# Patient Record
Sex: FEMALE | Race: WHITE | NOT HISPANIC OR LATINO | Employment: FULL TIME | RURAL
[De-identification: names, ages, dates, MRNs, and addresses within clinical notes are randomized per-mention and may not be internally consistent; named-entity substitution may affect disease eponyms.]

---

## 2020-03-18 ENCOUNTER — HISTORICAL (OUTPATIENT)
Dept: ADMINISTRATIVE | Facility: HOSPITAL | Age: 60
End: 2020-03-18

## 2020-12-03 ENCOUNTER — HISTORICAL (OUTPATIENT)
Dept: ADMINISTRATIVE | Facility: HOSPITAL | Age: 60
End: 2020-12-03

## 2020-12-15 ENCOUNTER — HISTORICAL (OUTPATIENT)
Dept: ADMINISTRATIVE | Facility: HOSPITAL | Age: 60
End: 2020-12-15

## 2021-04-14 DIAGNOSIS — J44.9 COLD (CHRONIC OBSTRUCTIVE LUNG DISEASE): Primary | ICD-10-CM

## 2021-12-29 ENCOUNTER — OFFICE VISIT (OUTPATIENT)
Dept: FAMILY MEDICINE | Facility: CLINIC | Age: 61
End: 2021-12-29
Payer: COMMERCIAL

## 2021-12-29 VITALS — TEMPERATURE: 99 F

## 2021-12-29 DIAGNOSIS — Z20.822 CONTACT WITH AND (SUSPECTED) EXPOSURE TO COVID-19: Primary | ICD-10-CM

## 2021-12-29 DIAGNOSIS — R11.0 NAUSEA: ICD-10-CM

## 2021-12-29 LAB
CTP QC/QA: YES
FLUAV AG NPH QL: NEGATIVE
FLUBV AG NPH QL: NEGATIVE
SARS-COV-2 AG RESP QL IA.RAPID: NEGATIVE

## 2021-12-29 PROCEDURE — 87428 POCT SARS-COV2 (COVID) WITH FLU ANTIGEN: ICD-10-PCS | Mod: QW,,, | Performed by: NURSE PRACTITIONER

## 2021-12-29 PROCEDURE — 99051 MED SERV EVE/WKEND/HOLIDAY: CPT | Mod: ,,, | Performed by: NURSE PRACTITIONER

## 2021-12-29 PROCEDURE — 87428 SARSCOV & INF VIR A&B AG IA: CPT | Mod: QW,,, | Performed by: NURSE PRACTITIONER

## 2021-12-29 PROCEDURE — 1159F MED LIST DOCD IN RCRD: CPT | Mod: CPTII,,, | Performed by: NURSE PRACTITIONER

## 2021-12-29 PROCEDURE — 99051 PR MEDICAL SERVICES, EVE/WKEND/HOLIDAY: ICD-10-PCS | Mod: ,,, | Performed by: NURSE PRACTITIONER

## 2021-12-29 PROCEDURE — 1160F RVW MEDS BY RX/DR IN RCRD: CPT | Mod: CPTII,,, | Performed by: NURSE PRACTITIONER

## 2021-12-29 PROCEDURE — 99213 PR OFFICE/OUTPT VISIT, EST, LEVL III, 20-29 MIN: ICD-10-PCS | Mod: ,,, | Performed by: NURSE PRACTITIONER

## 2021-12-29 PROCEDURE — 1160F PR REVIEW ALL MEDS BY PRESCRIBER/CLIN PHARMACIST DOCUMENTED: ICD-10-PCS | Mod: CPTII,,, | Performed by: NURSE PRACTITIONER

## 2021-12-29 PROCEDURE — 1159F PR MEDICATION LIST DOCUMENTED IN MEDICAL RECORD: ICD-10-PCS | Mod: CPTII,,, | Performed by: NURSE PRACTITIONER

## 2021-12-29 PROCEDURE — 99213 OFFICE O/P EST LOW 20 MIN: CPT | Mod: ,,, | Performed by: NURSE PRACTITIONER

## 2021-12-29 RX ORDER — ONDANSETRON 4 MG/1
4 TABLET, FILM COATED ORAL EVERY 6 HOURS PRN
Qty: 30 TABLET | Refills: 0 | Status: SHIPPED | OUTPATIENT
Start: 2021-12-29 | End: 2022-03-29 | Stop reason: ALTCHOICE

## 2021-12-31 ENCOUNTER — OFFICE VISIT (OUTPATIENT)
Dept: FAMILY MEDICINE | Facility: CLINIC | Age: 61
End: 2021-12-31
Payer: COMMERCIAL

## 2021-12-31 VITALS
RESPIRATION RATE: 18 BRPM | HEIGHT: 65 IN | WEIGHT: 90 LBS | BODY MASS INDEX: 14.99 KG/M2 | HEART RATE: 82 BPM | OXYGEN SATURATION: 98 % | TEMPERATURE: 98 F

## 2021-12-31 DIAGNOSIS — J20.9 ACUTE BRONCHITIS, UNSPECIFIED ORGANISM: Primary | ICD-10-CM

## 2021-12-31 DIAGNOSIS — R09.81 NASAL CONGESTION: ICD-10-CM

## 2021-12-31 DIAGNOSIS — Z20.822 LAB TEST NEGATIVE FOR COVID-19 VIRUS: ICD-10-CM

## 2021-12-31 PROCEDURE — 1159F PR MEDICATION LIST DOCUMENTED IN MEDICAL RECORD: ICD-10-PCS | Mod: CPTII,,, | Performed by: NURSE PRACTITIONER

## 2021-12-31 PROCEDURE — 1160F RVW MEDS BY RX/DR IN RCRD: CPT | Mod: CPTII,,, | Performed by: NURSE PRACTITIONER

## 2021-12-31 PROCEDURE — 3008F BODY MASS INDEX DOCD: CPT | Mod: CPTII,,, | Performed by: NURSE PRACTITIONER

## 2021-12-31 PROCEDURE — 1159F MED LIST DOCD IN RCRD: CPT | Mod: CPTII,,, | Performed by: NURSE PRACTITIONER

## 2021-12-31 PROCEDURE — 99214 OFFICE O/P EST MOD 30 MIN: CPT | Mod: ,,, | Performed by: NURSE PRACTITIONER

## 2021-12-31 PROCEDURE — 87428 POCT SARS-COV2 (COVID) WITH FLU ANTIGEN: ICD-10-PCS | Mod: QW,,, | Performed by: NURSE PRACTITIONER

## 2021-12-31 PROCEDURE — 3008F PR BODY MASS INDEX (BMI) DOCUMENTED: ICD-10-PCS | Mod: CPTII,,, | Performed by: NURSE PRACTITIONER

## 2021-12-31 PROCEDURE — 1160F PR REVIEW ALL MEDS BY PRESCRIBER/CLIN PHARMACIST DOCUMENTED: ICD-10-PCS | Mod: CPTII,,, | Performed by: NURSE PRACTITIONER

## 2021-12-31 PROCEDURE — 87428 SARSCOV & INF VIR A&B AG IA: CPT | Mod: QW,,, | Performed by: NURSE PRACTITIONER

## 2021-12-31 PROCEDURE — 99214 PR OFFICE/OUTPT VISIT, EST, LEVL IV, 30-39 MIN: ICD-10-PCS | Mod: ,,, | Performed by: NURSE PRACTITIONER

## 2021-12-31 RX ORDER — ALBUTEROL SULFATE 90 UG/1
2 AEROSOL, METERED RESPIRATORY (INHALATION) EVERY 6 HOURS PRN
Qty: 18 G | Refills: 1 | Status: SHIPPED | OUTPATIENT
Start: 2021-12-31 | End: 2023-07-05 | Stop reason: SDUPTHER

## 2021-12-31 NOTE — PROGRESS NOTES
[facility]    Chief Complaint      Chief Complaint   Patient presents with    Fatigue     Exposed to covid/fatigue/runny nose/nausea/ Headaches/coughing     History of Present Illness      Neyda Claire is a 61 y.o. female with chronic conditions of who presents today for c/o URI symptoms x1 week.    URI   This is a recurrent problem. The current episode started in the past 7 days. The problem has been waxing and waning. There has been no fever. Associated symptoms include congestion, coughing, nausea, neck pain, a sore throat and wheezing. Pertinent negatives include no abdominal pain, chest pain, diarrhea, dysuria, ear pain, headaches, plugged ear sensation, rash, rhinorrhea, sinus pain, sneezing, swollen glands or vomiting. She has tried inhaler use for the symptoms. The treatment provided mild relief.     Past Medical History:  History reviewed. No pertinent past medical history.    Past Surgical History:   has no past surgical history on file.    Social History:  Social History     Tobacco Use    Smoking status: Current Every Day Smoker     Types: Vaping with nicotine    Smokeless tobacco: Never Used   Substance Use Topics    Drug use: Never     I personally reviewed all past medical, surgical, and social.     Review of Systems   Constitutional: Positive for malaise/fatigue. Negative for chills and fever.   HENT: Positive for congestion and sore throat. Negative for ear pain, hearing loss, rhinorrhea, sinus pain, sneezing and tinnitus.    Eyes: Negative for discharge and redness.   Respiratory: Positive for cough and wheezing. Negative for sputum production and shortness of breath.    Cardiovascular: Negative for chest pain, palpitations and leg swelling.   Gastrointestinal: Positive for nausea. Negative for abdominal pain, diarrhea and vomiting.   Genitourinary: Negative for dysuria, frequency and urgency.   Musculoskeletal: Positive for neck pain. Negative for myalgias.   Skin: Negative for  "itching and rash.   Neurological: Negative for dizziness, weakness and headaches.   Endo/Heme/Allergies: Does not bruise/bleed easily.   Psychiatric/Behavioral: Negative for suicidal ideas.        Medications:  Outpatient Encounter Medications as of 12/31/2021   Medication Sig Dispense Refill    albuterol (VENTOLIN HFA) 90 mcg/actuation inhaler Inhale 2 puffs into the lungs every 6 (six) hours as needed for Wheezing. Rescue 18 g 1    ondansetron (ZOFRAN) 4 MG tablet Take 1 tablet (4 mg total) by mouth every 6 (six) hours as needed for Nausea. 30 tablet 0     No facility-administered encounter medications on file as of 12/31/2021.     Allergies:  Review of patient's allergies indicates:   Allergen Reactions    Sulfa (sulfonamide antibiotics)      Health Maintenance:  Immunization History   Administered Date(s) Administered    COVID-19, MRNA, LN-S, PF (MODERNA FULL 0.5 ML DOSE) 03/12/2021, 04/09/2021      Health Maintenance   Topic Date Due    Hepatitis C Screening  Never done    Lipid Panel  Never done    TETANUS VACCINE  Never done    Mammogram  Never done        Physical Exam      Height and Weight  Height: 5' 5" (165.1 cm)  Weight: 40.8 kg (90 lb)  BSA (Calculated - sq m): 1.37 sq meters  BMI (Calculated): 15  Weight in (lb) to have BMI = 25: 149.9]    Physical Exam  Vitals and nursing note reviewed.   Constitutional:       General: She is not in acute distress.     Appearance: Normal appearance.   HENT:      Head: Normocephalic and atraumatic.      Right Ear: External ear normal.      Left Ear: External ear normal.      Nose: Nose normal.      Mouth/Throat:      Mouth: Mucous membranes are moist.      Pharynx: Oropharynx is clear.   Eyes:      Conjunctiva/sclera: Conjunctivae normal.      Pupils: Pupils are equal, round, and reactive to light.   Cardiovascular:      Rate and Rhythm: Normal rate and regular rhythm.      Pulses: Normal pulses.      Heart sounds: Normal heart sounds. No murmur " heard.      Pulmonary:      Effort: Pulmonary effort is normal. No respiratory distress.      Breath sounds: Examination of the left-upper field reveals wheezing. Examination of the left-middle field reveals wheezing. Wheezing present.   Musculoskeletal:         General: Normal range of motion.      Cervical back: Normal range of motion and neck supple.   Skin:     General: Skin is warm and dry.   Neurological:      General: No focal deficit present.      Mental Status: She is alert and oriented to person, place, and time. Mental status is at baseline.   Psychiatric:         Mood and Affect: Mood normal.         Behavior: Behavior normal.         Thought Content: Thought content normal.         Judgment: Judgment normal.        Assessment/Plan     Neyda Claire is a 61 y.o.female with:    1. Nasal congestion  - POCT SARS-COV2 (COVID) with Flu Antigen    2. Lab test negative for COVID-19 virus    3. Acute bronchitis, unspecified organism  - albuterol (VENTOLIN HFA) 90 mcg/actuation inhaler; Inhale 2 puffs into the lungs every 6 (six) hours as needed for Wheezing. Rescue  Dispense: 18 g; Refill: 1   Your test was NEGATIVE for COVID-19 (coronavirus).      You may leave home and/or return to work when the following conditions are met:  · 24 hours fever free without fever-reducing medications AND  · Improved symptoms  · You are fully vaccinated or have not had close contact with someone with COVID-19 (within 6 feet for 15 minutes or more)    If you are fully vaccinated and had a close contact, there is no need for quarantine, unless you develop symptoms.      If you are not fully vaccinated and had a close contact:  · Retest at 5 to 7 days post-exposure  · If possible, it is recommended that you quarantine for 5 days from the time of contact regardless of your test status.  · A mask should be work indoors post quarantine.    If your symptoms worsen or if you have any other concerns, please contact [facility] at  [phone].     Sincerely,    Chronic conditions status updated as per HPI.  Other than changes above, cont current medications and maintain follow up with specialists.  Return to clinic as needed.    SHEILA Cadet  [facility]

## 2021-12-31 NOTE — LETTER
[address] [facility] [address]  Phone: [phone]  Fax: [phone]          Return to Work/School    Patient: Neyda Claire  YOB: 1960   Date: 12/31/2021     To Whom It May Concern:     Neyda Claire was in contact with/seen in my office on 12/31/2021. COVID-19 is present in our communities across the [facility]. There is limited testing for COVID at this time, so not all patients can be tested. In this situation, your employee meets the following criteria:     Neyda Claire has met the criteria for COVID-19 testing and has a NEGATIVE result. The employee can return to work once they are asymptomatic for 24 hours without the use of fever reducing medications (Tylenol, Motrin, etc).     If you have any questions or concerns, or if I can be of further assistance, please do not hesitate to contact me.     Sincerely,    SHEILA Cadet

## 2022-02-28 ENCOUNTER — OFFICE VISIT (OUTPATIENT)
Dept: FAMILY MEDICINE | Facility: CLINIC | Age: 62
End: 2022-02-28
Payer: COMMERCIAL

## 2022-02-28 VITALS
BODY MASS INDEX: 14.99 KG/M2 | WEIGHT: 90 LBS | DIASTOLIC BLOOD PRESSURE: 89 MMHG | TEMPERATURE: 99 F | HEIGHT: 65 IN | HEART RATE: 99 BPM | SYSTOLIC BLOOD PRESSURE: 105 MMHG

## 2022-02-28 DIAGNOSIS — Z20.822 CONTACT WITH AND (SUSPECTED) EXPOSURE TO COVID-19: ICD-10-CM

## 2022-02-28 DIAGNOSIS — M54.2 MUSCLE PAIN, CERVICAL: Primary | ICD-10-CM

## 2022-02-28 DIAGNOSIS — R05.9 COUGH: ICD-10-CM

## 2022-02-28 PROCEDURE — 3008F BODY MASS INDEX DOCD: CPT | Mod: ,,, | Performed by: NURSE PRACTITIONER

## 2022-02-28 PROCEDURE — 87428 SARSCOV & INF VIR A&B AG IA: CPT | Mod: QW,,, | Performed by: NURSE PRACTITIONER

## 2022-02-28 PROCEDURE — 1160F RVW MEDS BY RX/DR IN RCRD: CPT | Mod: ,,, | Performed by: NURSE PRACTITIONER

## 2022-02-28 PROCEDURE — 3074F SYST BP LT 130 MM HG: CPT | Mod: ,,, | Performed by: NURSE PRACTITIONER

## 2022-02-28 PROCEDURE — 87428 POCT SARS-COV2 (COVID) WITH FLU ANTIGEN: ICD-10-PCS | Mod: QW,,, | Performed by: NURSE PRACTITIONER

## 2022-02-28 PROCEDURE — 3079F PR MOST RECENT DIASTOLIC BLOOD PRESSURE 80-89 MM HG: ICD-10-PCS | Mod: ,,, | Performed by: NURSE PRACTITIONER

## 2022-02-28 PROCEDURE — 1159F PR MEDICATION LIST DOCUMENTED IN MEDICAL RECORD: ICD-10-PCS | Mod: ,,, | Performed by: NURSE PRACTITIONER

## 2022-02-28 PROCEDURE — 99214 PR OFFICE/OUTPT VISIT, EST, LEVL IV, 30-39 MIN: ICD-10-PCS | Mod: ,,, | Performed by: NURSE PRACTITIONER

## 2022-02-28 PROCEDURE — 3074F PR MOST RECENT SYSTOLIC BLOOD PRESSURE < 130 MM HG: ICD-10-PCS | Mod: ,,, | Performed by: NURSE PRACTITIONER

## 2022-02-28 PROCEDURE — 3079F DIAST BP 80-89 MM HG: CPT | Mod: ,,, | Performed by: NURSE PRACTITIONER

## 2022-02-28 PROCEDURE — 1159F MED LIST DOCD IN RCRD: CPT | Mod: ,,, | Performed by: NURSE PRACTITIONER

## 2022-02-28 PROCEDURE — 3008F PR BODY MASS INDEX (BMI) DOCUMENTED: ICD-10-PCS | Mod: ,,, | Performed by: NURSE PRACTITIONER

## 2022-02-28 PROCEDURE — 99214 OFFICE O/P EST MOD 30 MIN: CPT | Mod: ,,, | Performed by: NURSE PRACTITIONER

## 2022-02-28 PROCEDURE — 1160F PR REVIEW ALL MEDS BY PRESCRIBER/CLIN PHARMACIST DOCUMENTED: ICD-10-PCS | Mod: ,,, | Performed by: NURSE PRACTITIONER

## 2022-02-28 RX ORDER — PREDNISONE 20 MG/1
20 TABLET ORAL DAILY
Qty: 5 TABLET | Refills: 0 | Status: SHIPPED | OUTPATIENT
Start: 2022-02-28 | End: 2022-03-29 | Stop reason: ALTCHOICE

## 2022-02-28 RX ORDER — NAPROXEN SODIUM 550 MG/1
550 TABLET ORAL 2 TIMES DAILY WITH MEALS
Qty: 30 TABLET | Refills: 0 | Status: SHIPPED | OUTPATIENT
Start: 2022-02-28 | End: 2022-03-29 | Stop reason: ALTCHOICE

## 2022-02-28 RX ORDER — TIZANIDINE 4 MG/1
4 TABLET ORAL EVERY 6 HOURS PRN
Qty: 30 TABLET | Refills: 0 | Status: SHIPPED | OUTPATIENT
Start: 2022-02-28 | End: 2022-03-10

## 2022-02-28 RX ORDER — BENZONATATE 100 MG/1
100 CAPSULE ORAL 3 TIMES DAILY PRN
Qty: 30 CAPSULE | Refills: 0 | Status: SHIPPED | OUTPATIENT
Start: 2022-02-28 | End: 2022-03-29 | Stop reason: ALTCHOICE

## 2022-02-28 NOTE — PROGRESS NOTES
[facility]    Chief Complaint      Chief Complaint   Patient presents with    Cough    Nasal Congestion    Fever    Chills    Generalized Body Aches    Headache    Nausea    Documentation Only     X3d ago; main thing the cough; vac; no known exp; pain in neck (right) that radiates to ear and shoulder with limited movement; needs excuse       History of Present Illness      Neyda Claire is a 61 y.o. female with chronic conditions of COPD who presents today for c/o URI type symptoms primarily a cough and in her neck/R shoulder area.    Past Medical History:  History reviewed. No pertinent past medical history.    Past Surgical History:   has no past surgical history on file.    Social History:  Social History     Tobacco Use    Smoking status: Current Every Day Smoker     Types: Vaping with nicotine    Smokeless tobacco: Never Used   Substance Use Topics    Drug use: Never       I personally reviewed all past medical, surgical, and social.     Review of Systems   Constitutional: Positive for chills and fever. Negative for fatigue.   HENT: Positive for congestion. Negative for rhinorrhea, sneezing and sore throat.    Respiratory: Positive for cough. Negative for shortness of breath.    Gastrointestinal: Positive for nausea. Negative for diarrhea and vomiting.   Musculoskeletal: Positive for myalgias and neck pain.   Skin: Negative for rash.   Neurological: Positive for headaches.        Medications:  Outpatient Encounter Medications as of 2/28/2022   Medication Sig Dispense Refill    albuterol (VENTOLIN HFA) 90 mcg/actuation inhaler Inhale 2 puffs into the lungs every 6 (six) hours as needed for Wheezing. Rescue 18 g 1    benzonatate (TESSALON) 100 MG capsule Take 1 capsule (100 mg total) by mouth 3 (three) times daily as needed for Cough. 30 capsule 0    naproxen sodium (ANAPROX) 550 MG tablet Take 1 tablet (550 mg total) by mouth 2 (two) times daily with meals. 30 tablet 0    ondansetron  "(ZOFRAN) 4 MG tablet Take 1 tablet (4 mg total) by mouth every 6 (six) hours as needed for Nausea. (Patient not taking: Reported on 2/28/2022) 30 tablet 0    predniSONE (DELTASONE) 20 MG tablet Take 1 tablet (20 mg total) by mouth once daily. 5 tablet 0    tiZANidine (ZANAFLEX) 4 MG tablet Take 1 tablet (4 mg total) by mouth every 6 (six) hours as needed (Muscle spasm). 30 tablet 0     No facility-administered encounter medications on file as of 2/28/2022.       Allergies:  Review of patient's allergies indicates:   Allergen Reactions    Sulfa (sulfonamide antibiotics)        Health Maintenance:  Immunization History   Administered Date(s) Administered    COVID-19, MRNA, LN-S, PF (MODERNA FULL 0.5 ML DOSE) 03/12/2021, 04/09/2021      Health Maintenance   Topic Date Due    Hepatitis C Screening  Never done    Lipid Panel  Never done    TETANUS VACCINE  Never done    Mammogram  Never done        Physical Exam      Vital Signs  Temp: 98.6 °F (37 °C)  Temp src: Oral  Pulse: 99  BP: 105/89  BP Location: Left arm  Patient Position: Sitting  Height and Weight  Height: 5' 5" (165.1 cm)  Weight: 40.8 kg (90 lb)  BSA (Calculated - sq m): 1.37 sq meters  BMI (Calculated): 15  Weight in (lb) to have BMI = 25: 149.9]    Physical Exam  Vitals reviewed.   Constitutional:       General: She is not in acute distress.     Appearance: Normal appearance. She is well-developed.      Comments: Underweight   HENT:      Head: Normocephalic.      Right Ear: Hearing normal.      Left Ear: Hearing normal.      Nose: Congestion present.   Eyes:      General: Lids are normal.      Extraocular Movements: Extraocular movements intact.      Conjunctiva/sclera: Conjunctivae normal.      Pupils: Pupils are equal, round, and reactive to light.   Cardiovascular:      Rate and Rhythm: Normal rate.   Pulmonary:      Effort: Pulmonary effort is normal. No respiratory distress.   Musculoskeletal:         General: Normal range of motion.      " Cervical back: Normal range of motion and neck supple. Spasms and tenderness present.        Back:    Skin:     General: Skin is warm and dry.   Neurological:      Mental Status: She is alert and oriented to person, place, and time.      Gait: Gait is intact.   Psychiatric:         Behavior: Behavior is cooperative.          Laboratory:  CBC:      CMP:        Invalid input(s): CREATININ  LIPIDS:      TSH:      A1C:        Assessment/Plan     Neyda Claire is a 61 y.o.female with:     1. Contact with and (suspected) exposure to covid-19  - POCT SARS-COV2 (COVID) with Flu Antigen    2. Muscle pain, cervical  - naproxen sodium (ANAPROX) 550 MG tablet; Take 1 tablet (550 mg total) by mouth 2 (two) times daily with meals.  Dispense: 30 tablet; Refill: 0  - tiZANidine (ZANAFLEX) 4 MG tablet; Take 1 tablet (4 mg total) by mouth every 6 (six) hours as needed (Muscle spasm).  Dispense: 30 tablet; Refill: 0    3. Cough  - benzonatate (TESSALON) 100 MG capsule; Take 1 capsule (100 mg total) by mouth 3 (three) times daily as needed for Cough.  Dispense: 30 capsule; Refill: 0  - predniSONE (DELTASONE) 20 MG tablet; Take 1 tablet (20 mg total) by mouth once daily.  Dispense: 5 tablet; Refill: 0       Total time spent face-to-face and non-face-to-face coordinating care for this encounter was: 20 min    Chronic conditions status updated as per HPI.  Other than changes above, cont current medications and maintain follow up with specialists.  Return to clinic as needed.  Patient advised to schedule Wellness exam.    Samara Solano, SHERIFP  [facility]

## 2022-02-28 NOTE — LETTER
February 28, 2022    Neyda Claire  [address] [facility] [place] [address]             [facility]  [address] [facility] [address]  Phone: [phone]  Fax: [phone]   February 28, 2022     Patient: Neyda Claire   YOB: 1960   Date of Visit: 2/28/2022       To Whom it May Concern:    Neyda Claire was seen in my clinic on 2/28/2022. She may return to work on 03/03/2022.    Please excuse her from any classes or work missed.    If you have any questions or concerns, please don't hesitate to call.    Sincerely,         SHEILA Hodges

## 2022-03-29 ENCOUNTER — OFFICE VISIT (OUTPATIENT)
Dept: FAMILY MEDICINE | Facility: CLINIC | Age: 62
End: 2022-03-29
Payer: COMMERCIAL

## 2022-03-29 ENCOUNTER — PATIENT MESSAGE (OUTPATIENT)
Dept: FAMILY MEDICINE | Facility: CLINIC | Age: 62
End: 2022-03-29
Payer: COMMERCIAL

## 2022-03-29 ENCOUNTER — TELEPHONE (OUTPATIENT)
Dept: FAMILY MEDICINE | Facility: CLINIC | Age: 62
End: 2022-03-29
Payer: COMMERCIAL

## 2022-03-29 VITALS
DIASTOLIC BLOOD PRESSURE: 82 MMHG | TEMPERATURE: 98 F | OXYGEN SATURATION: 97 % | WEIGHT: 89 LBS | BODY MASS INDEX: 14.83 KG/M2 | SYSTOLIC BLOOD PRESSURE: 124 MMHG | HEART RATE: 76 BPM | HEIGHT: 65 IN

## 2022-03-29 DIAGNOSIS — Z13.220 SCREENING FOR LIPOID DISORDERS: ICD-10-CM

## 2022-03-29 DIAGNOSIS — M54.2 NECK PAIN: ICD-10-CM

## 2022-03-29 DIAGNOSIS — L50.9 URTICARIA: ICD-10-CM

## 2022-03-29 DIAGNOSIS — Z00.00 WELL ADULT EXAM: Primary | ICD-10-CM

## 2022-03-29 DIAGNOSIS — Z13.1 SCREENING FOR DIABETES MELLITUS: ICD-10-CM

## 2022-03-29 LAB
CHOLEST SERPL-MCNC: 174 MG/DL (ref 0–200)
CHOLEST/HDLC SERPL: 2.5 {RATIO}
GLUCOSE SERPL-MCNC: 103 MG/DL (ref 74–106)
HDLC SERPL-MCNC: 71 MG/DL (ref 40–60)
LDLC SERPL CALC-MCNC: 91 MG/DL
LDLC/HDLC SERPL: 1.3 {RATIO}
NONHDLC SERPL-MCNC: 103 MG/DL
TRIGL SERPL-MCNC: 60 MG/DL (ref 35–150)
VLDLC SERPL-MCNC: 12 MG/DL

## 2022-03-29 PROCEDURE — 99396 PR PREVENTIVE VISIT,EST,40-64: ICD-10-PCS | Mod: 25,,, | Performed by: NURSE PRACTITIONER

## 2022-03-29 PROCEDURE — 82947 ASSAY GLUCOSE BLOOD QUANT: CPT | Mod: ,,, | Performed by: CLINICAL MEDICAL LABORATORY

## 2022-03-29 PROCEDURE — 80061 LIPID PANEL: CPT | Mod: ,,, | Performed by: CLINICAL MEDICAL LABORATORY

## 2022-03-29 PROCEDURE — 99396 PREV VISIT EST AGE 40-64: CPT | Mod: 25,,, | Performed by: NURSE PRACTITIONER

## 2022-03-29 PROCEDURE — 3074F PR MOST RECENT SYSTOLIC BLOOD PRESSURE < 130 MM HG: ICD-10-PCS | Mod: ,,, | Performed by: NURSE PRACTITIONER

## 2022-03-29 PROCEDURE — 3079F DIAST BP 80-89 MM HG: CPT | Mod: ,,, | Performed by: NURSE PRACTITIONER

## 2022-03-29 PROCEDURE — 3008F PR BODY MASS INDEX (BMI) DOCUMENTED: ICD-10-PCS | Mod: ,,, | Performed by: NURSE PRACTITIONER

## 2022-03-29 PROCEDURE — 1159F PR MEDICATION LIST DOCUMENTED IN MEDICAL RECORD: ICD-10-PCS | Mod: ,,, | Performed by: NURSE PRACTITIONER

## 2022-03-29 PROCEDURE — 1159F MED LIST DOCD IN RCRD: CPT | Mod: ,,, | Performed by: NURSE PRACTITIONER

## 2022-03-29 PROCEDURE — 82947 GLUCOSE, FASTING: ICD-10-PCS | Mod: ,,, | Performed by: CLINICAL MEDICAL LABORATORY

## 2022-03-29 PROCEDURE — 3008F BODY MASS INDEX DOCD: CPT | Mod: ,,, | Performed by: NURSE PRACTITIONER

## 2022-03-29 PROCEDURE — 1160F RVW MEDS BY RX/DR IN RCRD: CPT | Mod: ,,, | Performed by: NURSE PRACTITIONER

## 2022-03-29 PROCEDURE — 96372 PR INJECTION,THERAP/PROPH/DIAG2ST, IM OR SUBCUT: ICD-10-PCS | Mod: ,,, | Performed by: NURSE PRACTITIONER

## 2022-03-29 PROCEDURE — 80061 LIPID PANEL: ICD-10-PCS | Mod: ,,, | Performed by: CLINICAL MEDICAL LABORATORY

## 2022-03-29 PROCEDURE — 96372 THER/PROPH/DIAG INJ SC/IM: CPT | Mod: ,,, | Performed by: NURSE PRACTITIONER

## 2022-03-29 PROCEDURE — 1160F PR REVIEW ALL MEDS BY PRESCRIBER/CLIN PHARMACIST DOCUMENTED: ICD-10-PCS | Mod: ,,, | Performed by: NURSE PRACTITIONER

## 2022-03-29 PROCEDURE — 3079F PR MOST RECENT DIASTOLIC BLOOD PRESSURE 80-89 MM HG: ICD-10-PCS | Mod: ,,, | Performed by: NURSE PRACTITIONER

## 2022-03-29 PROCEDURE — 3074F SYST BP LT 130 MM HG: CPT | Mod: ,,, | Performed by: NURSE PRACTITIONER

## 2022-03-29 RX ORDER — DEXAMETHASONE SODIUM PHOSPHATE 4 MG/ML
4 INJECTION, SOLUTION INTRA-ARTICULAR; INTRALESIONAL; INTRAMUSCULAR; INTRAVENOUS; SOFT TISSUE
Status: COMPLETED | OUTPATIENT
Start: 2022-03-29 | End: 2022-03-29

## 2022-03-29 RX ADMIN — DEXAMETHASONE SODIUM PHOSPHATE 4 MG: 4 INJECTION, SOLUTION INTRA-ARTICULAR; INTRALESIONAL; INTRAMUSCULAR; INTRAVENOUS; SOFT TISSUE at 09:03

## 2022-03-29 NOTE — TELEPHONE ENCOUNTER
----- Message from SHEILA Hodges sent at 3/29/2022  1:13 PM CDT -----  Patient has a lot degenerative changes.  Recommend starting Meloxicam and also getting a MRI.  Other options would be to send her to PT and possible Ortho Spine.

## 2022-04-01 ENCOUNTER — TELEPHONE (OUTPATIENT)
Dept: FAMILY MEDICINE | Facility: CLINIC | Age: 62
End: 2022-04-01
Payer: COMMERCIAL

## 2022-04-02 ENCOUNTER — TELEPHONE (OUTPATIENT)
Dept: FAMILY MEDICINE | Facility: CLINIC | Age: 62
End: 2022-04-02
Payer: COMMERCIAL

## 2022-04-03 ENCOUNTER — TELEPHONE (OUTPATIENT)
Dept: FAMILY MEDICINE | Facility: CLINIC | Age: 62
End: 2022-04-03
Payer: COMMERCIAL

## 2022-04-03 DIAGNOSIS — M50.30 DEGENERATIVE DISC DISEASE, CERVICAL: Primary | ICD-10-CM

## 2022-04-03 DIAGNOSIS — M54.2 NECK PAIN: ICD-10-CM

## 2022-04-03 RX ORDER — TIZANIDINE 4 MG/1
4 TABLET ORAL EVERY 8 HOURS
Qty: 30 TABLET | Refills: 3 | Status: SHIPPED | OUTPATIENT
Start: 2022-04-03 | End: 2022-06-05

## 2022-04-03 RX ORDER — MELOXICAM 7.5 MG/1
7.5 TABLET ORAL DAILY
Qty: 30 TABLET | Refills: 1 | Status: SHIPPED | OUTPATIENT
Start: 2022-04-03 | End: 2022-06-05

## 2022-04-03 NOTE — TELEPHONE ENCOUNTER
----- Message from SHEILA Hodges sent at 4/3/2022  8:39 AM CDT -----  I sent in Meloxicam and Zanaflex for her pain.  MRI has been ordered.  Please let her know when everything is approved and it is scheduled she will get a phone call.   ----- Message -----  From: Malia Blevins LPN  Sent: 4/2/2022  11:51 AM CDT  To: SHEILA Hodges    Pt would like the meloxicam and the mri; stated if possible on a wednesday  ----- Message -----  From: SHEILA Hodges  Sent: 3/29/2022   1:13 PM CDT  To: Malia Blevins LPN    Patient has a lot degenerative changes.  Recommend starting Meloxicam and also getting a MRI.  Other options would be to send her to PT and possible Ortho Spine.

## 2022-12-16 ENCOUNTER — OFFICE VISIT (OUTPATIENT)
Dept: FAMILY MEDICINE | Facility: CLINIC | Age: 62
End: 2022-12-16

## 2022-12-16 VITALS
TEMPERATURE: 99 F | WEIGHT: 85 LBS | DIASTOLIC BLOOD PRESSURE: 82 MMHG | SYSTOLIC BLOOD PRESSURE: 136 MMHG | OXYGEN SATURATION: 99 % | BODY MASS INDEX: 14.16 KG/M2 | HEIGHT: 65 IN | HEART RATE: 82 BPM

## 2022-12-16 DIAGNOSIS — R50.9 FEVER, UNSPECIFIED FEVER CAUSE: ICD-10-CM

## 2022-12-16 DIAGNOSIS — R07.81 RIB PAIN: ICD-10-CM

## 2022-12-16 DIAGNOSIS — R05.9 COUGH, UNSPECIFIED TYPE: ICD-10-CM

## 2022-12-16 DIAGNOSIS — J44.1 COPD EXACERBATION: Primary | ICD-10-CM

## 2022-12-16 PROCEDURE — 87428 SARSCOV & INF VIR A&B AG IA: CPT | Mod: QW,,, | Performed by: NURSE PRACTITIONER

## 2022-12-16 PROCEDURE — 99213 PR OFFICE/OUTPT VISIT, EST, LEVL III, 20-29 MIN: ICD-10-PCS | Mod: 25,,, | Performed by: NURSE PRACTITIONER

## 2022-12-16 PROCEDURE — 99213 OFFICE O/P EST LOW 20 MIN: CPT | Mod: 25,,, | Performed by: NURSE PRACTITIONER

## 2022-12-16 PROCEDURE — 87428 POCT SARS-COV2 (COVID) WITH FLU ANTIGEN: ICD-10-PCS | Mod: QW,,, | Performed by: NURSE PRACTITIONER

## 2022-12-16 PROCEDURE — 1159F MED LIST DOCD IN RCRD: CPT | Mod: ,,, | Performed by: NURSE PRACTITIONER

## 2022-12-16 PROCEDURE — 96372 PR INJECTION,THERAP/PROPH/DIAG2ST, IM OR SUBCUT: ICD-10-PCS | Mod: ,,, | Performed by: NURSE PRACTITIONER

## 2022-12-16 PROCEDURE — 3008F BODY MASS INDEX DOCD: CPT | Mod: ,,, | Performed by: NURSE PRACTITIONER

## 2022-12-16 PROCEDURE — 1159F PR MEDICATION LIST DOCUMENTED IN MEDICAL RECORD: ICD-10-PCS | Mod: ,,, | Performed by: NURSE PRACTITIONER

## 2022-12-16 PROCEDURE — 96372 THER/PROPH/DIAG INJ SC/IM: CPT | Mod: ,,, | Performed by: NURSE PRACTITIONER

## 2022-12-16 PROCEDURE — 3008F PR BODY MASS INDEX (BMI) DOCUMENTED: ICD-10-PCS | Mod: ,,, | Performed by: NURSE PRACTITIONER

## 2022-12-16 RX ORDER — AZITHROMYCIN 250 MG/1
TABLET, FILM COATED ORAL
Qty: 6 TABLET | Refills: 0 | Status: SHIPPED | OUTPATIENT
Start: 2022-12-16 | End: 2023-02-22

## 2022-12-16 RX ORDER — BENZONATATE 200 MG/1
200 CAPSULE ORAL 3 TIMES DAILY PRN
Qty: 30 CAPSULE | Refills: 0 | Status: SHIPPED | OUTPATIENT
Start: 2022-12-16 | End: 2023-01-06 | Stop reason: SDUPTHER

## 2022-12-16 RX ORDER — TRIAMCINOLONE ACETONIDE 40 MG/ML
40 INJECTION, SUSPENSION INTRA-ARTICULAR; INTRAMUSCULAR ONCE
Status: COMPLETED | OUTPATIENT
Start: 2022-12-16 | End: 2022-12-16

## 2022-12-16 RX ORDER — PROMETHAZINE HYDROCHLORIDE AND DEXTROMETHORPHAN HYDROBROMIDE 6.25; 15 MG/5ML; MG/5ML
5 SYRUP ORAL NIGHTLY PRN
Qty: 240 ML | Refills: 0 | Status: SHIPPED | OUTPATIENT
Start: 2022-12-16 | End: 2023-02-22

## 2022-12-16 RX ORDER — METHYLPREDNISOLONE 4 MG/1
TABLET ORAL
Qty: 21 EACH | Refills: 0 | Status: SHIPPED | OUTPATIENT
Start: 2022-12-16 | End: 2023-01-06

## 2022-12-16 RX ORDER — IPRATROPIUM BROMIDE AND ALBUTEROL SULFATE 2.5; .5 MG/3ML; MG/3ML
3 SOLUTION RESPIRATORY (INHALATION) EVERY 4 HOURS PRN
Qty: 300 ML | Refills: 0 | Status: SHIPPED | OUTPATIENT
Start: 2022-12-16

## 2022-12-16 RX ORDER — AZELASTINE 1 MG/ML
2 SPRAY, METERED NASAL 2 TIMES DAILY
Qty: 30 ML | Refills: 0 | Status: SHIPPED | OUTPATIENT
Start: 2022-12-16 | End: 2023-02-22

## 2022-12-16 RX ORDER — KETOROLAC TROMETHAMINE 30 MG/ML
60 INJECTION, SOLUTION INTRAMUSCULAR; INTRAVENOUS
Status: COMPLETED | OUTPATIENT
Start: 2022-12-16 | End: 2022-12-16

## 2022-12-16 RX ADMIN — TRIAMCINOLONE ACETONIDE 40 MG: 40 INJECTION, SUSPENSION INTRA-ARTICULAR; INTRAMUSCULAR at 02:12

## 2022-12-16 RX ADMIN — KETOROLAC TROMETHAMINE 60 MG: 30 INJECTION, SOLUTION INTRAMUSCULAR; INTRAVENOUS at 02:12

## 2022-12-16 NOTE — LETTER
December 16, 2022      [facility]  [address] [facility] [address]  Phone: [phone]  Fax: [phone]       Patient: Neyda Claire   YOB: 1960  Date of Visit: 12/16/2022    To Whom It May Concern:    Lemuel Claire  was at [facility] on 12/16/2022. Please excuse Neyda Claire from work from 12/15 & 12/16. The patient may return to work/school on 1219/2022 with no restrictions. If you have any questions or concerns, or if I can be of further assistance, please do not hesitate to contact me.    Sincerely,    REX Chiang

## 2022-12-16 NOTE — PROGRESS NOTES
Subjective:       Patient ID: Neyda Claire is a 62 y.o. female.    Chief Complaint: Flu Concerns (Productive cough, dizzy, little to no appetite, fever( 101-103 yesterday) x 2 days. Otc mucinex taken with no help)    Pt presents with cough, fever, and rib pains for over one week.    Review of Systems   Constitutional:  Positive for fatigue and fever.   HENT:  Positive for rhinorrhea and sinus pressure/congestion.    Eyes: Negative.    Respiratory:  Positive for cough and chest tightness.    Cardiovascular: Negative.    Gastrointestinal: Negative.    Endocrine: Negative.    Genitourinary: Negative.    Musculoskeletal:  Positive for myalgias.   Integumentary:  Negative.   Allergic/Immunologic: Negative.    Neurological: Negative.    Hematological: Negative.    Psychiatric/Behavioral: Negative.         Objective:      Physical Exam  Vitals and nursing note reviewed.   Constitutional:       General: She is not in acute distress.     Appearance: Normal appearance. She is not ill-appearing.   HENT:      Head: Normocephalic.      Right Ear: External ear normal.      Left Ear: External ear normal.      Nose: Congestion and rhinorrhea present.      Mouth/Throat:      Mouth: Mucous membranes are moist.      Pharynx: Posterior oropharyngeal erythema present.   Eyes:      Pupils: Pupils are equal, round, and reactive to light.   Cardiovascular:      Rate and Rhythm: Normal rate and regular rhythm.      Pulses: Normal pulses.      Heart sounds: Normal heart sounds. No murmur heard.    No friction rub. No gallop.   Pulmonary:      Effort: Pulmonary effort is normal. No respiratory distress.      Breath sounds: Normal breath sounds. No stridor. No wheezing, rhonchi or rales.   Abdominal:      General: There is no distension.      Palpations: Abdomen is soft.   Musculoskeletal:         General: Normal range of motion.      Cervical back: Normal range of motion and neck supple. No rigidity or tenderness.   Skin:     General: Skin  is warm and dry.      Coloration: Skin is not jaundiced or pale.   Neurological:      General: No focal deficit present.      Mental Status: She is alert and oriented to person, place, and time. Mental status is at baseline.      Cranial Nerves: No cranial nerve deficit.      Sensory: No sensory deficit.      Motor: No weakness.      Coordination: Coordination normal.      Gait: Gait normal.   Psychiatric:         Mood and Affect: Mood normal.         Behavior: Behavior normal.         Thought Content: Thought content normal.         Judgment: Judgment normal.       Assessment:       Problem List Items Addressed This Visit    None  Visit Diagnoses       COPD exacerbation    -  Primary    Relevant Medications    azithromycin (Z-SILVIO) 250 MG tablet    methylPREDNISolone (MEDROL DOSEPACK) 4 mg tablet    albuterol-ipratropium (DUO-NEB) 2.5 mg-0.5 mg/3 mL nebulizer solution    azelastine (ASTELIN) 137 mcg (0.1 %) nasal spray    triamcinolone acetonide injection 40 mg (Start on 12/16/2022  3:30 PM)    Fever, unspecified fever cause        Relevant Orders    POCT SARS-COV2 (COVID) with Flu Antigen (Completed)    Cough, unspecified type        Relevant Medications    promethazine-dextromethorphan (PROMETHAZINE-DM) 6.25-15 mg/5 mL Syrp    benzonatate (TESSALON) 200 MG capsule    Other Relevant Orders    X-Ray Chest PA And Lateral    Rib pain        Relevant Medications    triamcinolone acetonide injection 40 mg (Start on 12/16/2022  3:30 PM)    ketorolac injection 60 mg (Start on 12/16/2022  2:30 PM)              Plan:       Treat for COPD exacerbation

## 2023-01-06 ENCOUNTER — OFFICE VISIT (OUTPATIENT)
Dept: FAMILY MEDICINE | Facility: CLINIC | Age: 63
End: 2023-01-06
Payer: COMMERCIAL

## 2023-01-06 VITALS
HEART RATE: 79 BPM | DIASTOLIC BLOOD PRESSURE: 86 MMHG | HEIGHT: 65 IN | SYSTOLIC BLOOD PRESSURE: 120 MMHG | TEMPERATURE: 98 F | OXYGEN SATURATION: 97 % | BODY MASS INDEX: 13.66 KG/M2 | WEIGHT: 82 LBS

## 2023-01-06 DIAGNOSIS — Z20.822 CONTACT WITH AND (SUSPECTED) EXPOSURE TO COVID-19: ICD-10-CM

## 2023-01-06 DIAGNOSIS — R05.9 COUGH, UNSPECIFIED TYPE: ICD-10-CM

## 2023-01-06 DIAGNOSIS — J10.1 INFLUENZA B: Primary | ICD-10-CM

## 2023-01-06 LAB
CTP QC/QA: YES
FLUAV AG NPH QL: NEGATIVE
FLUBV AG NPH QL: POSITIVE
SARS-COV-2 AG RESP QL IA.RAPID: NEGATIVE

## 2023-01-06 PROCEDURE — 1159F PR MEDICATION LIST DOCUMENTED IN MEDICAL RECORD: ICD-10-PCS | Mod: ,,, | Performed by: NURSE PRACTITIONER

## 2023-01-06 PROCEDURE — 87428 POCT SARS-COV2 (COVID) WITH FLU ANTIGEN: ICD-10-PCS | Mod: QW,,, | Performed by: NURSE PRACTITIONER

## 2023-01-06 PROCEDURE — 99213 PR OFFICE/OUTPT VISIT, EST, LEVL III, 20-29 MIN: ICD-10-PCS | Mod: ,,, | Performed by: NURSE PRACTITIONER

## 2023-01-06 PROCEDURE — 3008F BODY MASS INDEX DOCD: CPT | Mod: ,,, | Performed by: NURSE PRACTITIONER

## 2023-01-06 PROCEDURE — 3008F PR BODY MASS INDEX (BMI) DOCUMENTED: ICD-10-PCS | Mod: ,,, | Performed by: NURSE PRACTITIONER

## 2023-01-06 PROCEDURE — 1160F PR REVIEW ALL MEDS BY PRESCRIBER/CLIN PHARMACIST DOCUMENTED: ICD-10-PCS | Mod: ,,, | Performed by: NURSE PRACTITIONER

## 2023-01-06 PROCEDURE — 87428 SARSCOV & INF VIR A&B AG IA: CPT | Mod: QW,,, | Performed by: NURSE PRACTITIONER

## 2023-01-06 PROCEDURE — 1159F MED LIST DOCD IN RCRD: CPT | Mod: ,,, | Performed by: NURSE PRACTITIONER

## 2023-01-06 PROCEDURE — 1160F RVW MEDS BY RX/DR IN RCRD: CPT | Mod: ,,, | Performed by: NURSE PRACTITIONER

## 2023-01-06 PROCEDURE — 3074F SYST BP LT 130 MM HG: CPT | Mod: ,,, | Performed by: NURSE PRACTITIONER

## 2023-01-06 PROCEDURE — 3079F PR MOST RECENT DIASTOLIC BLOOD PRESSURE 80-89 MM HG: ICD-10-PCS | Mod: ,,, | Performed by: NURSE PRACTITIONER

## 2023-01-06 PROCEDURE — 3074F PR MOST RECENT SYSTOLIC BLOOD PRESSURE < 130 MM HG: ICD-10-PCS | Mod: ,,, | Performed by: NURSE PRACTITIONER

## 2023-01-06 PROCEDURE — 3079F DIAST BP 80-89 MM HG: CPT | Mod: ,,, | Performed by: NURSE PRACTITIONER

## 2023-01-06 PROCEDURE — 99213 OFFICE O/P EST LOW 20 MIN: CPT | Mod: ,,, | Performed by: NURSE PRACTITIONER

## 2023-01-06 RX ORDER — BENZONATATE 200 MG/1
200 CAPSULE ORAL 3 TIMES DAILY PRN
Qty: 30 CAPSULE | Refills: 0 | Status: SHIPPED | OUTPATIENT
Start: 2023-01-06 | End: 2023-01-07

## 2023-01-06 NOTE — LETTER
January 6, 2023    Neyda Claire  [address] [facility] [place] [address]             [facility]  Family Medicine  [address] [place], [place] [address] [facility] [place] [address]  Phone: [phone]  Fax: [phone]   January 6, 2023     Patient: Neyda Claire   YOB: 1960   Date of Visit: 1/6/2023       To Whom it May Concern:    Neyda Claire was seen in my clinic on 1/6/2023. She may return to work on 1/9/23 .    Please excuse her from any classes or work missed.    If you have any questions or concerns, please don't hesitate to call.    Sincerely,         SHEILA Hodges

## 2023-01-06 NOTE — PROGRESS NOTES
[facility]    Chief Complaint      Chief Complaint   Patient presents with    Fever     States off and on     Fatigue    Generalized Body Aches    Chills    Cough    Nasal Congestion    Headache    Letter for School/Work    Documentation Only     States as soon as she finished the abx and steroids it all came back about a week ago       History of Present Illness      Neyda Claire is a 62 y.o. female. She  has no past medical history on file., who presents today for URI Type symptoms off and on for a couple of weeks.  Was previously treated and states symptoms returned after completing her medications about a week ago.  Patient also reports some other health issues she is concerned about- has not had a check up in a while- last colonoscopy over 10 years ago.    Past Medical History:  History reviewed. No pertinent past medical history.    Past Surgical History:   has no past surgical history on file.    Social History:  Social History     Tobacco Use    Smoking status: Every Day     Types: Vaping with nicotine    Smokeless tobacco: Never   Substance Use Topics    Drug use: Never       I personally reviewed all past medical, surgical, and social.     Review of Systems   Constitutional:  Positive for chills, fatigue and fever.   HENT:  Positive for congestion. Negative for rhinorrhea, sneezing and sore throat.    Respiratory:  Positive for cough. Negative for shortness of breath.    Gastrointestinal:  Negative for diarrhea, nausea and vomiting.   Musculoskeletal:  Positive for myalgias.   Skin:  Negative for rash.   Neurological:  Positive for headaches.      Medications:  Outpatient Encounter Medications as of 1/6/2023   Medication Sig Dispense Refill    albuterol-ipratropium (DUO-NEB) 2.5 mg-0.5 mg/3 mL nebulizer solution Take 3 mLs by nebulization every 4 (four) hours as needed for Wheezing. Rescue 300 mL 0    azelastine (ASTELIN) 137 mcg (0.1 %) nasal spray 2 sprays (274 mcg total) by Nasal route 2  "(two) times daily. 30 mL 0    azithromycin (Z-SILVIO) 250 MG tablet TAKE TWO TABS PO ON DAY 1, THEN TAKE ONE TAB A DAY FOR 4 DAYS 6 tablet 0    [] methylPREDNISolone (MEDROL DOSEPACK) 4 mg tablet use as directed 21 each 0    promethazine-dextromethorphan (PROMETHAZINE-DM) 6.25-15 mg/5 mL Syrp Take 5 mLs by mouth nightly as needed (cough). 240 mL 0    tiZANidine (ZANAFLEX) 4 MG tablet TAKE 1 TABLET BY MOUTH EVERY 8 HOURS 30 tablet 0    [DISCONTINUED] benzonatate (TESSALON) 200 MG capsule Take 1 capsule (200 mg total) by mouth 3 (three) times daily as needed for Cough. 30 capsule 0    [DISCONTINUED] meloxicam (MOBIC) 7.5 MG tablet Take 1 tablet by mouth once daily 30 tablet 0    albuterol (VENTOLIN HFA) 90 mcg/actuation inhaler Inhale 2 puffs into the lungs every 6 (six) hours as needed for Wheezing. Rescue 18 g 1    [DISCONTINUED] benzonatate (TESSALON) 200 MG capsule Take 1 capsule (200 mg total) by mouth 3 (three) times daily as needed for Cough. 30 capsule 0     No facility-administered encounter medications on file as of 2023.       Allergies:  Review of patient's allergies indicates:   Allergen Reactions    Sulfa (sulfonamide antibiotics)        Health Maintenance:  Immunization History   Administered Date(s) Administered    COVID-19, MRNA, LN-S, PF (MODERNA FULL 0.5 ML DOSE) 2021, 2021      Health Maintenance   Topic Date Due    Hepatitis C Screening  Never done    TETANUS VACCINE  Never done    Mammogram  Never done    Lipid Panel  2027        Physical Exam      Vital Signs  Temp: 98.1 °F (36.7 °C)  Temp src: Oral  Pulse: 79  SpO2: 97 %  BP: 120/86  Height and Weight  Height: 5' 5" (165.1 cm)  Weight: 37.2 kg (82 lb)  BSA (Calculated - sq m): 1.31 sq meters  BMI (Calculated): 13.6  Weight in (lb) to have BMI = 25: 149.9]    Physical Exam  Vitals and nursing note reviewed.   Constitutional:       General: She is not in acute distress.     Appearance: She is well-developed. She is " ill-appearing.   HENT:      Head: Normocephalic.      Right Ear: Hearing normal.      Left Ear: Hearing normal.      Nose: Congestion present.   Eyes:      General: Lids are normal.      Conjunctiva/sclera: Conjunctivae normal.   Cardiovascular:      Rate and Rhythm: Normal rate.   Pulmonary:      Effort: Pulmonary effort is normal. No respiratory distress.   Musculoskeletal:         General: Normal range of motion.      Cervical back: Normal range of motion and neck supple.   Skin:     General: Skin is warm and dry.   Neurological:      Mental Status: She is alert and oriented to person, place, and time.      Gait: Gait is intact.   Psychiatric:         Behavior: Behavior is cooperative.        Laboratory:  CBC:      CMP:        Invalid input(s): CREATININ  LIPIDS:  Recent Labs   Lab 03/29/22  0919   HDL Cholesterol 71 H   Cholesterol 174   Triglycerides 60   LDL Calculated 91   Cholesterol/HDL Ratio (Risk Factor) 2.5   Non-     TSH:      A1C:        Assessment/Plan     Neyda Claire is a 62 y.o.female with:     1. Contact with and (suspected) exposure to covid-19  - POCT SARS-COV2 (COVID) with Flu Antigen    2. Influenza B    3. Cough, unspecified type     Patient to make appt to come back for wellness and physical      Total time spent face-to-face and non-face-to-face coordinating care for this encounter was: 20 minutes     Chronic conditions status updated as per HPI.  Other than changes above, cont current medications and maintain follow up with specialists.  Return to clinic prn if symptoms worsen or fail to improve.    Samara Solano, SHERIFP  [facility]

## 2023-02-22 ENCOUNTER — OFFICE VISIT (OUTPATIENT)
Dept: OBSTETRICS AND GYNECOLOGY | Facility: CLINIC | Age: 63
End: 2023-02-22
Payer: COMMERCIAL

## 2023-02-22 VITALS
HEIGHT: 65 IN | HEART RATE: 73 BPM | RESPIRATION RATE: 16 BRPM | DIASTOLIC BLOOD PRESSURE: 94 MMHG | WEIGHT: 86 LBS | SYSTOLIC BLOOD PRESSURE: 127 MMHG | TEMPERATURE: 98 F | BODY MASS INDEX: 14.33 KG/M2

## 2023-02-22 DIAGNOSIS — R19.5 OCCULT BLOOD IN STOOLS: ICD-10-CM

## 2023-02-22 DIAGNOSIS — Z12.31 SCREENING MAMMOGRAM, ENCOUNTER FOR: ICD-10-CM

## 2023-02-22 DIAGNOSIS — E55.9 VITAMIN D DEFICIENCY: ICD-10-CM

## 2023-02-22 DIAGNOSIS — R23.2 HOT FLASHES: ICD-10-CM

## 2023-02-22 DIAGNOSIS — F31.9 BIPOLAR AFFECTIVE DISORDER, REMISSION STATUS UNSPECIFIED: ICD-10-CM

## 2023-02-22 DIAGNOSIS — K62.5 RECTAL BLEEDING: ICD-10-CM

## 2023-02-22 DIAGNOSIS — R53.1 ASTHENIA DUE TO DISEASE: ICD-10-CM

## 2023-02-22 DIAGNOSIS — R10.12 LEFT UPPER QUADRANT PAIN: ICD-10-CM

## 2023-02-22 DIAGNOSIS — M62.838 CERVICAL PARASPINAL MUSCLE SPASM: ICD-10-CM

## 2023-02-22 DIAGNOSIS — F32.A DEPRESSION, UNSPECIFIED DEPRESSION TYPE: ICD-10-CM

## 2023-02-22 DIAGNOSIS — R63.4 WEIGHT LOSS: ICD-10-CM

## 2023-02-22 DIAGNOSIS — R35.1 NOCTURIA MORE THAN TWICE PER NIGHT: ICD-10-CM

## 2023-02-22 DIAGNOSIS — R10.13 EPIGASTRIC PAIN: Primary | ICD-10-CM

## 2023-02-22 LAB
25(OH)D3 SERPL-MCNC: 14.4 NG/ML
ALBUMIN SERPL BCP-MCNC: 4.3 G/DL (ref 3.5–5)
ALBUMIN/GLOB SERPL: 1.6 {RATIO}
ALP SERPL-CCNC: 68 U/L (ref 50–130)
ALT SERPL W P-5'-P-CCNC: 30 U/L (ref 13–56)
ANION GAP SERPL CALCULATED.3IONS-SCNC: 6 MMOL/L (ref 7–16)
AST SERPL W P-5'-P-CCNC: 20 U/L (ref 15–37)
BASOPHILS # BLD AUTO: 0.08 K/UL (ref 0–0.2)
BASOPHILS NFR BLD AUTO: 0.6 % (ref 0–1)
BILIRUB SERPL-MCNC: 0.5 MG/DL (ref ?–1.2)
BILIRUB UR QL STRIP: NEGATIVE
BUN SERPL-MCNC: 10 MG/DL (ref 7–18)
BUN/CREAT SERPL: 21 (ref 6–20)
CALCIUM SERPL-MCNC: 9.1 MG/DL (ref 8.5–10.1)
CANCER AG125 SERPL-ACNC: 8 U/ML (ref 0–35)
CANCER AG19-9 SERPL-ACNC: 41.5 U/ML (ref 0–35)
CEA SERPL-MCNC: 6.7 NG/ML (ref 0–3)
CHLORIDE SERPL-SCNC: 108 MMOL/L (ref 98–107)
CHOLEST SERPL-MCNC: 189 MG/DL (ref 0–200)
CHOLEST/HDLC SERPL: 2.3 {RATIO}
CLARITY UR: CLEAR
CO2 SERPL-SCNC: 29 MMOL/L (ref 21–32)
COLOR UR: NORMAL
CREAT SERPL-MCNC: 0.48 MG/DL (ref 0.55–1.02)
CRP SERPL-MCNC: <0.29 MG/DL (ref 0–0.8)
DHEA-S SERPL-MCNC: 15.2 ΜG/DL (ref 3–157)
DIFFERENTIAL METHOD BLD: ABNORMAL
EGFR (NO RACE VARIABLE) (RUSH/TITUS): 107 ML/MIN/1.73M²
EOSINOPHIL # BLD AUTO: 0.04 K/UL (ref 0–0.5)
EOSINOPHIL NFR BLD AUTO: 0.3 % (ref 1–4)
ERYTHROCYTE [DISTWIDTH] IN BLOOD BY AUTOMATED COUNT: 13.7 % (ref 11.5–14.5)
ERYTHROCYTE [SEDIMENTATION RATE] IN BLOOD BY WESTERGREN METHOD: 2 MM/HR (ref 0–30)
EST. AVERAGE GLUCOSE BLD GHB EST-MCNC: 100 MG/DL
GLOBULIN SER-MCNC: 2.7 G/DL (ref 2–4)
GLUCOSE SERPL-MCNC: 80 MG/DL (ref 74–106)
GLUCOSE UR STRIP-MCNC: NORMAL MG/DL
HBA1C MFR BLD HPLC: 5.6 % (ref 4.5–6.6)
HCT VFR BLD AUTO: 43.2 % (ref 38–47)
HDLC SERPL-MCNC: 81 MG/DL (ref 40–60)
HGB BLD-MCNC: 13.9 G/DL (ref 12–16)
IMM GRANULOCYTES # BLD AUTO: 0.03 K/UL (ref 0–0.04)
IMM GRANULOCYTES NFR BLD: 0.2 % (ref 0–0.4)
KETONES UR STRIP-SCNC: NEGATIVE MG/DL
LDLC SERPL CALC-MCNC: 95 MG/DL
LDLC/HDLC SERPL: 1.2 {RATIO}
LEUKOCYTE ESTERASE UR QL STRIP: NEGATIVE
LYMPHOCYTES # BLD AUTO: 3.47 K/UL (ref 1–4.8)
LYMPHOCYTES NFR BLD AUTO: 27.5 % (ref 27–41)
MCH RBC QN AUTO: 31.1 PG (ref 27–31)
MCHC RBC AUTO-ENTMCNC: 32.2 G/DL (ref 32–36)
MCV RBC AUTO: 96.6 FL (ref 80–96)
MONOCYTES # BLD AUTO: 0.76 K/UL (ref 0–0.8)
MONOCYTES NFR BLD AUTO: 6 % (ref 2–6)
MPC BLD CALC-MCNC: 9.2 FL (ref 9.4–12.4)
NEUTROPHILS # BLD AUTO: 8.23 K/UL (ref 1.8–7.7)
NEUTROPHILS NFR BLD AUTO: 65.4 % (ref 53–65)
NITRITE UR QL STRIP: NEGATIVE
NONHDLC SERPL-MCNC: 108 MG/DL
NRBC # BLD AUTO: 0 X10E3/UL
NRBC, AUTO (.00): 0 %
PH UR STRIP: 5.5 PH UNITS
PLATELET # BLD AUTO: 364 K/UL (ref 150–400)
POTASSIUM SERPL-SCNC: 3.9 MMOL/L (ref 3.5–5.1)
PROT SERPL-MCNC: 7 G/DL (ref 6.4–8.2)
PROT UR QL STRIP: NEGATIVE
RBC # BLD AUTO: 4.47 M/UL (ref 4.2–5.4)
RBC # UR STRIP: NEGATIVE /UL
SODIUM SERPL-SCNC: 139 MMOL/L (ref 136–145)
SP GR UR STRIP: 1.01
T4 FREE SERPL-MCNC: 0.95 NG/DL (ref 0.76–1.46)
TRIGL SERPL-MCNC: 63 MG/DL (ref 35–150)
TSH SERPL DL<=0.005 MIU/L-ACNC: 0.55 UIU/ML (ref 0.36–3.74)
UROBILINOGEN UR STRIP-ACNC: NORMAL MG/DL
VLDLC SERPL-MCNC: 13 MG/DL
WBC # BLD AUTO: 12.61 K/UL (ref 4.5–11)

## 2023-02-22 PROCEDURE — 80050 PR  GENERAL HEALTH PANEL: ICD-10-PCS | Mod: ,,, | Performed by: CLINICAL MEDICAL LABORATORY

## 2023-02-22 PROCEDURE — 82378 CEA: ICD-10-PCS | Mod: ,,, | Performed by: CLINICAL MEDICAL LABORATORY

## 2023-02-22 PROCEDURE — 87624 HUMAN PAPILLOMAVIRUS (HPV): ICD-10-PCS | Mod: ,,, | Performed by: CLINICAL MEDICAL LABORATORY

## 2023-02-22 PROCEDURE — 80061 LIPID PANEL: ICD-10-PCS | Mod: ,,, | Performed by: CLINICAL MEDICAL LABORATORY

## 2023-02-22 PROCEDURE — 80050 GENERAL HEALTH PANEL: CPT | Mod: ,,, | Performed by: CLINICAL MEDICAL LABORATORY

## 2023-02-22 PROCEDURE — 81003 URINALYSIS: ICD-10-PCS | Mod: QW,,, | Performed by: CLINICAL MEDICAL LABORATORY

## 2023-02-22 PROCEDURE — 88142 CYTOPATH C/V THIN LAYER: CPT | Mod: TC,GCY | Performed by: OBSTETRICS & GYNECOLOGY

## 2023-02-22 PROCEDURE — 81003 URINALYSIS AUTO W/O SCOPE: CPT | Mod: QW,,, | Performed by: CLINICAL MEDICAL LABORATORY

## 2023-02-22 PROCEDURE — 82306 VITAMIN D: ICD-10-PCS | Mod: ,,, | Performed by: CLINICAL MEDICAL LABORATORY

## 2023-02-22 PROCEDURE — 84402 ASSAY OF FREE TESTOSTERONE: CPT | Mod: 90,,, | Performed by: CLINICAL MEDICAL LABORATORY

## 2023-02-22 PROCEDURE — 86304 IMMUNOASSAY TUMOR CA 125: CPT | Mod: ,,, | Performed by: CLINICAL MEDICAL LABORATORY

## 2023-02-22 PROCEDURE — 1160F PR REVIEW ALL MEDS BY PRESCRIBER/CLIN PHARMACIST DOCUMENTED: ICD-10-PCS | Mod: ,,, | Performed by: OBSTETRICS & GYNECOLOGY

## 2023-02-22 PROCEDURE — 84402 TESTOSTERONE, FREE (DIALYSIS) AND TOTAL, LC/MS/MS: ICD-10-PCS | Mod: 90,,, | Performed by: CLINICAL MEDICAL LABORATORY

## 2023-02-22 PROCEDURE — 82533 TOTAL CORTISOL: CPT | Mod: ,,, | Performed by: CLINICAL MEDICAL LABORATORY

## 2023-02-22 PROCEDURE — 86140 C-REACTIVE PROTEIN: ICD-10-PCS | Mod: ,,, | Performed by: CLINICAL MEDICAL LABORATORY

## 2023-02-22 PROCEDURE — 99205 OFFICE O/P NEW HI 60 MIN: CPT | Mod: ,,, | Performed by: OBSTETRICS & GYNECOLOGY

## 2023-02-22 PROCEDURE — 1159F PR MEDICATION LIST DOCUMENTED IN MEDICAL RECORD: ICD-10-PCS | Mod: ,,, | Performed by: OBSTETRICS & GYNECOLOGY

## 2023-02-22 PROCEDURE — 84403 ASSAY OF TOTAL TESTOSTERONE: CPT | Mod: 90,,, | Performed by: CLINICAL MEDICAL LABORATORY

## 2023-02-22 PROCEDURE — 83036 HEMOGLOBIN GLYCOSYLATED A1C: CPT | Mod: ,,, | Performed by: CLINICAL MEDICAL LABORATORY

## 2023-02-22 PROCEDURE — 87591 CHLAMYDIA/GC, PCR (THINPREP): ICD-10-PCS | Mod: ,,, | Performed by: CLINICAL MEDICAL LABORATORY

## 2023-02-22 PROCEDURE — 86301 IMMUNOASSAY TUMOR CA 19-9: CPT | Mod: ,,, | Performed by: CLINICAL MEDICAL LABORATORY

## 2023-02-22 PROCEDURE — 1159F MED LIST DOCD IN RCRD: CPT | Mod: ,,, | Performed by: OBSTETRICS & GYNECOLOGY

## 2023-02-22 PROCEDURE — 82270 OCCULT BLOOD FECES: CPT | Mod: ,,, | Performed by: OBSTETRICS & GYNECOLOGY

## 2023-02-22 PROCEDURE — 84403 TESTOSTERONE, FREE (DIALYSIS) AND TOTAL, LC/MS/MS: ICD-10-PCS | Mod: 90,,, | Performed by: CLINICAL MEDICAL LABORATORY

## 2023-02-22 PROCEDURE — 86304 CANCER ANTIGEN 125: ICD-10-PCS | Mod: ,,, | Performed by: CLINICAL MEDICAL LABORATORY

## 2023-02-22 PROCEDURE — 87624 HPV HI-RISK TYP POOLED RSLT: CPT | Mod: ,,, | Performed by: CLINICAL MEDICAL LABORATORY

## 2023-02-22 PROCEDURE — 82306 VITAMIN D 25 HYDROXY: CPT | Mod: ,,, | Performed by: CLINICAL MEDICAL LABORATORY

## 2023-02-22 PROCEDURE — 87491 CHLAMYDIA/GC, PCR (THINPREP): ICD-10-PCS | Mod: ,,, | Performed by: CLINICAL MEDICAL LABORATORY

## 2023-02-22 PROCEDURE — 3008F PR BODY MASS INDEX (BMI) DOCUMENTED: ICD-10-PCS | Mod: ,,, | Performed by: OBSTETRICS & GYNECOLOGY

## 2023-02-22 PROCEDURE — 85651 RBC SED RATE NONAUTOMATED: CPT | Mod: ,,, | Performed by: CLINICAL MEDICAL LABORATORY

## 2023-02-22 PROCEDURE — 86301 CANCER ANTIGEN 19-9: ICD-10-PCS | Mod: ,,, | Performed by: CLINICAL MEDICAL LABORATORY

## 2023-02-22 PROCEDURE — 36415 COLL VENOUS BLD VENIPUNCTURE: CPT | Mod: ,,, | Performed by: OBSTETRICS & GYNECOLOGY

## 2023-02-22 PROCEDURE — 99205 PR OFFICE/OUTPT VISIT, NEW, LEVL V, 60-74 MIN: ICD-10-PCS | Mod: ,,, | Performed by: OBSTETRICS & GYNECOLOGY

## 2023-02-22 PROCEDURE — 84439 ASSAY OF FREE THYROXINE: CPT | Mod: ,,, | Performed by: CLINICAL MEDICAL LABORATORY

## 2023-02-22 PROCEDURE — 83036 HEMOGLOBIN A1C: ICD-10-PCS | Mod: ,,, | Performed by: CLINICAL MEDICAL LABORATORY

## 2023-02-22 PROCEDURE — 82627 DEHYDROEPIANDROSTERONE: CPT | Mod: ,,, | Performed by: CLINICAL MEDICAL LABORATORY

## 2023-02-22 PROCEDURE — 87591 N.GONORRHOEAE DNA AMP PROB: CPT | Mod: ,,, | Performed by: CLINICAL MEDICAL LABORATORY

## 2023-02-22 PROCEDURE — 3074F PR MOST RECENT SYSTOLIC BLOOD PRESSURE < 130 MM HG: ICD-10-PCS | Mod: ,,, | Performed by: OBSTETRICS & GYNECOLOGY

## 2023-02-22 PROCEDURE — 36415 PR COLLECTION VENOUS BLOOD,VENIPUNCTURE: ICD-10-PCS | Mod: ,,, | Performed by: OBSTETRICS & GYNECOLOGY

## 2023-02-22 PROCEDURE — 80061 LIPID PANEL: CPT | Mod: ,,, | Performed by: CLINICAL MEDICAL LABORATORY

## 2023-02-22 PROCEDURE — 86140 C-REACTIVE PROTEIN: CPT | Mod: ,,, | Performed by: CLINICAL MEDICAL LABORATORY

## 2023-02-22 PROCEDURE — 82270 PR BLOOD OCCULT, BY PEROX, FECES, SINGLE, COLORECT SCRN: ICD-10-PCS | Mod: ,,, | Performed by: OBSTETRICS & GYNECOLOGY

## 2023-02-22 PROCEDURE — 82378 CARCINOEMBRYONIC ANTIGEN: CPT | Mod: ,,, | Performed by: CLINICAL MEDICAL LABORATORY

## 2023-02-22 PROCEDURE — 82533 CORTISOL, PM: ICD-10-PCS | Mod: ,,, | Performed by: CLINICAL MEDICAL LABORATORY

## 2023-02-22 PROCEDURE — 1160F RVW MEDS BY RX/DR IN RCRD: CPT | Mod: ,,, | Performed by: OBSTETRICS & GYNECOLOGY

## 2023-02-22 PROCEDURE — 3080F DIAST BP >= 90 MM HG: CPT | Mod: ,,, | Performed by: OBSTETRICS & GYNECOLOGY

## 2023-02-22 PROCEDURE — 84439 THYROID PANEL: ICD-10-PCS | Mod: ,,, | Performed by: CLINICAL MEDICAL LABORATORY

## 2023-02-22 PROCEDURE — 3008F BODY MASS INDEX DOCD: CPT | Mod: ,,, | Performed by: OBSTETRICS & GYNECOLOGY

## 2023-02-22 PROCEDURE — 85651 SEDIMENTATION RATE, AUTOMATED: ICD-10-PCS | Mod: ,,, | Performed by: CLINICAL MEDICAL LABORATORY

## 2023-02-22 PROCEDURE — 3080F PR MOST RECENT DIASTOLIC BLOOD PRESSURE >= 90 MM HG: ICD-10-PCS | Mod: ,,, | Performed by: OBSTETRICS & GYNECOLOGY

## 2023-02-22 PROCEDURE — 82627 DHEA-SULFATE: ICD-10-PCS | Mod: ,,, | Performed by: CLINICAL MEDICAL LABORATORY

## 2023-02-22 PROCEDURE — 3074F SYST BP LT 130 MM HG: CPT | Mod: ,,, | Performed by: OBSTETRICS & GYNECOLOGY

## 2023-02-22 PROCEDURE — 87491 CHLMYD TRACH DNA AMP PROBE: CPT | Mod: ,,, | Performed by: CLINICAL MEDICAL LABORATORY

## 2023-02-22 RX ORDER — METHOCARBAMOL 750 MG/1
750 TABLET, FILM COATED ORAL 3 TIMES DAILY
Qty: 270 TABLET | Refills: 1 | Status: SHIPPED | OUTPATIENT
Start: 2023-02-22 | End: 2023-05-23

## 2023-02-22 RX ORDER — ESTRADIOL 1 MG/1
1 TABLET ORAL DAILY
Qty: 30 TABLET | Refills: 11 | Status: SHIPPED | OUTPATIENT
Start: 2023-02-22 | End: 2024-03-21

## 2023-02-22 NOTE — PROGRESS NOTES
Neyda Claire female  for   Chief Complaint   Patient presents with    Gynecologic Exam    Pelvic Pain    Hot Flashes    Abdominal Pain      OB History          4    Para   3    Term                AB   1    Living   3         SAB        IAB        Ectopic        Multiple        Live Births                      PHI:  62-year-old  4, para 3, AB1  female presents for evaluation.  She complains of weight loss.     The patient states that she has had severe episodic pain that is in the epigastrium going straight to her back as well as sometimes initiating from the right lower quadrant.  Patient has noted recently some rectal bleeding with mucus and the pain is relieved when she does experience rectal bleeding.      Patient states she also has nocturia for the last 6 months 3 times per night.  She denies any daytime overactive bladder issues.  She does relate with coughing she does have some minor stress incontinence but does not wear perineal pad for protection.  Patient does a lot of lifting in her job at [employer].  She does complain of back pain well working.    Patient complains of many years of hot flashes.  Patient currently is not sexually active.  Patient denies any vaginal dryness or burning pain in the vagina.  She is on no estrogen replacement therapy.    Patient does have a long history of possible bipolar disorder and she does complain of depression but no suicidal ideation.  She complains significantly a muscle next spasm.    Past Medical History:   Diagnosis Date    Bipolar disorder, unspecified     COPD (chronic obstructive pulmonary disease)     Mental disorder       Past Surgical History:   Procedure Laterality Date    HYSTERECTOMY      TUBAL LIGATION        Review of patient's allergies indicates:   Allergen Reactions    Sulfa (sulfonamide antibiotics)         ROS:Pertinent items are noted in HPI.    Physical exam:    BP (!) 127/94   Pulse 73   Temp 98.3 °F (36.8 °C)    "Resp 16   Ht 5' 5" (1.651 m)   Wt 39 kg (86 lb)   BMI 14.31 kg/m²      General Appearance: healthy, alert, mild distress, smiling, extremely slender with a BMI of 14.31 ; no subcutaneous fat tissue on her body               HEENT: Head: Normocephalic, no lesions, without obvious abnormality; left trapezius left neck muscle spasm tenderness on palpation.    Neuro: normal exam    Lymphatic: no palpable lymphadenopathy, no hepatosplenomegaly    Chest:            Breast: normal appearance, no masses or tenderness, Inspection negative, No nipple retraction or dimpling, No nipple discharge or bleeding, No axillary or supraclavicular adenopathy, Normal to palpation without dominant masses, very severe breast hypoplasia            Lung: chest clear, no wheezing, rales, normal symmetric air entry            Heart: regular rate and rhythm, S1, S2 normal, no murmur, click, rub or gallop    Abdomen: soft, non-tender, without masses or organomegaly, normal bowel sounds, without guarding, without rebound, and tenderness in the right lower quadrant no rebound tenderness; no ascites; no abdominal bruit; no subcutaneous fat and very scaphoid.  There is some epigastric tenderness as well.    Pelvic:            Vulva: Normal vulva: no lesions, masses, epithelial changes, or exudate, no subcutaneous fat tissue on the vulva hormones pubis           Vagina: normal mucosa, no discharge, atrophic, no cystocele, no rectocele no vaginal vault prolapse or urethrocele; no gross urinary stress incontinence with no evidence of a hyper mobile urethra           Uterus: Uterus / cervix surgically absent           Adnexae:  Unable to palpate adnexae    Rectal: negative, stool guaiac positive    Extremity: normal    Skin: normal exam        Assessment:   Problem List Items Addressed This Visit          Endocrine    Weight loss    Relevant Orders    Ambulatory referral/consult to Gastroenterology    ThinPrep Pap Test (Completed)    CBC Auto " Differential (Completed)    Cancer Antigen 19-9 (Completed)     (Completed)    CEA (Completed)    Comprehensive Metabolic Panel (Completed)    DHEA-Sulfate (Completed)    Hemoglobin A1C (Completed)    Lipid Panel (Completed)    Vitamin D (Completed)    Urinalysis (Completed)    Thyroid Panel (Completed)    Sedimentation Rate (Completed)    C-Reactive Protein (Completed)    TESTOSTERONE, FREE (DIALYSIS) AND TOTAL, LC/MS/MS    Cortisol, PM (Completed)    HPV DNA probe, amplified    CT/GC, PCR (THINPREP)       GI    Rectal bleeding    Relevant Orders    Ambulatory referral/consult to Gastroenterology    ThinPrep Pap Test (Completed)    CBC Auto Differential (Completed)    Cancer Antigen 19-9 (Completed)     (Completed)    CEA (Completed)    Comprehensive Metabolic Panel (Completed)    DHEA-Sulfate (Completed)    Hemoglobin A1C (Completed)    Lipid Panel (Completed)    Vitamin D (Completed)    Urinalysis (Completed)    Thyroid Panel (Completed)    Sedimentation Rate (Completed)    C-Reactive Protein (Completed)    HPV DNA probe, amplified    CT/GC, PCR (THINPREP)    Epigastric pain - Primary    Relevant Orders    Ambulatory referral/consult to Gastroenterology    ThinPrep Pap Test (Completed)    CBC Auto Differential (Completed)    Cancer Antigen 19-9 (Completed)     (Completed)    CEA (Completed)    Comprehensive Metabolic Panel (Completed)    DHEA-Sulfate (Completed)    Hemoglobin A1C (Completed)    Lipid Panel (Completed)    Vitamin D (Completed)    Urinalysis (Completed)    Thyroid Panel (Completed)    Sedimentation Rate (Completed)    C-Reactive Protein (Completed)    CT Abdomen With Without Contrast    Cortisol, PM (Completed)    HPV DNA probe, amplified    CT/GC, PCR (THINPREP)       Other    Hot flashes    Relevant Orders    Ambulatory referral/consult to Gastroenterology    ThinPrep Pap Test (Completed)    CBC Auto Differential (Completed)    Cancer Antigen 19-9 (Completed)      (Completed)    CEA (Completed)    Comprehensive Metabolic Panel (Completed)    DHEA-Sulfate (Completed)    Hemoglobin A1C (Completed)    Lipid Panel (Completed)    Vitamin D (Completed)    Urinalysis (Completed)    Thyroid Panel (Completed)    Sedimentation Rate (Completed)    C-Reactive Protein (Completed)    TESTOSTERONE, FREE (DIALYSIS) AND TOTAL, LC/MS/MS    HPV DNA probe, amplified    CT/GC, PCR (THINPREP)     Other Visit Diagnoses       Occult blood in stools        Asthenia due to disease        Vitamin D deficiency        Relevant Orders    Ambulatory referral/consult to Gastroenterology    ThinPrep Pap Test (Completed)    CBC Auto Differential (Completed)    Cancer Antigen 19-9 (Completed)     (Completed)    CEA (Completed)    Comprehensive Metabolic Panel (Completed)    DHEA-Sulfate (Completed)    Hemoglobin A1C (Completed)    Lipid Panel (Completed)    Vitamin D (Completed)    Urinalysis (Completed)    Thyroid Panel (Completed)    Sedimentation Rate (Completed)    C-Reactive Protein (Completed)    HPV DNA probe, amplified    CT/GC, PCR (THINPREP)    Nocturia more than twice per night        For the last 6 months 3 times per night    Relevant Orders    Ambulatory referral/consult to Gastroenterology    ThinPrep Pap Test (Completed)    CBC Auto Differential (Completed)    Cancer Antigen 19-9 (Completed)     (Completed)    CEA (Completed)    Comprehensive Metabolic Panel (Completed)    DHEA-Sulfate (Completed)    Hemoglobin A1C (Completed)    Lipid Panel (Completed)    Vitamin D (Completed)    Urinalysis (Completed)    Thyroid Panel (Completed)    Sedimentation Rate (Completed)    C-Reactive Protein (Completed)    HPV DNA probe, amplified    CT/GC, PCR (THINPREP)    Cervical paraspinal muscle spasm        Bipolar affective disorder, remission status unspecified        Relevant Orders    Cortisol, PM (Completed)    Depression, unspecified depression type        Relevant Orders    TESTOSTERONE,  FREE (DIALYSIS) AND TOTAL, LC/MS/MS    Screening mammogram, encounter for        Relevant Orders    Mammo Digital Screening Bilat    Left upper quadrant pain        Relevant Orders    US OB <14 Wks TransAbd & TransVag, Single Gestation (XPD)    CT Abdomen With Without Contrast             Plan:  Screening labs; check vitamin-D level; CT of the abdomen; pelvic ultrasound; Pap smear; hemoccult screen was positive today; , CA 19-9 and CEA level to check tumor markers; monitor weight; check cortisol level and thyroid; check gonadotropins and estradiol/testosterone            Dr. Alf Miranda will initiate estradiol therapy 1 mg; for neck spasms Dr. Alf Miranda will initiate Robaxin therapy and recommend massage therapy    Dr. Alf Miranda recommends mammography and also colonoscopy; probable needs a GI consult in the near future; nutritional consult    Addendum on 2/27/2023    Her tumor GI markers are elevated - concern for GI tumor with pain such as clon or pancrease.    Vitamin D is low:  Vitamin D 25-Hydroxy, Blood ng/mL 14.4    Comment: Vitamin D 25-OH Adult Reference Values:   Deficiency: <20 ng/mL   Insufficiency: 20 - <30 ng/mL   Sufficiency: 30 -100 ng/mL     Vitamin D 25-OH Pediatric Reference Values:   Deficiency: <15 ng/mL   Insufficiency: 15 - <20 ng/mL        Recommend an abdominal CT scan for tumor of pancrease /colon. Patient needs Vitamin D3 4000 units OTC and Vitamin D2 50,000 units twice weekly - recheck vitamin D in 3-4 months.    Addendum on 04/04/2023:  Abnormal CT with abnormal common bile duct; radiology recommends ultrasound of the abdomen-upper abdomen.  This will be ordered

## 2023-02-22 NOTE — PATIENT INSTRUCTIONS
Dr. Alf Miranda thanks you for this office visit at [facility].    Diagnosis for this visit:   Problem List Items Addressed This Visit          Endocrine    Weight loss    Relevant Orders    Ambulatory referral/consult to Gastroenterology    ThinPrep Pap Test    CBC Auto Differential    Cancer Antigen 19-9        CEA    Comprehensive Metabolic Panel    DHEA-Sulfate    Hemoglobin A1C    Lipid Panel    Vitamin D    Urinalysis    Thyroid Panel    Testosterone, Free & Total    Sedimentation Rate    C-Reactive Protein       GI    Rectal bleeding    Relevant Orders    Ambulatory referral/consult to Gastroenterology    ThinPrep Pap Test    CBC Auto Differential    Cancer Antigen 19-9        CEA    Comprehensive Metabolic Panel    DHEA-Sulfate    Hemoglobin A1C    Lipid Panel    Vitamin D    Urinalysis    Thyroid Panel    Testosterone, Free & Total    Sedimentation Rate    C-Reactive Protein    Epigastric pain - Primary    Relevant Orders    Ambulatory referral/consult to Gastroenterology    ThinPrep Pap Test    CBC Auto Differential    Cancer Antigen 19-9        CEA    Comprehensive Metabolic Panel    DHEA-Sulfate    Hemoglobin A1C    Lipid Panel    Vitamin D    Urinalysis    Thyroid Panel    Testosterone, Free & Total    Sedimentation Rate    C-Reactive Protein       Other    Hot flashes    Relevant Orders    Ambulatory referral/consult to Gastroenterology    ThinPrep Pap Test    CBC Auto Differential    Cancer Antigen 19-9        CEA    Comprehensive Metabolic Panel    DHEA-Sulfate    Hemoglobin A1C    Lipid Panel    Vitamin D    Urinalysis    Thyroid Panel    Testosterone, Free & Total    Sedimentation Rate    C-Reactive Protein     Other Visit Diagnoses       Nocturia more than twice per night        For the last 6 months 3 times per night    Relevant Orders    Ambulatory referral/consult to Gastroenterology    ThinPrep Pap Test    CBC Auto Differential    Cancer Antigen 19-9    CA  125    CEA    Comprehensive Metabolic Panel    DHEA-Sulfate    Hemoglobin A1C    Lipid Panel    Vitamin D    Urinalysis    Thyroid Panel    Testosterone, Free & Total    Sedimentation Rate    C-Reactive Protein    Vitamin D deficiency        Relevant Orders    Ambulatory referral/consult to Gastroenterology    ThinPrep Pap Test    CBC Auto Differential    Cancer Antigen 19-9        CEA    Comprehensive Metabolic Panel    DHEA-Sulfate    Hemoglobin A1C    Lipid Panel    Vitamin D    Urinalysis    Thyroid Panel    Testosterone, Free & Total    Sedimentation Rate    C-Reactive Protein    Cervical paraspinal muscle spasm        Bipolar affective disorder, remission status unspecified        Depression, unspecified depression type                 The Plan: Screening labs; check vitamin-D level; CT of the abdomen; pelvic ultrasound; Pap smear; hemoccult screen was positive today; , CA 19-9 and CEA level; monitor weight; check assault level; check gonadotropins and estradiol/testosterone             Dr. Alf Miranda will initiate estradiol therapy 1 mg; for neck spasms Dr. Alf Miranda will initiate Robaxin therapy and recommend massage therapy     Dr. Alf Miranda recommends mammography and also colonoscopy; probable GI consult in the near future; nutritional consult         Please practice best food and exercise habits for your age.    Dr. Alf Miranda recommends avoidance of smoking and illicit medications or habits.    Please call  or schedule for any change in your health.     Please keep the next scheduled appointment or call for any need to change the appointment.     Dr. Alf Miranda recommends yearly exams for good health maintenance.      Thank you    Dr. Alf Miranda  02/22/2023 4:54 PM

## 2023-02-23 LAB — CORTIS PM SERPL-MCNC: 9.5 ΜG/DL (ref 3.1–16.7)

## 2023-02-25 LAB
GH SERPL-MCNC: NORMAL NG/ML
INSULIN SERPL-ACNC: NORMAL U[IU]/ML
LAB AP CLINICAL INFORMATION: NORMAL
LAB AP GYN INTERPRETATION: NEGATIVE
LAB AP PAP DISCLAIMER COMMENTS: NORMAL
RENIN PLAS-CCNC: NORMAL NG/ML/H

## 2023-02-27 LAB
CHLAMYDIA BY PCR: NEGATIVE
HPV 16: NEGATIVE
HPV 18: NEGATIVE
HPV OTHER: NEGATIVE
N. GONORRHOEAE (GC) BY PCR: NEGATIVE

## 2023-02-27 RX ORDER — ERGOCALCIFEROL 1.25 MG/1
50000 CAPSULE ORAL
Qty: 24 CAPSULE | Refills: 3 | Status: SHIPPED | OUTPATIENT
Start: 2023-03-02 | End: 2023-08-07

## 2023-03-03 ENCOUNTER — HOSPITAL ENCOUNTER (OUTPATIENT)
Dept: RADIOLOGY | Facility: HOSPITAL | Age: 63
Discharge: HOME | End: 2023-03-03
Attending: OBSTETRICS & GYNECOLOGY
Payer: COMMERCIAL

## 2023-03-03 DIAGNOSIS — R10.12 LEFT UPPER QUADRANT PAIN: ICD-10-CM

## 2023-03-03 LAB
TESTOST FREE SERPL-MCNC: <0.13 NG/DL
TESTOST SERPL-MCNC: 7.6 NG/DL (ref 8–60)

## 2023-03-03 PROCEDURE — 76856 US EXAM PELVIC COMPLETE: CPT | Mod: TC

## 2023-03-03 PROCEDURE — 76856 US EXAM PELVIC COMPLETE: CPT | Mod: 26,,, | Performed by: STUDENT IN AN ORGANIZED HEALTH CARE EDUCATION/TRAINING PROGRAM

## 2023-03-03 PROCEDURE — 76856 US PELVIS COMPLETE NON OB: ICD-10-PCS | Mod: 26,,, | Performed by: STUDENT IN AN ORGANIZED HEALTH CARE EDUCATION/TRAINING PROGRAM

## 2023-03-13 ENCOUNTER — TELEPHONE (OUTPATIENT)
Dept: OBSTETRICS AND GYNECOLOGY | Facility: CLINIC | Age: 63
End: 2023-03-13
Payer: COMMERCIAL

## 2023-03-13 NOTE — TELEPHONE ENCOUNTER
----- Message from Alf Miranda MD   Schedule CT scan of the abdomen and script sent to her pharmacy for vitamin D2; she needs to start 4000 units D3 OTC. Tumor markers elevated for Ca 19.9 and CEA and / sed rate elevated as well.    Recommend an abdominal CT scan for tumor of pancrease /colon. Patient needs Vitamin D3 4000 units OTC and Vitamin D2 50,000 units twice weekly - recheck vitamin D in 3-4 months.    VM left on pt phone informing her of appt for CT shceduled on 03/23 @ 9am

## 2023-03-20 ENCOUNTER — TELEPHONE (OUTPATIENT)
Dept: OBSTETRICS AND GYNECOLOGY | Facility: CLINIC | Age: 63
End: 2023-03-20
Payer: COMMERCIAL

## 2023-03-24 NOTE — TELEPHONE ENCOUNTER
Appointment for CT is on April 3, 2023 and she will see Dr. Miranda on April 5, 2023 for follow up visit. Patient voiced understanding.

## 2023-04-03 ENCOUNTER — HOSPITAL ENCOUNTER (OUTPATIENT)
Dept: RADIOLOGY | Facility: HOSPITAL | Age: 63
Discharge: HOME | End: 2023-04-03
Attending: OBSTETRICS & GYNECOLOGY
Payer: COMMERCIAL

## 2023-04-03 DIAGNOSIS — R10.12 LEFT UPPER QUADRANT PAIN: ICD-10-CM

## 2023-04-03 DIAGNOSIS — R10.13 EPIGASTRIC PAIN: ICD-10-CM

## 2023-04-03 LAB — CREAT SERPL-MCNC: 0.6 MG/DL (ref 0.6–1.3)

## 2023-04-03 PROCEDURE — 74170 CT ABDOMEN W WO CONTRAST: ICD-10-PCS | Mod: 26,,, | Performed by: RADIOLOGY

## 2023-04-03 PROCEDURE — 74170 CT ABD WO CNTRST FLWD CNTRST: CPT | Mod: TC

## 2023-04-03 PROCEDURE — 25500020 PHARM REV CODE 255: Performed by: OBSTETRICS & GYNECOLOGY

## 2023-04-03 PROCEDURE — 74170 CT ABD WO CNTRST FLWD CNTRST: CPT | Mod: 26,,, | Performed by: RADIOLOGY

## 2023-04-03 PROCEDURE — 82565 ASSAY OF CREATININE: CPT

## 2023-04-03 RX ADMIN — IOPAMIDOL 100 ML: 755 INJECTION, SOLUTION INTRAVENOUS at 11:04

## 2023-04-04 DIAGNOSIS — R10.13 EPIGASTRIC PAIN: Primary | ICD-10-CM

## 2023-04-05 ENCOUNTER — HOSPITAL ENCOUNTER (OUTPATIENT)
Dept: RADIOLOGY | Facility: HOSPITAL | Age: 63
Discharge: HOME | End: 2023-04-05
Attending: OBSTETRICS & GYNECOLOGY
Payer: COMMERCIAL

## 2023-04-05 ENCOUNTER — TELEPHONE (OUTPATIENT)
Dept: OBSTETRICS AND GYNECOLOGY | Facility: CLINIC | Age: 63
End: 2023-04-05
Payer: COMMERCIAL

## 2023-04-05 ENCOUNTER — OFFICE VISIT (OUTPATIENT)
Dept: OBSTETRICS AND GYNECOLOGY | Facility: CLINIC | Age: 63
End: 2023-04-05
Payer: COMMERCIAL

## 2023-04-05 VITALS
WEIGHT: 87.19 LBS | SYSTOLIC BLOOD PRESSURE: 128 MMHG | BODY MASS INDEX: 14.53 KG/M2 | RESPIRATION RATE: 16 BRPM | DIASTOLIC BLOOD PRESSURE: 93 MMHG | HEIGHT: 65 IN | TEMPERATURE: 98 F | HEART RATE: 83 BPM

## 2023-04-05 DIAGNOSIS — R23.2 HOT FLASHES: ICD-10-CM

## 2023-04-05 DIAGNOSIS — M25.511 CHRONIC PAIN OF BOTH SHOULDERS: ICD-10-CM

## 2023-04-05 DIAGNOSIS — K02.9 PAIN DUE TO DENTAL CARIES: ICD-10-CM

## 2023-04-05 DIAGNOSIS — K82.9 GALLBLADDER DISEASE: ICD-10-CM

## 2023-04-05 DIAGNOSIS — R10.13 EPIGASTRIC PAIN: ICD-10-CM

## 2023-04-05 DIAGNOSIS — K62.5 RECTAL BLEEDING: ICD-10-CM

## 2023-04-05 DIAGNOSIS — R97.0 ELEVATED CARCINOEMBRYONIC ANTIGEN (CEA): ICD-10-CM

## 2023-04-05 DIAGNOSIS — M25.512 CHRONIC PAIN OF BOTH SHOULDERS: ICD-10-CM

## 2023-04-05 DIAGNOSIS — G89.29 CHRONIC PAIN OF BOTH SHOULDERS: ICD-10-CM

## 2023-04-05 DIAGNOSIS — R10.13 EPIGASTRIC PAIN: Primary | ICD-10-CM

## 2023-04-05 DIAGNOSIS — R63.4 WEIGHT LOSS: ICD-10-CM

## 2023-04-05 PROCEDURE — 1160F PR REVIEW ALL MEDS BY PRESCRIBER/CLIN PHARMACIST DOCUMENTED: ICD-10-PCS | Mod: ,,, | Performed by: OBSTETRICS & GYNECOLOGY

## 2023-04-05 PROCEDURE — 3074F SYST BP LT 130 MM HG: CPT | Mod: ,,, | Performed by: OBSTETRICS & GYNECOLOGY

## 2023-04-05 PROCEDURE — 3080F DIAST BP >= 90 MM HG: CPT | Mod: ,,, | Performed by: OBSTETRICS & GYNECOLOGY

## 2023-04-05 PROCEDURE — 1160F RVW MEDS BY RX/DR IN RCRD: CPT | Mod: ,,, | Performed by: OBSTETRICS & GYNECOLOGY

## 2023-04-05 PROCEDURE — 1159F PR MEDICATION LIST DOCUMENTED IN MEDICAL RECORD: ICD-10-PCS | Mod: ,,, | Performed by: OBSTETRICS & GYNECOLOGY

## 2023-04-05 PROCEDURE — 76700 US EXAM ABDOM COMPLETE: CPT | Mod: 26,,, | Performed by: RADIOLOGY

## 2023-04-05 PROCEDURE — 99214 OFFICE O/P EST MOD 30 MIN: CPT | Mod: PBBFAC,25 | Performed by: OBSTETRICS & GYNECOLOGY

## 2023-04-05 PROCEDURE — 76700 US ABDOMEN COMPLETE: ICD-10-PCS | Mod: 26,,, | Performed by: RADIOLOGY

## 2023-04-05 PROCEDURE — 3008F BODY MASS INDEX DOCD: CPT | Mod: ,,, | Performed by: OBSTETRICS & GYNECOLOGY

## 2023-04-05 PROCEDURE — 99214 PR OFFICE/OUTPT VISIT, EST, LEVL IV, 30-39 MIN: ICD-10-PCS | Mod: S$PBB,,, | Performed by: OBSTETRICS & GYNECOLOGY

## 2023-04-05 PROCEDURE — 76700 US EXAM ABDOM COMPLETE: CPT | Mod: TC

## 2023-04-05 PROCEDURE — 3074F PR MOST RECENT SYSTOLIC BLOOD PRESSURE < 130 MM HG: ICD-10-PCS | Mod: ,,, | Performed by: OBSTETRICS & GYNECOLOGY

## 2023-04-05 PROCEDURE — 99214 OFFICE O/P EST MOD 30 MIN: CPT | Mod: S$PBB,,, | Performed by: OBSTETRICS & GYNECOLOGY

## 2023-04-05 PROCEDURE — 96372 THER/PROPH/DIAG INJ SC/IM: CPT | Mod: PBBFAC | Performed by: OBSTETRICS & GYNECOLOGY

## 2023-04-05 PROCEDURE — 1159F MED LIST DOCD IN RCRD: CPT | Mod: ,,, | Performed by: OBSTETRICS & GYNECOLOGY

## 2023-04-05 PROCEDURE — 3044F PR MOST RECENT HEMOGLOBIN A1C LEVEL <7.0%: ICD-10-PCS | Mod: ,,, | Performed by: OBSTETRICS & GYNECOLOGY

## 2023-04-05 PROCEDURE — 3008F PR BODY MASS INDEX (BMI) DOCUMENTED: ICD-10-PCS | Mod: ,,, | Performed by: OBSTETRICS & GYNECOLOGY

## 2023-04-05 PROCEDURE — 3044F HG A1C LEVEL LT 7.0%: CPT | Mod: ,,, | Performed by: OBSTETRICS & GYNECOLOGY

## 2023-04-05 PROCEDURE — 3080F PR MOST RECENT DIASTOLIC BLOOD PRESSURE >= 90 MM HG: ICD-10-PCS | Mod: ,,, | Performed by: OBSTETRICS & GYNECOLOGY

## 2023-04-05 RX ORDER — CEFTRIAXONE 1 G/1
1 INJECTION, POWDER, FOR SOLUTION INTRAMUSCULAR; INTRAVENOUS
Status: COMPLETED | OUTPATIENT
Start: 2023-04-05 | End: 2023-04-05

## 2023-04-05 RX ORDER — CEPHALEXIN 500 MG/1
500 CAPSULE ORAL 4 TIMES DAILY
Qty: 40 CAPSULE | Refills: 0 | Status: SHIPPED | OUTPATIENT
Start: 2023-04-05 | End: 2023-04-15

## 2023-04-05 RX ORDER — KETOROLAC TROMETHAMINE 30 MG/ML
30 INJECTION, SOLUTION INTRAMUSCULAR; INTRAVENOUS
Status: COMPLETED | OUTPATIENT
Start: 2023-04-05 | End: 2023-04-05

## 2023-04-05 RX ORDER — DICLOFENAC POTASSIUM 50 MG/1
50 TABLET, FILM COATED ORAL 3 TIMES DAILY
Qty: 90 TABLET | Refills: 1 | Status: SHIPPED | OUTPATIENT
Start: 2023-04-05 | End: 2023-04-12

## 2023-04-05 RX ADMIN — KETOROLAC TROMETHAMINE 30 MG: 30 INJECTION, SOLUTION INTRAMUSCULAR at 11:04

## 2023-04-05 RX ADMIN — CEFTRIAXONE SODIUM 1 G: 1 INJECTION, POWDER, FOR SOLUTION INTRAMUSCULAR; INTRAVENOUS at 11:04

## 2023-04-05 NOTE — PATIENT INSTRUCTIONS
Dr. Alf Miranda thanks you for this office visit at [facility].    Diagnosis for this visit:   Problem List Items Addressed This Visit          Endocrine    Weight loss       GI    Epigastric pain - Primary       Other    Hot flashes     Other Visit Diagnoses       Pain due to dental caries        Right lower incisors    Elevated carcinoembryonic antigen (CEA)        Chronic pain of both shoulders        Gallbladder disease                 The Plan:   CBC; Toradol 30 mg IM; Rocephin 1 g; Keflex orally; she is to make an immediate appointment with her dentist; referral to orthopedics for possible rotator cuff disorder and shoulder arthritis bilaterally            Referral to GI Medicine for abnormal tumor markers and for follow-up on the abnormal ultrasound of the gallbladder      Please practice best food and exercise habits for your age.    Dr. Alf Miranda recommends avoidance of smoking and illicit medications or habits.    Please call  or schedule for any change in your health.     Please keep the next scheduled appointment or call for any need to change the appointment.     Dr. Alf Miranda recommends yearly exams for good health maintenance.      Thank you    Dr. Alf Miranda  04/05/2023 11:33 AM

## 2023-04-05 NOTE — TELEPHONE ENCOUNTER
Called patient yesterday and LVM. Called back this morning patient stated she did get my voicemail and will be going to ultrasound appointment this morning.     ----- Message from Alf Miranda MD sent at 4/4/2023  7:21 AM CDT -----  Call patient  Patient needs a upper abdominal ultrasound before I see this patient.  Her common bile duct is very prominent.  There is no evidence of tumor.

## 2023-04-05 NOTE — PROGRESS NOTES
"Neyda Claire female  for   Chief Complaint   Patient presents with    Follow-up     Labs, ultrasound, and ct results    Severe lower denture tooth pain    Shoulder Pain     Bilateral-worse on the right side          PHI:  Patient presents for follow-up.  Her labs were reviewed.  Patient has a elevated CA 19.9 at near 7 and her CEA level is also elevated.  Patient has chronic episodic epigastric pain.  Her CT scan recommended a ultrasound.  Yesterday's ultrasound revealed some distention of the gallbladder but no sludge or stones were noted.  CT revealed no evidence of any solid tumor in the region of the pancreas.    Patient is complaining acutely since yesterday of severe pain due to dental caries and disease in the lower mandible on the right.    She is complaining of increasing chronic bilateral shoulder pain.  Robaxin was ordered last visit for muscle tenseness.  This did not help.  She was requesting Zanaflex and Dr. Alf Miranda refused anteflexed since she does not prescribes anteflexed.    Past Medical History:   Diagnosis Date    Bipolar disorder, unspecified     COPD (chronic obstructive pulmonary disease)     Mental disorder       Past Surgical History:   Procedure Laterality Date    HYSTERECTOMY      TUBAL LIGATION        Review of patient's allergies indicates:   Allergen Reactions    Sulfa (sulfonamide antibiotics)         ROS:Pertinent items are noted in HPI.    Physical exam:    BP (!) 128/93   Pulse 83   Temp 97.6 °F (36.4 °C)   Resp 16   Ht 5' 5" (1.651 m)   Wt 39.6 kg (87 lb 3.2 oz)   BMI 14.51 kg/m²      General Appearance: alert, mild distress, near tears due to acute pain of the right dental caries -mandibular pain; possibly lymph node tender on the right mandible area; patient very aesthetic-skinny in appearance-no change since last exam    HEENT:  Severe dental caries with probable small abscess lower right mandibular just passed incisors; tender submandibular lymph node; upper denture " is noted  Chest:           Lungs: clear to auscultation bilaterally           Heart: regular rate and rhythm, S1, S2 normal, no murmur, click, rub or gallop    Abdomen:  Mild epigastric tenderness on deep palpation no rebound tenderness  Pelvic: not performed     Extremity:  Rotation of both upper shoulders revealed crepitation and rotation limited with onset of patient's complaint of bilateral shoulder pain-right worse than the left  Skin: normal exam with no subcutaneous fat due to asthenia        Assessment:   Problem List Items Addressed This Visit          Endocrine    Weight loss       GI    Epigastric pain - Primary       Other    Hot flashes     Other Visit Diagnoses       Pain due to dental caries        Right lower incisors    Elevated carcinoembryonic antigen (CEA)        Chronic pain of both shoulders        Gallbladder disease                 Plan:  CBC; Toradol 30 mg IM; Rocephin 1 g; Keflex orally; she is to make an immediate appointment with her dentist -patient prior needs extraction of the affected dental caries tooth.; referral to orthopedics for possible rotator cuff disorder and shoulder arthritis bilaterally            Referral to GI Medicine for abnormal tumor markers and for follow-up on the abnormal ultrasound of the gallbladder

## 2023-04-10 DIAGNOSIS — M25.512 BILATERAL SHOULDER PAIN, UNSPECIFIED CHRONICITY: Primary | ICD-10-CM

## 2023-04-10 DIAGNOSIS — M25.511 BILATERAL SHOULDER PAIN, UNSPECIFIED CHRONICITY: Primary | ICD-10-CM

## 2023-04-12 ENCOUNTER — OFFICE VISIT (OUTPATIENT)
Dept: ORTHOPEDICS | Facility: CLINIC | Age: 63
End: 2023-04-12
Payer: COMMERCIAL

## 2023-04-12 ENCOUNTER — HOSPITAL ENCOUNTER (OUTPATIENT)
Dept: RADIOLOGY | Facility: HOSPITAL | Age: 63
Discharge: HOME | End: 2023-04-12
Attending: NURSE PRACTITIONER
Payer: COMMERCIAL

## 2023-04-12 DIAGNOSIS — M25.512 CHRONIC PAIN OF BOTH SHOULDERS: ICD-10-CM

## 2023-04-12 DIAGNOSIS — G89.29 CHRONIC PAIN OF BOTH SHOULDERS: ICD-10-CM

## 2023-04-12 DIAGNOSIS — M25.512 BILATERAL SHOULDER PAIN, UNSPECIFIED CHRONICITY: ICD-10-CM

## 2023-04-12 DIAGNOSIS — M54.2 NECK PAIN: Primary | ICD-10-CM

## 2023-04-12 DIAGNOSIS — M25.511 CHRONIC PAIN OF BOTH SHOULDERS: ICD-10-CM

## 2023-04-12 DIAGNOSIS — M25.511 BILATERAL SHOULDER PAIN, UNSPECIFIED CHRONICITY: ICD-10-CM

## 2023-04-12 PROCEDURE — 99203 OFFICE O/P NEW LOW 30 MIN: CPT | Mod: S$PBB,25,, | Performed by: NURSE PRACTITIONER

## 2023-04-12 PROCEDURE — 1159F MED LIST DOCD IN RCRD: CPT | Mod: ,,, | Performed by: NURSE PRACTITIONER

## 2023-04-12 PROCEDURE — 99203 PR OFFICE/OUTPT VISIT, NEW, LEVL III, 30-44 MIN: ICD-10-PCS | Mod: S$PBB,25,, | Performed by: NURSE PRACTITIONER

## 2023-04-12 PROCEDURE — 20610 LARGE JOINT ASPIRATION/INJECTION: R SUBACROMIAL BURSA: ICD-10-PCS | Mod: S$PBB,RT,, | Performed by: NURSE PRACTITIONER

## 2023-04-12 PROCEDURE — 73030 X-RAY EXAM OF SHOULDER: CPT | Mod: TC,50

## 2023-04-12 PROCEDURE — 3044F HG A1C LEVEL LT 7.0%: CPT | Mod: ,,, | Performed by: NURSE PRACTITIONER

## 2023-04-12 PROCEDURE — 3044F PR MOST RECENT HEMOGLOBIN A1C LEVEL <7.0%: ICD-10-PCS | Mod: ,,, | Performed by: NURSE PRACTITIONER

## 2023-04-12 PROCEDURE — 1159F PR MEDICATION LIST DOCUMENTED IN MEDICAL RECORD: ICD-10-PCS | Mod: ,,, | Performed by: NURSE PRACTITIONER

## 2023-04-12 PROCEDURE — 99213 OFFICE O/P EST LOW 20 MIN: CPT | Mod: PBBFAC,25 | Performed by: NURSE PRACTITIONER

## 2023-04-12 PROCEDURE — 73030 X-RAY EXAM OF SHOULDER: CPT | Mod: 26,RT,, | Performed by: RADIOLOGY

## 2023-04-12 PROCEDURE — 20610 DRAIN/INJ JOINT/BURSA W/O US: CPT | Mod: PBBFAC | Performed by: NURSE PRACTITIONER

## 2023-04-12 PROCEDURE — 73030 PR  X-RAY SHOULDER 2+ VW: ICD-10-PCS | Mod: 26,RT,, | Performed by: RADIOLOGY

## 2023-04-12 PROCEDURE — 73030 X-RAY EXAM OF SHOULDER: CPT | Mod: 26,LT,, | Performed by: RADIOLOGY

## 2023-04-12 RX ORDER — MELOXICAM 7.5 MG/1
7.5 TABLET ORAL DAILY
Qty: 30 TABLET | Refills: 1 | Status: SHIPPED | OUTPATIENT
Start: 2023-04-12 | End: 2023-08-04 | Stop reason: SDUPTHER

## 2023-04-12 RX ORDER — TRIAMCINOLONE ACETONIDE 40 MG/ML
80 INJECTION, SUSPENSION INTRA-ARTICULAR; INTRAMUSCULAR
Status: DISCONTINUED | OUTPATIENT
Start: 2023-04-12 | End: 2023-04-12 | Stop reason: HOSPADM

## 2023-04-12 RX ADMIN — TRIAMCINOLONE ACETONIDE 80 MG: 40 INJECTION, SUSPENSION INTRA-ARTICULAR; INTRAMUSCULAR at 09:04

## 2023-04-12 NOTE — PROGRESS NOTES
HPI:   Neyda Claire is a pleasant 62 y.o. patient who reports to clinic for evaluation of bilateral shoulder pain.   Reports she has had bilateral shoulder pain for years, right being the worst.  Right shoulder pain has been worse recently.  She also has pain to popping in her neck.  She occasionally has numbness that runs down her arm to her right hand.  Reports she is very active in her shoulder has been become painful and weak.  Injury onset and description:  No injury  Patient's occupation:   This  a work related injury.   This injury has been non-responsive to conservative care. The pain is worse with repetitive use, and strenuous activity is very difficult.  her pain improves with rest. PAST MEDICAL HISTORY:   Past Medical History:   Diagnosis Date    Bipolar disorder, unspecified     COPD (chronic obstructive pulmonary disease)     Mental disorder      PAST SURGICAL HISTORY:   Past Surgical History:   Procedure Laterality Date    HYSTERECTOMY      TUBAL LIGATION       MEDICATIONS:    Current Outpatient Medications:     albuterol (VENTOLIN HFA) 90 mcg/actuation inhaler, Inhale 2 puffs into the lungs every 6 (six) hours as needed for Wheezing. Rescue, Disp: 18 g, Rfl: 1    albuterol-ipratropium (DUO-NEB) 2.5 mg-0.5 mg/3 mL nebulizer solution, Take 3 mLs by nebulization every 4 (four) hours as needed for Wheezing. Rescue, Disp: 300 mL, Rfl: 0    cephALEXin (KEFLEX) 500 MG capsule, Take 1 capsule (500 mg total) by mouth 4 (four) times daily. for 10 days, Disp: 40 capsule, Rfl: 0    diclofenac (CATAFLAM) 50 MG tablet, Take 1 tablet (50 mg total) by mouth 3 (three) times daily., Disp: 90 tablet, Rfl: 1    ergocalciferol (VITAMIN D2) 50,000 unit Cap, Take 1 capsule (50,000 Units total) by mouth twice a week., Disp: 24 capsule, Rfl: 3    estradioL (ESTRACE) 1 MG tablet, Take 1 tablet (1 mg total) by mouth once daily., Disp: 30 tablet, Rfl: 11    meloxicam (MOBIC) 7.5 MG tablet, Take 1 tablet by mouth once daily,  Disp: 30 tablet, Rfl: 0    methocarbamoL (ROBAXIN) 750 MG Tab, Take 1 tablet (750 mg total) by mouth 3 (three) times daily., Disp: 270 tablet, Rfl: 1  ALLERGIES:   Review of patient's allergies indicates:   Allergen Reactions    Sulfa (sulfonamide antibiotics)          PHYSICAL EXAM:  VITAL SIGNS: There were no vitals taken for this visit.  General: Well-developed well-nourished 62 y.o. femalein no acute distress;Cardiovascular: Regular rhythm by palpation of distal pulse, normal color and temperature, no concerning varicosities on symptomatic side Lungs: No labored breathing or wheezing appreciated Neuro: Alert and oriented ×3 Psychiatric: well oriented to person, place and time, demonstrates normal mood and affect Skin: No rashes, lesions or ulcers, normal temperature, turgor, and texture on uninvolved extremity    General    Nursing note and vitals reviewed.  Constitutional: She is oriented to person, place, and time. She appears well-developed and well-nourished. No distress.   HENT:   Head: Normocephalic and atraumatic.   Nose: Nose normal.   Eyes: EOM are normal. Pupils are equal, round, and reactive to light.   Neck: Neck supple.   Cardiovascular:  Normal rate and intact distal pulses.            Pulmonary/Chest: Effort normal. No respiratory distress. She exhibits no tenderness.   Abdominal: Soft. She exhibits no distension. There is no abdominal tenderness.   Neurological: She is alert and oriented to person, place, and time. She has normal reflexes. No cranial nerve deficit. She exhibits normal muscle tone.   Psychiatric: She has a normal mood and affect. Her behavior is normal. Judgment and thought content normal.         Back (L-Spine & T-Spine) / Neck (C-Spine) Exam     Neck (C-Spine) Range of Motion   Flexion:      Limited  Extension:  Limited  Right Shoulder Exam     Inspection/Observation   Scars: absent  Deformity: absent  Scapular Winging: absent  Atrophy: absent    Tenderness   The patient is  tender to palpation of the acromioclavicular joint and biceps tendon.    Range of Motion   Forward Flexion:  normal   Forward Elevation: normal  Adduction: normal  External Rotation 0 degrees:  normal   Internal rotation 0 degrees:  normal   Internal rotation 90 degrees:  normal     Tests & Signs   Drop arm: negative  Lag Sign 0 degrees: negative  Lag Sign 90 degrees: negative  Active Compression Test (Comanche's Sign): positive  Yergason's Test: positive  Speed's Test: positive  Jerk Test: postive    Left Shoulder Exam     Tenderness   The patient is tender to palpation of the supraspinatus and biceps tendon.    Crepitus   The patient has crepitus of the acromion    Range of Motion   External Rotation 0 degrees:  normal   Internal rotation 0 degrees:  normal   Internal rotation 90 degrees:  normal     Tests & Signs   Apprehension: positive  Rotator Cuff Painful Arc/Range: moderate  Anterior Drawer Test: 2+  Relocation 90 degrees: positive  Jerk Test: positive       Muscle Strength   Right Upper Extremity   Supraspinatus: 4/5   Subscapularis: 4/5       IMAGING:  TECHNIQUE:  Internal and external rotation views of the right and left shoulder     COMPARISON:  None     FINDINGS:  No fracture dislocation of either shoulder.  Mild acromioclavicular joint degenerative change seen bilaterally.     Impression:     Mild degenerative changes        Electronically signed by: Matthew Barcenas  Date:                                            04/12/2023  Time:                                           09:14    ASSESSMENT:      ICD-10-CM ICD-9-CM   1. Chronic pain of both shoulders  M25.511 719.41    G89.29 338.29    M25.512        PLAN:     -Findings and treatment options were discussed with the patient  -All questions answered  Mobic daily.  We will set up formal PT for bilateral shoulder pain and weakness.  Right shoulder subacromial injection today.  We will consider referral to ortho spine next visit if not doing any better    There  are no Patient Instructions on file for this visit.  No orders of the defined types were placed in this encounter.    Large Joint Aspiration/Injection: R subacromial bursa    Date/Time: 4/12/2023 9:00 AM  Performed by: SHEILA Galdamez  Authorized by: SHEILA Galdamez     Consent Done?:  Yes (Verbal)  Indications:  Pain  Site marked: the procedure site was marked    Local anesthetic:  Bupivacaine 0.25% without epinephrine (4 cc's of 0.25% bupivicaine)    Details:  Needle Size:  22 G  Approach:  Posterior  Location:  Shoulder  Site:  R subacromial bursa  Medications:  80 mg triamcinolone acetonide 40 mg/mL  Patient tolerance:  Patient tolerated the procedure well with no immediate complications

## 2023-05-29 PROBLEM — K62.5 RECTAL BLEEDING: Status: RESOLVED | Noted: 2023-02-22 | Resolved: 2023-05-29

## 2023-07-05 ENCOUNTER — OFFICE VISIT (OUTPATIENT)
Dept: FAMILY MEDICINE | Facility: CLINIC | Age: 63
End: 2023-07-05
Payer: COMMERCIAL

## 2023-07-05 VITALS
DIASTOLIC BLOOD PRESSURE: 79 MMHG | HEIGHT: 68 IN | RESPIRATION RATE: 18 BRPM | WEIGHT: 86 LBS | HEART RATE: 78 BPM | OXYGEN SATURATION: 97 % | BODY MASS INDEX: 13.03 KG/M2 | SYSTOLIC BLOOD PRESSURE: 124 MMHG | TEMPERATURE: 99 F

## 2023-07-05 DIAGNOSIS — Z20.822 CONTACT WITH AND (SUSPECTED) EXPOSURE TO COVID-19: ICD-10-CM

## 2023-07-05 DIAGNOSIS — R63.4 WEIGHT LOSS: ICD-10-CM

## 2023-07-05 DIAGNOSIS — F32.A DEPRESSION, UNSPECIFIED DEPRESSION TYPE: ICD-10-CM

## 2023-07-05 DIAGNOSIS — R50.9 FEVER, UNSPECIFIED FEVER CAUSE: Primary | ICD-10-CM

## 2023-07-05 DIAGNOSIS — G47.00 INSOMNIA, UNSPECIFIED TYPE: Chronic | ICD-10-CM

## 2023-07-05 DIAGNOSIS — F31.9 BIPOLAR DEPRESSION: Chronic | ICD-10-CM

## 2023-07-05 DIAGNOSIS — E55.9 VITAMIN D DEFICIENCY: Chronic | ICD-10-CM

## 2023-07-05 DIAGNOSIS — R10.13 EPIGASTRIC PAIN: ICD-10-CM

## 2023-07-05 DIAGNOSIS — N10 ACUTE PYELONEPHRITIS: ICD-10-CM

## 2023-07-05 DIAGNOSIS — R10.11 RUQ ABDOMINAL PAIN: ICD-10-CM

## 2023-07-05 DIAGNOSIS — J20.9 ACUTE BRONCHITIS, UNSPECIFIED ORGANISM: ICD-10-CM

## 2023-07-05 DIAGNOSIS — R10.9 FLANK PAIN: ICD-10-CM

## 2023-07-05 DIAGNOSIS — R11.0 NAUSEA: ICD-10-CM

## 2023-07-05 DIAGNOSIS — R35.0 URINE FREQUENCY: ICD-10-CM

## 2023-07-05 DIAGNOSIS — R13.10 DYSPHAGIA, UNSPECIFIED TYPE: ICD-10-CM

## 2023-07-05 LAB
25(OH)D3 SERPL-MCNC: 212.5 NG/ML
ALBUMIN SERPL BCP-MCNC: 4.3 G/DL (ref 3.5–5)
ALBUMIN/GLOB SERPL: 1.3 {RATIO}
ALP SERPL-CCNC: 65 U/L (ref 50–130)
ALT SERPL W P-5'-P-CCNC: 29 U/L (ref 13–56)
AMYLASE SERPL-CCNC: 36 U/L (ref 25–115)
ANION GAP SERPL CALCULATED.3IONS-SCNC: 8 MMOL/L (ref 7–16)
AST SERPL W P-5'-P-CCNC: 24 U/L (ref 15–37)
BASOPHILS # BLD AUTO: 0.08 K/UL (ref 0–0.2)
BASOPHILS NFR BLD AUTO: 0.7 % (ref 0–1)
BILIRUB SERPL-MCNC: 0.3 MG/DL (ref ?–1.2)
BILIRUB UR QL STRIP: NEGATIVE
BUN SERPL-MCNC: 10 MG/DL (ref 7–18)
BUN/CREAT SERPL: 18 (ref 6–20)
CALCIUM SERPL-MCNC: 9.5 MG/DL (ref 8.5–10.1)
CHLORIDE SERPL-SCNC: 104 MMOL/L (ref 98–107)
CLARITY UR: CLEAR
CO2 SERPL-SCNC: 31 MMOL/L (ref 21–32)
COLOR UR: COLORLESS
CREAT SERPL-MCNC: 0.56 MG/DL (ref 0.55–1.02)
DIFFERENTIAL METHOD BLD: ABNORMAL
EGFR (NO RACE VARIABLE) (RUSH/TITUS): 103 ML/MIN/1.73M2
EOSINOPHIL # BLD AUTO: 0.09 K/UL (ref 0–0.5)
EOSINOPHIL NFR BLD AUTO: 0.8 % (ref 1–4)
ERYTHROCYTE [DISTWIDTH] IN BLOOD BY AUTOMATED COUNT: 13.3 % (ref 11.5–14.5)
EST. AVERAGE GLUCOSE BLD GHB EST-MCNC: 97 MG/DL
GLOBULIN SER-MCNC: 3.2 G/DL (ref 2–4)
GLUCOSE SERPL-MCNC: 81 MG/DL (ref 74–106)
GLUCOSE UR STRIP-MCNC: NORMAL MG/DL
HBA1C MFR BLD HPLC: 5.5 % (ref 4.5–6.6)
HCT VFR BLD AUTO: 43.2 % (ref 38–47)
HGB BLD-MCNC: 13.9 G/DL (ref 12–16)
IMM GRANULOCYTES # BLD AUTO: 0.03 K/UL (ref 0–0.04)
IMM GRANULOCYTES NFR BLD: 0.3 % (ref 0–0.4)
KETONES UR STRIP-SCNC: NEGATIVE MG/DL
LEUKOCYTE ESTERASE UR QL STRIP: NEGATIVE
LIPASE SERPL-CCNC: 70 U/L (ref 73–393)
LYMPHOCYTES # BLD AUTO: 4.23 K/UL (ref 1–4.8)
LYMPHOCYTES NFR BLD AUTO: 35.8 % (ref 27–41)
MCH RBC QN AUTO: 31.2 PG (ref 27–31)
MCHC RBC AUTO-ENTMCNC: 32.2 G/DL (ref 32–36)
MCV RBC AUTO: 96.9 FL (ref 80–96)
MONOCYTES # BLD AUTO: 0.8 K/UL (ref 0–0.8)
MONOCYTES NFR BLD AUTO: 6.8 % (ref 2–6)
MPC BLD CALC-MCNC: 9.1 FL (ref 9.4–12.4)
NEUTROPHILS # BLD AUTO: 6.59 K/UL (ref 1.8–7.7)
NEUTROPHILS NFR BLD AUTO: 55.6 % (ref 53–65)
NITRITE UR QL STRIP: NEGATIVE
NRBC # BLD AUTO: 0 X10E3/UL
NRBC, AUTO (.00): 0 %
PH UR STRIP: 5.5 PH UNITS
PLATELET # BLD AUTO: 380 K/UL (ref 150–400)
POTASSIUM SERPL-SCNC: 4.8 MMOL/L (ref 3.5–5.1)
PROT SERPL-MCNC: 7.5 G/DL (ref 6.4–8.2)
PROT UR QL STRIP: NEGATIVE
RBC # BLD AUTO: 4.46 M/UL (ref 4.2–5.4)
RBC # UR STRIP: NEGATIVE /UL
SODIUM SERPL-SCNC: 138 MMOL/L (ref 136–145)
SP GR UR STRIP: 1.01
UROBILINOGEN UR STRIP-ACNC: NORMAL MG/DL
WBC # BLD AUTO: 11.82 K/UL (ref 4.5–11)

## 2023-07-05 PROCEDURE — 99214 PR OFFICE/OUTPT VISIT, EST, LEVL IV, 30-39 MIN: ICD-10-PCS | Mod: ,,, | Performed by: FAMILY MEDICINE

## 2023-07-05 PROCEDURE — 85025 COMPLETE CBC W/AUTO DIFF WBC: CPT | Mod: ,,, | Performed by: CLINICAL MEDICAL LABORATORY

## 2023-07-05 PROCEDURE — 81003 URINALYSIS: ICD-10-PCS | Mod: QW,,, | Performed by: CLINICAL MEDICAL LABORATORY

## 2023-07-05 PROCEDURE — 3074F PR MOST RECENT SYSTOLIC BLOOD PRESSURE < 130 MM HG: ICD-10-PCS | Mod: ,,, | Performed by: FAMILY MEDICINE

## 2023-07-05 PROCEDURE — 87086 URINE CULTURE/COLONY COUNT: CPT | Mod: ,,, | Performed by: CLINICAL MEDICAL LABORATORY

## 2023-07-05 PROCEDURE — 83036 HEMOGLOBIN A1C: ICD-10-PCS | Mod: ,,, | Performed by: CLINICAL MEDICAL LABORATORY

## 2023-07-05 PROCEDURE — 3044F HG A1C LEVEL LT 7.0%: CPT | Mod: ,,, | Performed by: FAMILY MEDICINE

## 2023-07-05 PROCEDURE — 82150 AMYLASE: ICD-10-PCS | Mod: ,,, | Performed by: CLINICAL MEDICAL LABORATORY

## 2023-07-05 PROCEDURE — 80053 COMPREHEN METABOLIC PANEL: CPT | Mod: ,,, | Performed by: CLINICAL MEDICAL LABORATORY

## 2023-07-05 PROCEDURE — 99214 OFFICE O/P EST MOD 30 MIN: CPT | Mod: ,,, | Performed by: FAMILY MEDICINE

## 2023-07-05 PROCEDURE — 83690 LIPASE: ICD-10-PCS | Mod: ,,, | Performed by: CLINICAL MEDICAL LABORATORY

## 2023-07-05 PROCEDURE — 83690 ASSAY OF LIPASE: CPT | Mod: ,,, | Performed by: CLINICAL MEDICAL LABORATORY

## 2023-07-05 PROCEDURE — 85025 CBC WITH DIFFERENTIAL: ICD-10-PCS | Mod: ,,, | Performed by: CLINICAL MEDICAL LABORATORY

## 2023-07-05 PROCEDURE — 87086 CULTURE, URINE: ICD-10-PCS | Mod: ,,, | Performed by: CLINICAL MEDICAL LABORATORY

## 2023-07-05 PROCEDURE — 3078F DIAST BP <80 MM HG: CPT | Mod: ,,, | Performed by: FAMILY MEDICINE

## 2023-07-05 PROCEDURE — 3078F PR MOST RECENT DIASTOLIC BLOOD PRESSURE < 80 MM HG: ICD-10-PCS | Mod: ,,, | Performed by: FAMILY MEDICINE

## 2023-07-05 PROCEDURE — 81003 URINALYSIS AUTO W/O SCOPE: CPT | Mod: QW,,, | Performed by: CLINICAL MEDICAL LABORATORY

## 2023-07-05 PROCEDURE — 3008F BODY MASS INDEX DOCD: CPT | Mod: ,,, | Performed by: FAMILY MEDICINE

## 2023-07-05 PROCEDURE — 3074F SYST BP LT 130 MM HG: CPT | Mod: ,,, | Performed by: FAMILY MEDICINE

## 2023-07-05 PROCEDURE — 82306 VITAMIN D: ICD-10-PCS | Mod: ,,, | Performed by: CLINICAL MEDICAL LABORATORY

## 2023-07-05 PROCEDURE — 3044F PR MOST RECENT HEMOGLOBIN A1C LEVEL <7.0%: ICD-10-PCS | Mod: ,,, | Performed by: FAMILY MEDICINE

## 2023-07-05 PROCEDURE — 80053 COMPREHENSIVE METABOLIC PANEL: ICD-10-PCS | Mod: ,,, | Performed by: CLINICAL MEDICAL LABORATORY

## 2023-07-05 PROCEDURE — 3008F PR BODY MASS INDEX (BMI) DOCUMENTED: ICD-10-PCS | Mod: ,,, | Performed by: FAMILY MEDICINE

## 2023-07-05 PROCEDURE — 1159F PR MEDICATION LIST DOCUMENTED IN MEDICAL RECORD: ICD-10-PCS | Mod: ,,, | Performed by: FAMILY MEDICINE

## 2023-07-05 PROCEDURE — 82306 VITAMIN D 25 HYDROXY: CPT | Mod: ,,, | Performed by: CLINICAL MEDICAL LABORATORY

## 2023-07-05 PROCEDURE — 83036 HEMOGLOBIN GLYCOSYLATED A1C: CPT | Mod: ,,, | Performed by: CLINICAL MEDICAL LABORATORY

## 2023-07-05 PROCEDURE — 1159F MED LIST DOCD IN RCRD: CPT | Mod: ,,, | Performed by: FAMILY MEDICINE

## 2023-07-05 PROCEDURE — 82150 ASSAY OF AMYLASE: CPT | Mod: ,,, | Performed by: CLINICAL MEDICAL LABORATORY

## 2023-07-05 RX ORDER — QUETIAPINE FUMARATE 50 MG/1
50 TABLET, FILM COATED ORAL NIGHTLY
Qty: 60 TABLET | Refills: 11 | Status: SHIPPED | OUTPATIENT
Start: 2023-07-05

## 2023-07-05 RX ORDER — CIPROFLOXACIN 500 MG/1
500 TABLET ORAL 2 TIMES DAILY
Qty: 20 TABLET | Refills: 0 | Status: SHIPPED | OUTPATIENT
Start: 2023-07-05 | End: 2023-07-15

## 2023-07-05 RX ORDER — ALBUTEROL SULFATE 90 UG/1
2 AEROSOL, METERED RESPIRATORY (INHALATION) EVERY 4 HOURS PRN
Qty: 18 G | Refills: 11 | Status: SHIPPED | OUTPATIENT
Start: 2023-07-05

## 2023-07-07 LAB — UA COMPLETE W REFLEX CULTURE PNL UR: NORMAL

## 2023-07-10 DIAGNOSIS — R10.11 RUQ ABDOMINAL PAIN: Primary | ICD-10-CM

## 2023-07-10 DIAGNOSIS — R11.0 NAUSEA: ICD-10-CM

## 2023-07-20 ENCOUNTER — HOSPITAL ENCOUNTER (OUTPATIENT)
Dept: RADIOLOGY | Facility: HOSPITAL | Age: 63
Discharge: HOME | End: 2023-07-20
Attending: FAMILY MEDICINE
Payer: COMMERCIAL

## 2023-07-20 DIAGNOSIS — R11.0 NAUSEA: ICD-10-CM

## 2023-07-20 DIAGNOSIS — R10.11 RUQ ABDOMINAL PAIN: ICD-10-CM

## 2023-07-20 PROCEDURE — 78227 NM HEPATOBILIARY(HIDA) WITH PHARM AND EF WHEN PERFORMED: ICD-10-PCS | Mod: 26,,, | Performed by: RADIOLOGY

## 2023-07-20 PROCEDURE — 78227 HEPATOBIL SYST IMAGE W/DRUG: CPT | Mod: TC

## 2023-07-20 PROCEDURE — 78227 HEPATOBIL SYST IMAGE W/DRUG: CPT | Mod: 26,,, | Performed by: RADIOLOGY

## 2023-08-04 RX ORDER — MELOXICAM 7.5 MG/1
7.5 TABLET ORAL DAILY
Qty: 30 TABLET | Refills: 1 | Status: SHIPPED | OUTPATIENT
Start: 2023-08-04 | End: 2023-09-21

## 2023-08-07 PROBLEM — E55.9 VITAMIN D DEFICIENCY: Status: ACTIVE | Noted: 2023-08-07

## 2023-08-07 PROBLEM — F32.A DEPRESSION: Status: ACTIVE | Noted: 2023-08-07

## 2023-08-07 PROBLEM — G47.00 INSOMNIA: Chronic | Status: ACTIVE | Noted: 2023-08-07

## 2023-08-07 PROBLEM — F31.9 BIPOLAR DEPRESSION: Status: ACTIVE | Noted: 2023-08-07

## 2023-08-07 PROBLEM — R50.9 FEVER: Status: ACTIVE | Noted: 2023-08-07

## 2023-08-07 NOTE — PROGRESS NOTES
Subjective     Patient ID: Neyda Claire is a 62 y.o. female.    Chief Complaint: Establish Care, Flank Pain, urine frequency, Set up colonoscopy, Check Gallblader , Back Pain, Neck Injury, Shortness of Breath, Cough, Insomnia, Depression, no appitite , Weight Loss, No attention span, and Mucus in stool    61 yo WF new patient here with a multitude of complaints. Fever, COPD, left flank pain, probable bipolar depression, right inguinal bulge, fatigue etc.     Flank Pain  Pertinent negatives include no abdominal pain, chest pain or headaches.   Back Pain  Pertinent negatives include no abdominal pain, chest pain or headaches.   Neck Injury   Pertinent negatives include no chest pain or headaches.   Shortness of Breath  Pertinent negatives include no abdominal pain, chest pain or headaches.   Cough  Associated symptoms include shortness of breath. Pertinent negatives include no chest pain or headaches.   DepressionPatient presents with the following symptoms: shortness of breath.        Review of Systems   Constitutional:  Positive for fatigue. Negative for activity change.   Eyes:  Negative for visual disturbance.   Respiratory:  Positive for cough and shortness of breath.    Cardiovascular:  Negative for chest pain.   Gastrointestinal:  Positive for blood in stool. Negative for abdominal pain.   Genitourinary:  Positive for flank pain.   Musculoskeletal:  Positive for back pain.   Neurological:  Negative for headaches.   Psychiatric/Behavioral:  Positive for depression and dysphoric mood.      Office Visit on 07/05/2023   Component Date Value Ref Range Status    Color, UA 07/05/2023 Colorless  Colorless, Straw, Yellow, Light Yellow, Dark Yellow Final    Clarity, UA 07/05/2023 Clear  Clear Final    pH, UA 07/05/2023 5.5  5.0 to 8.0 pH Units Final    Leukocytes, UA 07/05/2023 Negative  Negative Final    Nitrites, UA 07/05/2023 Negative  Negative Final    Protein, UA 07/05/2023 Negative  Negative Final    Glucose, UA  07/05/2023 Normal  Normal mg/dL Final    Ketones, UA 07/05/2023 Negative  Negative mg/dL Final    Urobilinogen, UA 07/05/2023 Normal  0.2, 1.0, Normal mg/dL Final    Bilirubin, UA 07/05/2023 Negative  Negative Final    Blood, UA 07/05/2023 Negative  Negative Final    Specific Gravity, UA 07/05/2023 1.008  <=1.030 Final    Culture, Urine 07/05/2023 Skin/Urogenital Nara Isolated, no further workup.   Final    Hemoglobin A1C 07/05/2023 5.5  4.5 - 6.6 % Final      Normal:               <5.7%  Pre-Diabetic:       5.7% to 6.4%  Diabetic:             >6.4%  Diabetic Goal:     <7%    Estimated Average Glucose 07/05/2023 97  mg/dL Final    Sodium 07/05/2023 138  136 - 145 mmol/L Final    Potassium 07/05/2023 4.8  3.5 - 5.1 mmol/L Final    Chloride 07/05/2023 104  98 - 107 mmol/L Final    CO2 07/05/2023 31  21 - 32 mmol/L Final    Anion Gap 07/05/2023 8  7 - 16 mmol/L Final    Glucose 07/05/2023 81  74 - 106 mg/dL Final    BUN 07/05/2023 10  7 - 18 mg/dL Final    Creatinine 07/05/2023 0.56  0.55 - 1.02 mg/dL Final    BUN/Creatinine Ratio 07/05/2023 18  6 - 20 Final    Calcium 07/05/2023 9.5  8.5 - 10.1 mg/dL Final    Total Protein 07/05/2023 7.5  6.4 - 8.2 g/dL Final    Albumin 07/05/2023 4.3  3.5 - 5.0 g/dL Final    Globulin 07/05/2023 3.2  2.0 - 4.0 g/dL Final    A/G Ratio 07/05/2023 1.3   Final    Bilirubin, Total 07/05/2023 0.3  >0.0 - 1.2 mg/dL Final    Alk Phos 07/05/2023 65  50 - 130 U/L Final    ALT 07/05/2023 29  13 - 56 U/L Final    AST 07/05/2023 24  15 - 37 U/L Final    eGFR 07/05/2023 103  >=60 mL/min/1.73m2 Final    Vitamin D 25-Hydroxy, Blood 07/05/2023 212.5  ng/mL Final    Vitamin D 25-OH Adult Reference Values:  Deficiency: <20 ng/mL  Insufficiency: 20 - <30 ng/mL  Sufficiency: 30 -100 ng/mL    Vitamin D 25-OH Pediatric Reference Values:  Deficiency: <15 ng/mL  Insufficiency: 15 - <20 ng/mL  Sufficiency: 20 - 100 ng/mL    Amylase 07/05/2023 36  25 - 115 U/L Final    Lipase 07/05/2023 70 (L)  73 - 393 U/L  "Final    WBC 07/05/2023 11.82 (H)  4.50 - 11.00 K/uL Final    RBC 07/05/2023 4.46  4.20 - 5.40 M/uL Final    Hemoglobin 07/05/2023 13.9  12.0 - 16.0 g/dL Final    Hematocrit 07/05/2023 43.2  38.0 - 47.0 % Final    MCV 07/05/2023 96.9 (H)  80.0 - 96.0 fL Final    MCH 07/05/2023 31.2 (H)  27.0 - 31.0 pg Final    MCHC 07/05/2023 32.2  32.0 - 36.0 g/dL Final    RDW 07/05/2023 13.3  11.5 - 14.5 % Final    Platelet Count 07/05/2023 380  150 - 400 K/uL Final    MPV 07/05/2023 9.1 (L)  9.4 - 12.4 fL Final    Neutrophils % 07/05/2023 55.6  53.0 - 65.0 % Final    Lymphocytes % 07/05/2023 35.8  27.0 - 41.0 % Final    Monocytes % 07/05/2023 6.8 (H)  2.0 - 6.0 % Final    Eosinophils % 07/05/2023 0.8 (L)  1.0 - 4.0 % Final    Basophils % 07/05/2023 0.7  0.0 - 1.0 % Final    Immature Granulocytes % 07/05/2023 0.3  0.0 - 0.4 % Final    nRBC, Auto 07/05/2023 0.0  <=0.0 % Final    Neutrophils, Abs 07/05/2023 6.59  1.80 - 7.70 K/uL Final    Lymphocytes, Absolute 07/05/2023 4.23  1.00 - 4.80 K/uL Final    Monocytes, Absolute 07/05/2023 0.80  0.00 - 0.80 K/uL Final    Eosinophils, Absolute 07/05/2023 0.09  0.00 - 0.50 K/uL Final    Basophils, Absolute 07/05/2023 0.08  0.00 - 0.20 K/uL Final    Immature Granulocytes, Absolute 07/05/2023 0.03  0.00 - 0.04 K/uL Final    nRBC, Absolute 07/05/2023 0.00  <=0.00 x10e3/uL Final    Diff Type 07/05/2023 Auto   Final          Objective   Blood pressure 124/79, pulse 78, temperature 98.6 °F (37 °C), temperature source Oral, resp. rate 18, height 5' 8" (1.727 m), weight 39 kg (86 lb), SpO2 97 %.    Physical Exam  Constitutional:       Appearance: Normal appearance.   HENT:      Head: Normocephalic and atraumatic.      Right Ear: External ear normal.      Left Ear: External ear normal.      Nose: Nose normal.      Mouth/Throat:      Mouth: Mucous membranes are moist.   Eyes:      Conjunctiva/sclera: Conjunctivae normal.      Pupils: Pupils are equal, round, and reactive to light.   Cardiovascular:    "   Rate and Rhythm: Normal rate and regular rhythm.      Pulses: Normal pulses.      Heart sounds: Normal heart sounds.   Pulmonary:      Effort: Pulmonary effort is normal.      Breath sounds: Normal breath sounds.   Abdominal:      General: Abdomen is flat.      Palpations: Abdomen is soft.   Musculoskeletal:         General: Normal range of motion.      Cervical back: Normal range of motion and neck supple.   Skin:     General: Skin is warm and dry.   Neurological:      General: No focal deficit present.      Mental Status: She is alert and oriented to person, place, and time.   Psychiatric:         Mood and Affect: Mood normal.         Behavior: Behavior normal.         Thought Content: Thought content normal.         Judgment: Judgment normal.            Assessment and Plan     1. Fever, unspecified fever cause    2. Flank pain  -     Urinalysis; Future; Expected date: 07/05/2023  -     Urine culture; Future; Expected date: 07/05/2023    3. Urine frequency  -     Urinalysis; Future; Expected date: 07/05/2023  -     Urine culture; Future; Expected date: 07/05/2023    4. Acute pyelonephritis  -     ciprofloxacin HCl (CIPRO) 500 MG tablet; Take 1 tablet (500 mg total) by mouth 2 (two) times daily. for 10 days  Dispense: 20 tablet; Refill: 0    5. Acute bronchitis, unspecified organism  -     albuterol (VENTOLIN HFA) 90 mcg/actuation inhaler; Inhale 2 puffs into the lungs every 4 (four) hours as needed for Wheezing or Shortness of Breath. Rescue  Dispense: 18 g; Refill: 11    6. Weight loss  -     Ambulatory referral/consult to Gastroenterology; Future; Expected date: 07/12/2023  -     Ambulatory referral/consult to General Surgery; Future; Expected date: 07/12/2023  -     Hemoglobin A1C; Future; Expected date: 07/05/2023  -     CBC Auto Differential; Future; Expected date: 07/05/2023  -     Comprehensive Metabolic Panel; Future; Expected date: 07/05/2023  -     Amylase; Future; Expected date: 07/05/2023  -      Lipase; Future; Expected date: 07/05/2023    7. Contact with and (suspected) exposure to covid-19    8. RUQ abdominal pain  -     Ambulatory referral/consult to Gastroenterology; Future; Expected date: 07/12/2023  -     NM Hepatobiliary Scan (HIDA); Future; Expected date: 07/05/2023  -     Ambulatory referral/consult to General Surgery; Future; Expected date: 07/12/2023    9. Dysphagia, unspecified type  -     Ambulatory referral/consult to Gastroenterology; Future; Expected date: 07/12/2023    10. Epigastric pain  -     Hemoglobin A1C; Future; Expected date: 07/05/2023  -     CBC Auto Differential; Future; Expected date: 07/05/2023  -     Comprehensive Metabolic Panel; Future; Expected date: 07/05/2023  -     Amylase; Future; Expected date: 07/05/2023  -     Lipase; Future; Expected date: 07/05/2023    11. Vitamin D deficiency  -     Vitamin D; Future; Expected date: 07/05/2023    12. Nausea  -     Hemoglobin A1C; Future; Expected date: 07/05/2023  -     CBC Auto Differential; Future; Expected date: 07/05/2023  -     Comprehensive Metabolic Panel; Future; Expected date: 07/05/2023  -     Amylase; Future; Expected date: 07/05/2023  -     Lipase; Future; Expected date: 07/05/2023    13. Insomnia, unspecified type  -     QUEtiapine (SEROQUEL) 50 MG tablet; Take 1 tablet (50 mg total) by mouth every evening. And may increase to 2 tablets in the evening.  Dispense: 60 tablet; Refill: 11    14. Depression, unspecified depression type  -     QUEtiapine (SEROQUEL) 50 MG tablet; Take 1 tablet (50 mg total) by mouth every evening. And may increase to 2 tablets in the evening.  Dispense: 60 tablet; Refill: 11    15. Bipolar depression        RTC prn. Will need to follow up to see what to do next. Sample of trelegy and breztri given.          Follow up if symptoms worsen or fail to improve.

## 2023-09-21 RX ORDER — MELOXICAM 7.5 MG/1
7.5 TABLET ORAL
Qty: 30 TABLET | Refills: 0 | Status: SHIPPED | OUTPATIENT
Start: 2023-09-21 | End: 2023-11-15 | Stop reason: SDUPTHER

## 2023-11-15 ENCOUNTER — PATIENT MESSAGE (OUTPATIENT)
Dept: ORTHOPEDICS | Facility: CLINIC | Age: 63
End: 2023-11-15
Payer: COMMERCIAL

## 2023-11-15 ENCOUNTER — PATIENT MESSAGE (OUTPATIENT)
Dept: ADMINISTRATIVE | Facility: HOSPITAL | Age: 63
End: 2023-11-15

## 2023-11-15 RX ORDER — MELOXICAM 7.5 MG/1
7.5 TABLET ORAL DAILY
Qty: 30 TABLET | Refills: 2 | Status: SHIPPED | OUTPATIENT
Start: 2023-11-15

## 2023-11-20 ENCOUNTER — PATIENT OUTREACH (OUTPATIENT)
Dept: ADMINISTRATIVE | Facility: HOSPITAL | Age: 63
End: 2023-11-20

## 2023-11-20 ENCOUNTER — PATIENT OUTREACH (OUTPATIENT)
Dept: FAMILY MEDICINE | Facility: CLINIC | Age: 63
End: 2023-11-20
Payer: COMMERCIAL

## 2023-11-20 DIAGNOSIS — Z12.11 SCREENING FOR COLON CANCER: Primary | ICD-10-CM

## 2023-11-20 DIAGNOSIS — Z12.11 COLON CANCER SCREENING: ICD-10-CM

## 2023-11-20 DIAGNOSIS — Z12.39 ENCOUNTER FOR SCREENING FOR MALIGNANT NEOPLASM OF BREAST, UNSPECIFIED SCREENING MODALITY: Primary | ICD-10-CM

## 2023-11-20 NOTE — PROGRESS NOTES
Population Health Chart Review & Patient Outreach Details    Campaign Outreach    Updates Requested / Reviewed:  [x]  Care Team Updated    Health Maintenance Topics Addressed and Outreach Outcomes / Actions Taken:           Breast Cancer Screening [x]  Mammogram Ordered.                                         Colorectal Cancer Screening [x]  Colonoscopy Ordered.

## 2023-11-22 RX ORDER — POLYETHYLENE GLYCOL 3350, SODIUM SULFATE ANHYDROUS, SODIUM BICARBONATE, SODIUM CHLORIDE, POTASSIUM CHLORIDE 236; 22.74; 6.74; 5.86; 2.97 G/4L; G/4L; G/4L; G/4L; G/4L
4 POWDER, FOR SOLUTION ORAL ONCE
Qty: 4000 ML | Refills: 0 | Status: SHIPPED | OUTPATIENT
Start: 2023-11-22 | End: 2023-11-22

## 2023-12-13 ENCOUNTER — OFFICE VISIT (OUTPATIENT)
Dept: SURGERY | Facility: CLINIC | Age: 63
End: 2023-12-13
Payer: COMMERCIAL

## 2023-12-13 ENCOUNTER — TELEPHONE (OUTPATIENT)
Dept: EMERGENCY MEDICINE | Facility: HOSPITAL | Age: 63
End: 2023-12-13
Payer: COMMERCIAL

## 2023-12-13 ENCOUNTER — HOSPITAL ENCOUNTER (INPATIENT)
Facility: HOSPITAL | Age: 63
LOS: 10 days | Discharge: HOME HEALTH | DRG: 329 | End: 2023-12-23
Attending: STUDENT IN AN ORGANIZED HEALTH CARE EDUCATION/TRAINING PROGRAM | Admitting: STUDENT IN AN ORGANIZED HEALTH CARE EDUCATION/TRAINING PROGRAM
Payer: COMMERCIAL

## 2023-12-13 ENCOUNTER — TELEPHONE (OUTPATIENT)
Dept: SURGERY | Facility: CLINIC | Age: 63
End: 2023-12-13
Payer: COMMERCIAL

## 2023-12-13 ENCOUNTER — ANESTHESIA EVENT (OUTPATIENT)
Dept: SURGERY | Facility: HOSPITAL | Age: 63
DRG: 329 | End: 2023-12-13
Payer: COMMERCIAL

## 2023-12-13 ENCOUNTER — ANESTHESIA (OUTPATIENT)
Dept: SURGERY | Facility: HOSPITAL | Age: 63
DRG: 329 | End: 2023-12-13
Payer: COMMERCIAL

## 2023-12-13 DIAGNOSIS — R10.13 EPIGASTRIC PAIN: ICD-10-CM

## 2023-12-13 DIAGNOSIS — K82.8 GALLBLADDER SLUDGE: ICD-10-CM

## 2023-12-13 DIAGNOSIS — K40.30 INCARCERATED INGUINAL HERNIA: ICD-10-CM

## 2023-12-13 DIAGNOSIS — K40.30 INCARCERATED INGUINAL HERNIA: Primary | ICD-10-CM

## 2023-12-13 DIAGNOSIS — R10.9 ABDOMINAL PAIN, UNSPECIFIED ABDOMINAL LOCATION: ICD-10-CM

## 2023-12-13 PROCEDURE — 25000003 PHARM REV CODE 250: Performed by: NURSE ANESTHETIST, CERTIFIED REGISTERED

## 2023-12-13 PROCEDURE — 44120 REMOVAL OF SMALL INTESTINE: CPT | Mod: 51,,, | Performed by: STUDENT IN AN ORGANIZED HEALTH CARE EDUCATION/TRAINING PROGRAM

## 2023-12-13 PROCEDURE — 0DTJ0ZZ RESECTION OF APPENDIX, OPEN APPROACH: ICD-10-PCS | Performed by: STUDENT IN AN ORGANIZED HEALTH CARE EDUCATION/TRAINING PROGRAM

## 2023-12-13 PROCEDURE — 88304 TISSUE EXAM BY PATHOLOGIST: CPT | Mod: 26,59,, | Performed by: PATHOLOGY

## 2023-12-13 PROCEDURE — D9220A PRA ANESTHESIA: Mod: ANES,,, | Performed by: ANESTHESIOLOGY

## 2023-12-13 PROCEDURE — 71000033 HC RECOVERY, INTIAL HOUR: Performed by: STUDENT IN AN ORGANIZED HEALTH CARE EDUCATION/TRAINING PROGRAM

## 2023-12-13 PROCEDURE — 63600175 PHARM REV CODE 636 W HCPCS: Performed by: STUDENT IN AN ORGANIZED HEALTH CARE EDUCATION/TRAINING PROGRAM

## 2023-12-13 PROCEDURE — 27000655: Performed by: ANESTHESIOLOGY

## 2023-12-13 PROCEDURE — 0YJ74ZZ INSPECTION OF RIGHT FEMORAL REGION, PERCUTANEOUS ENDOSCOPIC APPROACH: ICD-10-PCS | Performed by: STUDENT IN AN ORGANIZED HEALTH CARE EDUCATION/TRAINING PROGRAM

## 2023-12-13 PROCEDURE — 99204 OFFICE O/P NEW MOD 45 MIN: CPT | Mod: 57,S$PBB,, | Performed by: STUDENT IN AN ORGANIZED HEALTH CARE EDUCATION/TRAINING PROGRAM

## 2023-12-13 PROCEDURE — 27000510 HC BLANKET BAIR HUGGER ANY SIZE: Performed by: ANESTHESIOLOGY

## 2023-12-13 PROCEDURE — 27000509 HC TUBE SALEM SUMP ANY SIZE: Performed by: ANESTHESIOLOGY

## 2023-12-13 PROCEDURE — 74300 X-RAY BILE DUCTS/PANCREAS: CPT | Mod: 26,,, | Performed by: STUDENT IN AN ORGANIZED HEALTH CARE EDUCATION/TRAINING PROGRAM

## 2023-12-13 PROCEDURE — 0D9670Z DRAINAGE OF STOMACH WITH DRAINAGE DEVICE, VIA NATURAL OR ARTIFICIAL OPENING: ICD-10-PCS | Performed by: STUDENT IN AN ORGANIZED HEALTH CARE EDUCATION/TRAINING PROGRAM

## 2023-12-13 PROCEDURE — 3044F PR MOST RECENT HEMOGLOBIN A1C LEVEL <7.0%: ICD-10-PCS | Mod: ,,, | Performed by: STUDENT IN AN ORGANIZED HEALTH CARE EDUCATION/TRAINING PROGRAM

## 2023-12-13 PROCEDURE — 27000165 HC TUBE, ETT CUFFED: Performed by: ANESTHESIOLOGY

## 2023-12-13 PROCEDURE — 1159F MED LIST DOCD IN RCRD: CPT | Mod: ,,, | Performed by: STUDENT IN AN ORGANIZED HEALTH CARE EDUCATION/TRAINING PROGRAM

## 2023-12-13 PROCEDURE — 94640 AIRWAY INHALATION TREATMENT: CPT

## 2023-12-13 PROCEDURE — 99214 OFFICE O/P EST MOD 30 MIN: CPT | Mod: PBBFAC | Performed by: STUDENT IN AN ORGANIZED HEALTH CARE EDUCATION/TRAINING PROGRAM

## 2023-12-13 PROCEDURE — 3044F HG A1C LEVEL LT 7.0%: CPT | Mod: ,,, | Performed by: STUDENT IN AN ORGANIZED HEALTH CARE EDUCATION/TRAINING PROGRAM

## 2023-12-13 PROCEDURE — 88307 TISSUE EXAM BY PATHOLOGIST: CPT | Mod: TC,SUR | Performed by: STUDENT IN AN ORGANIZED HEALTH CARE EDUCATION/TRAINING PROGRAM

## 2023-12-13 PROCEDURE — D9220A PRA ANESTHESIA: Mod: CRNA,,, | Performed by: NURSE ANESTHETIST, CERTIFIED REGISTERED

## 2023-12-13 PROCEDURE — 37000009 HC ANESTHESIA EA ADD 15 MINS: Performed by: STUDENT IN AN ORGANIZED HEALTH CARE EDUCATION/TRAINING PROGRAM

## 2023-12-13 PROCEDURE — 99204 PR OFFICE/OUTPT VISIT, NEW, LEVL IV, 45-59 MIN: ICD-10-PCS | Mod: 57,S$PBB,, | Performed by: STUDENT IN AN ORGANIZED HEALTH CARE EDUCATION/TRAINING PROGRAM

## 2023-12-13 PROCEDURE — 25000003 PHARM REV CODE 250: Performed by: STUDENT IN AN ORGANIZED HEALTH CARE EDUCATION/TRAINING PROGRAM

## 2023-12-13 PROCEDURE — C1729 CATH, DRAINAGE: HCPCS | Performed by: STUDENT IN AN ORGANIZED HEALTH CARE EDUCATION/TRAINING PROGRAM

## 2023-12-13 PROCEDURE — 37000008 HC ANESTHESIA 1ST 15 MINUTES: Performed by: STUDENT IN AN ORGANIZED HEALTH CARE EDUCATION/TRAINING PROGRAM

## 2023-12-13 PROCEDURE — 44955 APPENDECTOMY ADD-ON: CPT | Mod: 59,,, | Performed by: STUDENT IN AN ORGANIZED HEALTH CARE EDUCATION/TRAINING PROGRAM

## 2023-12-13 PROCEDURE — 1159F PR MEDICATION LIST DOCUMENTED IN MEDICAL RECORD: ICD-10-PCS | Mod: ,,, | Performed by: STUDENT IN AN ORGANIZED HEALTH CARE EDUCATION/TRAINING PROGRAM

## 2023-12-13 PROCEDURE — 63600175 PHARM REV CODE 636 W HCPCS: Performed by: NURSE ANESTHETIST, CERTIFIED REGISTERED

## 2023-12-13 PROCEDURE — 11000001 HC ACUTE MED/SURG PRIVATE ROOM

## 2023-12-13 PROCEDURE — 0FT40ZZ RESECTION OF GALLBLADDER, OPEN APPROACH: ICD-10-PCS | Performed by: STUDENT IN AN ORGANIZED HEALTH CARE EDUCATION/TRAINING PROGRAM

## 2023-12-13 PROCEDURE — 0DNB0ZZ RELEASE ILEUM, OPEN APPROACH: ICD-10-PCS | Performed by: STUDENT IN AN ORGANIZED HEALTH CARE EDUCATION/TRAINING PROGRAM

## 2023-12-13 PROCEDURE — D9220A PRA ANESTHESIA: ICD-10-PCS | Mod: ANES,,, | Performed by: ANESTHESIOLOGY

## 2023-12-13 PROCEDURE — 0DBB0ZZ EXCISION OF ILEUM, OPEN APPROACH: ICD-10-PCS | Performed by: STUDENT IN AN ORGANIZED HEALTH CARE EDUCATION/TRAINING PROGRAM

## 2023-12-13 PROCEDURE — 36000709 HC OR TIME LEV III EA ADD 15 MIN: Performed by: STUDENT IN AN ORGANIZED HEALTH CARE EDUCATION/TRAINING PROGRAM

## 2023-12-13 PROCEDURE — 25000242 PHARM REV CODE 250 ALT 637 W/ HCPCS: Performed by: STUDENT IN AN ORGANIZED HEALTH CARE EDUCATION/TRAINING PROGRAM

## 2023-12-13 PROCEDURE — 27201423 OPTIME MED/SURG SUP & DEVICES STERILE SUPPLY: Performed by: STUDENT IN AN ORGANIZED HEALTH CARE EDUCATION/TRAINING PROGRAM

## 2023-12-13 PROCEDURE — 0F790ZZ DILATION OF COMMON BILE DUCT, OPEN APPROACH: ICD-10-PCS | Performed by: STUDENT IN AN ORGANIZED HEALTH CARE EDUCATION/TRAINING PROGRAM

## 2023-12-13 PROCEDURE — 88307 TISSUE EXAM BY PATHOLOGIST: CPT | Mod: 26,,, | Performed by: PATHOLOGY

## 2023-12-13 PROCEDURE — 99900035 HC TECH TIME PER 15 MIN (STAT)

## 2023-12-13 PROCEDURE — 94761 N-INVAS EAR/PLS OXIMETRY MLT: CPT

## 2023-12-13 PROCEDURE — 0YQ70ZZ REPAIR RIGHT FEMORAL REGION, OPEN APPROACH: ICD-10-PCS | Performed by: STUDENT IN AN ORGANIZED HEALTH CARE EDUCATION/TRAINING PROGRAM

## 2023-12-13 PROCEDURE — 47610 REMOVAL OF GALLBLADDER: CPT | Mod: 22,,, | Performed by: STUDENT IN AN ORGANIZED HEALTH CARE EDUCATION/TRAINING PROGRAM

## 2023-12-13 PROCEDURE — C1887 CATHETER, GUIDING: HCPCS | Performed by: STUDENT IN AN ORGANIZED HEALTH CARE EDUCATION/TRAINING PROGRAM

## 2023-12-13 PROCEDURE — D9220A PRA ANESTHESIA: ICD-10-PCS | Mod: CRNA,,, | Performed by: NURSE ANESTHETIST, CERTIFIED REGISTERED

## 2023-12-13 PROCEDURE — 27000689 HC BLADE LARYNGOSCOPE ANY SIZE: Performed by: ANESTHESIOLOGY

## 2023-12-13 PROCEDURE — 27000221 HC OXYGEN, UP TO 24 HOURS

## 2023-12-13 PROCEDURE — 27000716 HC OXISENSOR PROBE, ANY SIZE: Performed by: ANESTHESIOLOGY

## 2023-12-13 PROCEDURE — 36000708 HC OR TIME LEV III 1ST 15 MIN: Performed by: STUDENT IN AN ORGANIZED HEALTH CARE EDUCATION/TRAINING PROGRAM

## 2023-12-13 RX ORDER — ACETAMINOPHEN 325 MG/1
650 TABLET ORAL EVERY 8 HOURS PRN
Status: DISCONTINUED | OUTPATIENT
Start: 2023-12-13 | End: 2023-12-23 | Stop reason: HOSPADM

## 2023-12-13 RX ORDER — SODIUM CHLORIDE 9 MG/ML
INJECTION, SOLUTION INTRAVENOUS CONTINUOUS
Status: CANCELLED | OUTPATIENT
Start: 2023-12-13

## 2023-12-13 RX ORDER — TALC
6 POWDER (GRAM) TOPICAL NIGHTLY PRN
Status: DISCONTINUED | OUTPATIENT
Start: 2023-12-13 | End: 2023-12-23 | Stop reason: HOSPADM

## 2023-12-13 RX ORDER — METHOCARBAMOL 500 MG/1
1000 TABLET, FILM COATED ORAL 3 TIMES DAILY
Status: DISCONTINUED | OUTPATIENT
Start: 2023-12-13 | End: 2023-12-23 | Stop reason: HOSPADM

## 2023-12-13 RX ORDER — DIPHENHYDRAMINE HYDROCHLORIDE 50 MG/ML
25 INJECTION, SOLUTION INTRAMUSCULAR; INTRAVENOUS EVERY 6 HOURS PRN
Status: DISCONTINUED | OUTPATIENT
Start: 2023-12-13 | End: 2023-12-13 | Stop reason: HOSPADM

## 2023-12-13 RX ORDER — SODIUM CHLORIDE 0.9 % (FLUSH) 0.9 %
10 SYRINGE (ML) INJECTION
Status: DISCONTINUED | OUTPATIENT
Start: 2023-12-13 | End: 2023-12-23 | Stop reason: HOSPADM

## 2023-12-13 RX ORDER — ONDANSETRON 2 MG/ML
4 INJECTION INTRAMUSCULAR; INTRAVENOUS DAILY PRN
Status: DISCONTINUED | OUTPATIENT
Start: 2023-12-13 | End: 2023-12-13 | Stop reason: HOSPADM

## 2023-12-13 RX ORDER — MORPHINE SULFATE 10 MG/ML
4 INJECTION INTRAMUSCULAR; INTRAVENOUS; SUBCUTANEOUS EVERY 5 MIN PRN
Status: DISCONTINUED | OUTPATIENT
Start: 2023-12-13 | End: 2023-12-13 | Stop reason: HOSPADM

## 2023-12-13 RX ORDER — PROCHLORPERAZINE EDISYLATE 5 MG/ML
5 INJECTION INTRAMUSCULAR; INTRAVENOUS EVERY 6 HOURS PRN
Status: DISCONTINUED | OUTPATIENT
Start: 2023-12-13 | End: 2023-12-23 | Stop reason: HOSPADM

## 2023-12-13 RX ORDER — KETOROLAC TROMETHAMINE 30 MG/ML
15 INJECTION, SOLUTION INTRAMUSCULAR; INTRAVENOUS EVERY 6 HOURS
Status: DISCONTINUED | OUTPATIENT
Start: 2023-12-13 | End: 2023-12-16 | Stop reason: RX

## 2023-12-13 RX ORDER — SODIUM CHLORIDE 9 MG/ML
INJECTION, SOLUTION INTRAVENOUS CONTINUOUS
Status: DISCONTINUED | OUTPATIENT
Start: 2023-12-13 | End: 2023-12-13

## 2023-12-13 RX ORDER — HYDROMORPHONE HYDROCHLORIDE 2 MG/ML
0.5 INJECTION, SOLUTION INTRAMUSCULAR; INTRAVENOUS; SUBCUTANEOUS EVERY 5 MIN PRN
Status: DISCONTINUED | OUTPATIENT
Start: 2023-12-13 | End: 2023-12-13 | Stop reason: HOSPADM

## 2023-12-13 RX ORDER — MIDAZOLAM HYDROCHLORIDE 1 MG/ML
INJECTION INTRAMUSCULAR; INTRAVENOUS
Status: DISCONTINUED | OUTPATIENT
Start: 2023-12-13 | End: 2023-12-13

## 2023-12-13 RX ORDER — CEFAZOLIN SODIUM 1 G/3ML
INJECTION, POWDER, FOR SOLUTION INTRAMUSCULAR; INTRAVENOUS
Status: DISCONTINUED | OUTPATIENT
Start: 2023-12-13 | End: 2023-12-13

## 2023-12-13 RX ORDER — PROPOFOL 10 MG/ML
VIAL (ML) INTRAVENOUS
Status: DISCONTINUED | OUTPATIENT
Start: 2023-12-13 | End: 2023-12-13

## 2023-12-13 RX ORDER — ONDANSETRON 4 MG/1
8 TABLET, ORALLY DISINTEGRATING ORAL EVERY 8 HOURS PRN
Status: DISCONTINUED | OUTPATIENT
Start: 2023-12-13 | End: 2023-12-23 | Stop reason: HOSPADM

## 2023-12-13 RX ORDER — SODIUM CHLORIDE, SODIUM LACTATE, POTASSIUM CHLORIDE, CALCIUM CHLORIDE 600; 310; 30; 20 MG/100ML; MG/100ML; MG/100ML; MG/100ML
125 INJECTION, SOLUTION INTRAVENOUS CONTINUOUS
Status: DISCONTINUED | OUTPATIENT
Start: 2023-12-13 | End: 2023-12-13

## 2023-12-13 RX ORDER — PHENYLEPHRINE HYDROCHLORIDE 10 MG/ML
INJECTION INTRAVENOUS
Status: DISCONTINUED | OUTPATIENT
Start: 2023-12-13 | End: 2023-12-13

## 2023-12-13 RX ORDER — ONDANSETRON 2 MG/ML
INJECTION INTRAMUSCULAR; INTRAVENOUS
Status: DISCONTINUED | OUTPATIENT
Start: 2023-12-13 | End: 2023-12-13

## 2023-12-13 RX ORDER — OXYCODONE AND ACETAMINOPHEN 5; 325 MG/1; MG/1
1 TABLET ORAL EVERY 6 HOURS PRN
Status: DISCONTINUED | OUTPATIENT
Start: 2023-12-13 | End: 2023-12-23 | Stop reason: HOSPADM

## 2023-12-13 RX ORDER — QUETIAPINE FUMARATE 25 MG/1
50 TABLET, FILM COATED ORAL NIGHTLY
Status: DISCONTINUED | OUTPATIENT
Start: 2023-12-13 | End: 2023-12-23 | Stop reason: HOSPADM

## 2023-12-13 RX ORDER — CEFAZOLIN SODIUM 2 G/50ML
2 SOLUTION INTRAVENOUS
Status: CANCELLED | OUTPATIENT
Start: 2023-12-13

## 2023-12-13 RX ORDER — LIDOCAINE HYDROCHLORIDE 20 MG/ML
INJECTION, SOLUTION EPIDURAL; INFILTRATION; INTRACAUDAL; PERINEURAL
Status: DISCONTINUED | OUTPATIENT
Start: 2023-12-13 | End: 2023-12-13

## 2023-12-13 RX ORDER — ONDANSETRON 2 MG/ML
4 INJECTION INTRAMUSCULAR; INTRAVENOUS EVERY 6 HOURS PRN
Status: DISCONTINUED | OUTPATIENT
Start: 2023-12-13 | End: 2023-12-23 | Stop reason: HOSPADM

## 2023-12-13 RX ORDER — SODIUM CHLORIDE, SODIUM LACTATE, POTASSIUM CHLORIDE, CALCIUM CHLORIDE 600; 310; 30; 20 MG/100ML; MG/100ML; MG/100ML; MG/100ML
INJECTION, SOLUTION INTRAVENOUS CONTINUOUS
Status: DISCONTINUED | OUTPATIENT
Start: 2023-12-13 | End: 2023-12-15

## 2023-12-13 RX ORDER — INDOCYANINE GREEN AND WATER 25 MG
6.25 KIT INJECTION ONCE
Status: COMPLETED | OUTPATIENT
Start: 2023-12-13 | End: 2023-12-13

## 2023-12-13 RX ORDER — FENTANYL CITRATE 50 UG/ML
INJECTION, SOLUTION INTRAMUSCULAR; INTRAVENOUS
Status: DISCONTINUED | OUTPATIENT
Start: 2023-12-13 | End: 2023-12-13

## 2023-12-13 RX ORDER — ENOXAPARIN SODIUM 100 MG/ML
40 INJECTION SUBCUTANEOUS EVERY 24 HOURS
Status: DISCONTINUED | OUTPATIENT
Start: 2023-12-13 | End: 2023-12-23 | Stop reason: HOSPADM

## 2023-12-13 RX ORDER — INDOCYANINE GREEN AND WATER 25 MG
6.25 KIT INJECTION ONCE
Status: CANCELLED | OUTPATIENT
Start: 2023-12-13

## 2023-12-13 RX ORDER — OXYCODONE HYDROCHLORIDE 5 MG/1
5 TABLET ORAL
Status: DISCONTINUED | OUTPATIENT
Start: 2023-12-13 | End: 2023-12-13 | Stop reason: HOSPADM

## 2023-12-13 RX ORDER — HYDROMORPHONE HYDROCHLORIDE 2 MG/ML
0.5 INJECTION, SOLUTION INTRAMUSCULAR; INTRAVENOUS; SUBCUTANEOUS
Status: DISCONTINUED | OUTPATIENT
Start: 2023-12-13 | End: 2023-12-23 | Stop reason: HOSPADM

## 2023-12-13 RX ORDER — MEPERIDINE HYDROCHLORIDE 25 MG/ML
25 INJECTION INTRAMUSCULAR; INTRAVENOUS; SUBCUTANEOUS EVERY 10 MIN PRN
Status: DISCONTINUED | OUTPATIENT
Start: 2023-12-13 | End: 2023-12-13 | Stop reason: HOSPADM

## 2023-12-13 RX ORDER — ROCURONIUM BROMIDE 10 MG/ML
INJECTION, SOLUTION INTRAVENOUS
Status: DISCONTINUED | OUTPATIENT
Start: 2023-12-13 | End: 2023-12-13

## 2023-12-13 RX ORDER — IPRATROPIUM BROMIDE AND ALBUTEROL SULFATE 2.5; .5 MG/3ML; MG/3ML
3 SOLUTION RESPIRATORY (INHALATION) EVERY 6 HOURS
Status: DISCONTINUED | OUTPATIENT
Start: 2023-12-13 | End: 2023-12-23 | Stop reason: HOSPADM

## 2023-12-13 RX ORDER — DEXAMETHASONE SODIUM PHOSPHATE 4 MG/ML
INJECTION, SOLUTION INTRA-ARTICULAR; INTRALESIONAL; INTRAMUSCULAR; INTRAVENOUS; SOFT TISSUE
Status: DISCONTINUED | OUTPATIENT
Start: 2023-12-13 | End: 2023-12-13

## 2023-12-13 RX ADMIN — PROPOFOL 50 MG: 10 INJECTION, EMULSION INTRAVENOUS at 04:12

## 2023-12-13 RX ADMIN — SODIUM CHLORIDE, POTASSIUM CHLORIDE, SODIUM LACTATE AND CALCIUM CHLORIDE: 600; 310; 30; 20 INJECTION, SOLUTION INTRAVENOUS at 03:12

## 2023-12-13 RX ADMIN — SODIUM CHLORIDE, POTASSIUM CHLORIDE, SODIUM LACTATE AND CALCIUM CHLORIDE: 600; 310; 30; 20 INJECTION, SOLUTION INTRAVENOUS at 01:12

## 2023-12-13 RX ADMIN — FENTANYL CITRATE 50 MCG: 50 INJECTION INTRAMUSCULAR; INTRAVENOUS at 04:12

## 2023-12-13 RX ADMIN — SODIUM CHLORIDE: 9 INJECTION, SOLUTION INTRAVENOUS at 12:12

## 2023-12-13 RX ADMIN — LIDOCAINE HYDROCHLORIDE 50 MG: 20 INJECTION, SOLUTION INTRAVENOUS at 01:12

## 2023-12-13 RX ADMIN — SODIUM CHLORIDE, POTASSIUM CHLORIDE, SODIUM LACTATE AND CALCIUM CHLORIDE: 600; 310; 30; 20 INJECTION, SOLUTION INTRAVENOUS at 06:12

## 2023-12-13 RX ADMIN — METHOCARBAMOL 1000 MG: 500 TABLET ORAL at 08:12

## 2023-12-13 RX ADMIN — ONDANSETRON 4 MG: 2 INJECTION INTRAMUSCULAR; INTRAVENOUS at 08:12

## 2023-12-13 RX ADMIN — IPRATROPIUM BROMIDE AND ALBUTEROL SULFATE 3 ML: 2.5; .5 SOLUTION RESPIRATORY (INHALATION) at 08:12

## 2023-12-13 RX ADMIN — PHENYLEPHRINE HYDROCHLORIDE 100 MCG: 10 INJECTION INTRAVENOUS at 01:12

## 2023-12-13 RX ADMIN — QUETIAPINE FUMARATE 50 MG: 25 TABLET ORAL at 08:12

## 2023-12-13 RX ADMIN — INDOCYANINE GREEN AND WATER 6.25 MG: KIT at 12:12

## 2023-12-13 RX ADMIN — DEXAMETHASONE SODIUM PHOSPHATE 8 MG: 4 INJECTION, SOLUTION INTRA-ARTICULAR; INTRALESIONAL; INTRAMUSCULAR; INTRAVENOUS; SOFT TISSUE at 01:12

## 2023-12-13 RX ADMIN — ENOXAPARIN SODIUM 40 MG: 40 INJECTION SUBCUTANEOUS at 06:12

## 2023-12-13 RX ADMIN — PROPOFOL 150 MG: 10 INJECTION, EMULSION INTRAVENOUS at 01:12

## 2023-12-13 RX ADMIN — PHENYLEPHRINE HYDROCHLORIDE 100 MCG: 10 INJECTION INTRAVENOUS at 03:12

## 2023-12-13 RX ADMIN — HYDROMORPHONE HYDROCHLORIDE 0.5 MG: 2 INJECTION INTRAMUSCULAR; INTRAVENOUS; SUBCUTANEOUS at 06:12

## 2023-12-13 RX ADMIN — CEFAZOLIN 2 G: 1 INJECTION, POWDER, FOR SOLUTION INTRAMUSCULAR; INTRAVENOUS; PARENTERAL at 01:12

## 2023-12-13 RX ADMIN — MIDAZOLAM 2 MG: 1 INJECTION INTRAMUSCULAR; INTRAVENOUS at 01:12

## 2023-12-13 RX ADMIN — FENTANYL CITRATE 100 MCG: 50 INJECTION INTRAMUSCULAR; INTRAVENOUS at 01:12

## 2023-12-13 RX ADMIN — HYDROMORPHONE HYDROCHLORIDE 0.5 MG: 2 INJECTION INTRAMUSCULAR; INTRAVENOUS; SUBCUTANEOUS at 09:12

## 2023-12-13 RX ADMIN — ROCURONIUM BROMIDE 30 MG: 10 INJECTION, SOLUTION INTRAVENOUS at 01:12

## 2023-12-13 RX ADMIN — SUGAMMADEX 200 MG: 100 INJECTION, SOLUTION INTRAVENOUS at 04:12

## 2023-12-13 RX ADMIN — FENTANYL CITRATE 50 MCG: 50 INJECTION INTRAMUSCULAR; INTRAVENOUS at 01:12

## 2023-12-13 RX ADMIN — KETOROLAC TROMETHAMINE 15 MG: 30 INJECTION, SOLUTION INTRAMUSCULAR; INTRAVENOUS at 06:12

## 2023-12-13 RX ADMIN — ONDANSETRON 4 MG: 2 INJECTION INTRAMUSCULAR; INTRAVENOUS at 01:12

## 2023-12-13 NOTE — TRANSFER OF CARE
Anesthesia Transfer of Care Note    Patient: Neyda Claire    Procedure(s) Performed: Procedure(s) (LRB):  CHOLECYSTECTOMY  REPAIR, HERNIA, INGUINAL  COLON RESECTION, small bowel resection (N/A)  APPENDECTOMY    Patient location: PACU    Anesthesia Type: general    Transport from OR: Transported from OR on 2-3 L/min O2 by NC with adequate spontaneous ventilation    Post pain: adequate analgesia    Post assessment: no apparent anesthetic complications and tolerated procedure well    Post vital signs: stable    Level of consciousness: alert, responds to stimulation and awake    Nausea/Vomiting: no nausea/vomiting    Complications: none    Transfer of care protocol was followed      Last vitals: Visit Vitals  BP (!) 115/48   Pulse 102   Temp 36.8 °C (98.3 °F) (Oral)   Resp 18   SpO2 97%   Breastfeeding No

## 2023-12-13 NOTE — OP NOTE
[facility] - Periop Services  Surgery Department  Operative Note    SUMMARY     Date of Procedure: 12/13/2023     Procedure: Procedure(s) (LRB):  Diagnostic Laparoscopy, converted Exploratory Laparotomy  CHOLECYSTECTOMY  Open Common Bile Duct Exploration  REPAIR, HERNIA, FEMORAL  Small bowel resection (N/A)  APPENDECTOMY   Lysis of Adhesions    Surgeon(s) and Role:     * Hebert Busby DO - Primary    Assisting Surgeon: None    Pre-Operative Diagnosis: Incarcerated inguinal hernia [K40.30]  Gallbladder sludge [K82.8]    Post-Operative Diagnosis: Post-Op Diagnosis Codes:     * Incarcerated inguinal hernia [K40.30]     * Gallbladder sludge [K82.8]    Anesthesia: General    Operative Findings (including complications, if any):  Strangulated knuckle of small bowel and a right femoral hernia; area strictured and ischemic, requiring resection.  Small bowel distended with no working space upon laparoscopy, thus converted to open exploratory laparotomy.  Lysis of adhesions performed.  Incidental appendectomy.  Open cholecystectomy with intraoperative cholangiogram and open common bile duct exploration    Description of Technical Procedures:  Patient was taken the operating room and placed on the operating table in the supine position.  Patient underwent general anesthesia per the anesthesia team.  The abdomen was then prepped and draped in usual sterile fashion.  An incision was made in left upper quadrant a Veress needle was inserted and the abdomen was insufflated to 15 mmHg; patient tolerated insufflation well.  A 5 mm trocar was placed in the left upper abdomen under visualization with the laparoscoped; no injuries identified.  We entered the abdomen and the small bowel was severely distended with very minimal working space; small bowel was also very erythematous.  At this time we elected to convert to open exploratory laparotomy.  We made a midline incision starting just below the sternum extending down to  the below the umbilicus.  We entered through the abdominal fascia and into the abdomen.  We found the small bowel to be severely distended.  We retracted the small bowel out of the abdominal cavity after placing a wound protector and found a knuckle small bowel and a small right femoral hernia defect; we were able to reduce with traction.  The area of the small bowel was very erythematous but viable, but did have a stricture at the site.  We washed cyst area throughout the procedure and due to continued stricturing, we elected to resect the strictured area of the midportion of the ileum.  We performed a side to side anti peristaltic anastomosis with the ANANTH 75, closed the common defect with another load of the ANANTH 75, thus transecting the small portion of small bowel with stricture.  We imbricated with a 2nd layer over the staple line with 3-0 Vicryl Lembert sutures.  We then used a #1 PDS suture to close the femoral defect in running fashion intraperitoneally.  We then performed lysis of adhesions of the small bowel and found a loop of the mid ileum adhered in the pelvis; we freed up all the small bowel and mobilize the small bowel.  The cecum was a electrocautery.  controlled present and distended in the appendix was present with the distal end to be distended and indurated.  We elected to perform an incidental appendectomy.  We ligated the base of the appendix with a 2-0 silk stick tie followed by another stick-tie and then transected with handheld LigaSure; appendix sent to pathology.  The lysis of adhesions took an additional 90 minutes; I will add a 22 modifier to the case.  We then evaluated the gallbladder.  The gallbladder was severely distended with edema of the gallbladder wall.  We then used a right angle and took down the gallbladder off the liver using a top-down approach; bleeding controlled with electrocautery.  We found 2 structures going to the gallbladder and dissected these structures out.  We  doubly clipped and transected the cystic artery.  We placed a clip just adjacent to the neck of the gallbladder and then made a small incision with Metzenbaum scissors and then performed a cholangiogram; the cholangiogram showed adequate filling both proximally and distally; the common bile duct and intrahepatic ducts were dilated.  There was a questionable filling defect where the gallbladder tapered down into the duodenum, even though there was filling into the duodenum.  At this time we elected to do an open common bile duct exploration.  We took a cholangiogram catheter set and placed the catheter into the common bile duct and under fluoroscopy guided the catheter with balloon and pushed free the duct into the duodenum; we made several passes and also dilated the sphincter of Oddi under fluoroscopy.  Repeat cholangiogram showed adequate filling; we still saw small area and suspect this likely was partial filling of the pancreatic duct.  At this time we removed the cholangiogram catheters and placed multiple clips on the cystic duct and transected the cystic duct and the gallbladder was sent to pathology.  We irrigated with sterile water and suctioned clear, bleeding controlled electrocautery.  We placed a 10 Ug[place] CHEVY drain in the pelvis and secured to the skin with 2-0 nylon sutures.  We also irrigated with Irrisept solution.  We then closed the abdominal fascia with #1 looped PDS suture in a running fashion.  We placed a Penrose drain in the subcutaneous tissue and approximated the skin edges with skin staples, secured the drain to the skin with staples.  We placed a mirella dressing and had adequate seal.  Patient was awakened, extubated and taken the PACU in stable condition.  Patient tolerated procedure well.      Estimated Blood Loss (EBL): 50 mL           Implants: * No implants in log *    Specimens:   Specimen (24h ago, onward)       Start     Ordered    12/13/23 7074  Surgical Pathology  RELEASE UPON  ORDERING         12/13/23 1545                            Condition: Good    Disposition: PACU - hemodynamically stable.    Attestation: I was present and scrubbed for the entire procedure.

## 2023-12-13 NOTE — PROGRESS NOTES
Subjective     Patient ID: Neyda Claire is a 63 y.o. female.    Chief Complaint: Abdominal Pain (Abdominal pain x2 days with nausea)    63-year-old  female presents to the clinic for evaluation for abdominal pain.  Patient was seen yesterday evening in the ER for significant abdominal pain, nausea and vomiting.  Patient states he has noticed a bulge in her right groin.  She was lifting heavy packages at work on Wednesday and noticed the increased bulge in her right groin and she had increased pain over the past 2 days; since then she states the bulge did go down, but she has been continued to have abdominal pain, nausea and vomiting.  No bowel movement for 2 days.  CT scan of the abdomen pelvis showed distended gallbladder and mild intrahepatic biliary ductal dilatation; ultrasound of the abdomen showed gallbladder distended with sludge with wall thickness at 1.9 mm and common bile duct at 15.5 mm.  Patient has nausea and vomiting and abdominal pain improved and patient was discharged from the ER to follow up with myself in clinic.  CT scan findings also showed a right inguinal hernia containing a small portion of small bowel or colon with nonspecific dilated small bowel loops, some sigmoid diverticula, no evidence of diverticulitis.  Patient presents to clinic today with continued abdominal pain, nausea and vomiting.  Abdomen distended    Review of Systems   Constitutional: Negative.    HENT: Negative.     Eyes: Negative.    Respiratory:  Negative for shortness of breath.    Cardiovascular:  Negative for chest pain.   Gastrointestinal:  Positive for abdominal distention, abdominal pain, nausea and vomiting. Negative for blood in stool and change in bowel habit.   Genitourinary: Negative.  Negative for hematuria.   Musculoskeletal:  Negative for myalgias.   Neurological:  Negative for dizziness and weakness.   Psychiatric/Behavioral: Negative.            Objective     Physical Exam  Constitutional:        General: She is not in acute distress.     Appearance: Normal appearance.   HENT:      Head: Normocephalic and atraumatic.      Nose: Nose normal.   Eyes:      General: No scleral icterus.     Pupils: Pupils are equal, round, and reactive to light.   Cardiovascular:      Rate and Rhythm: Normal rate.   Pulmonary:      Effort: Pulmonary effort is normal. No respiratory distress.      Breath sounds: Normal breath sounds.   Abdominal:      General: There is distension.      Palpations: Abdomen is soft.      Tenderness: There is generalized abdominal tenderness. There is guarding and rebound. Negative signs include Logan's sign.          Comments: Irreducible small right inguinal hernia; positive tenderness to palpation   Musculoskeletal:         General: Normal range of motion.      Cervical back: Normal range of motion and neck supple.   Skin:     General: Skin is warm.      Coloration: Skin is not jaundiced.   Neurological:      General: No focal deficit present.      Mental Status: She is alert and oriented to person, place, and time.      Cranial Nerves: No cranial nerve deficit.   Psychiatric:         Mood and Affect: Mood normal.          Assessment and Plan     1. Epigastric pain    2. Abdominal pain, unspecified abdominal location  -     Ambulatory referral/consult to General Surgery        Concern for possible small-bowel obstruction; incarcerated right inguinal hernia; unable to reduce in clinic.      To the OR for robot assisted right inguinal hernia repair, possible open; Robotic Cholecystectomy, Possible open with cholangiogram    Risks and benefits explained with the patient including risks for infection, bleeding, injury to surrounding structures, hematoma/seroma formation with need for possible evacuation, possible open.  The patient verbalized understanding, agrees and wishes to proceed with surgery.    I did explain in detail risks of mesh infection with slightly increased risk by doing gallbladder  and hernia at the same time; patient verbalized understanding and agrees to proceed with surgery.         No follow-ups on file.

## 2023-12-13 NOTE — ANESTHESIA PREPROCEDURE EVALUATION
12/13/2023  Neyda Claire is a 63 y.o., female.      Pre-op Assessment    I have reviewed the Patient Summary Reports.     I have reviewed the Nursing Notes. I have reviewed the NPO Status.   I have reviewed the Medications.     Review of Systems  Anesthesia Hx:  No problems with previous Anesthesia                Social:  No Alcohol Use, Smoker       Hematology/Oncology:  Hematology Normal   Oncology Normal                                   EENT/Dental:  EENT/Dental Normal           Cardiovascular:  Cardiovascular Normal                                            Pulmonary:   COPD                     Renal/:  Renal/ Normal                 Hepatic/GI:  Hepatic/GI Normal       Extreme wt loss          Musculoskeletal:  Musculoskeletal Normal                Neurological:  Neurology Normal                                      Endocrine:  Endocrine Normal            Dermatological:  Skin Normal    Psych:  Psychiatric History   bipolar               Physical Exam  General: Well nourished    Airway:  Mallampati: I / I  Mouth Opening: Normal  TM Distance: > 6 cm  Tongue: Normal  Neck ROM: Normal ROM    Chest/Lungs:  Clear to auscultation, Normal Respiratory Rate    Heart:  Rate: Normal  Rhythm: Regular Rhythm        Anesthesia Plan  Type of Anesthesia, risks & benefits discussed:    Anesthesia Type: Gen ETT  Intra-op Monitoring Plan: Standard ASA Monitors  Post Op Pain Control Plan: multimodal analgesia  Induction:  IV  Informed Consent: Informed consent signed with the Patient and all parties understand the risks and agree with anesthesia plan.  All questions answered. Patient consented to blood products? Yes  ASA Score: 2  Day of Surgery Review of History & Physical: H&P Update referred to the surgeon/provider.I have interviewed and examined the patient. I have reviewed the patient's H&P dated: There are no  significant changes.     Ready For Surgery From Anesthesia Perspective.     .

## 2023-12-13 NOTE — OR NURSING
1638-Pt rec'd to RR drowsy, responds to stimulation, NADN, on 2L O2 via n/c, SaO2-97%, right NG tube, mirella dressing to ABD intact with scant amount of sanguineous drainage noted, andrei drain x 1 intact, mckinnon catheter observed, bilateral SCD hoses on, will con't to monitor, safety measures in effect.    1645-ETCO 2 applied and NG tube hooked to low intermittent suction.    1650-Pt placed on room air, SaO2-98%, will con't to monitor.    1700-ABD binder placed on pt, NADN, will con't to monitor.    1710-Pt awake, NADN, mirella dressing and andrei drain remains intact, orders to transfer to room 563.    1730-Pt care released to DaxRN), pt awake, vss temp oral 97.8, resp-14, hr-93, SaO2-97% on room air, b/p 129/63.

## 2023-12-13 NOTE — TELEPHONE ENCOUNTER
----- Message from Nasreen Orta sent at 12/13/2023  8:44 AM CST -----  Regarding: RETURN CALL  PATIENT CALL AND WOULD LIKE A CALL BACK, CALL BACK NUMBER -[phone]

## 2023-12-13 NOTE — ANESTHESIA PROCEDURE NOTES
Intubation    Date/Time: 12/13/2023 1:27 PM    Performed by: Celine Delgado CRNA  Authorized by: Michael Estrada MD    Intubation:     Induction:  Intravenous    Intubated:  Postinduction    Mask Ventilation:  Easy mask    Attempts:  1    Attempted By:  CRNA    Method of Intubation:  Direct    Blade:  Frankie 3    Laryngeal View Grade: Grade I - full view of cords      Difficult Airway Encountered?: No      Complications:  None    Airway Device:  Oral endotracheal tube    Airway Device Size:  7.0    Style/Cuff Inflation:  Cuffed (inflated to minimal occlusive pressure)    Tube secured:  20    Secured at:  The lips    Placement Verified By:  Capnometry    Complicating Factors:  None    Findings Post-Intubation:  BS equal bilateral and atraumatic/condition of teeth unchanged

## 2023-12-14 PROBLEM — Z90.49 S/P APPENDECTOMY: Status: ACTIVE | Noted: 2023-12-14

## 2023-12-14 PROBLEM — K80.00 CHOLELITHIASIS WITH ACUTE CHOLECYSTITIS: Status: ACTIVE | Noted: 2023-12-14

## 2023-12-14 LAB
ANION GAP SERPL CALCULATED.3IONS-SCNC: 12 MMOL/L (ref 7–16)
BASOPHILS # BLD AUTO: 0.01 K/UL (ref 0–0.2)
BASOPHILS NFR BLD AUTO: 0.2 % (ref 0–1)
BUN SERPL-MCNC: 25 MG/DL (ref 7–18)
BUN/CREAT SERPL: 39 (ref 6–20)
CALCIUM SERPL-MCNC: 7.7 MG/DL (ref 8.5–10.1)
CHLORIDE SERPL-SCNC: 104 MMOL/L (ref 98–107)
CO2 SERPL-SCNC: 27 MMOL/L (ref 21–32)
CREAT SERPL-MCNC: 0.64 MG/DL (ref 0.55–1.02)
CRENATED CELLS: ABNORMAL
DIFFERENTIAL METHOD BLD: ABNORMAL
EGFR (NO RACE VARIABLE) (RUSH/TITUS): 99 ML/MIN/1.73M2
EOSINOPHIL # BLD AUTO: 0 K/UL (ref 0–0.5)
EOSINOPHIL NFR BLD AUTO: 0 % (ref 1–4)
ERYTHROCYTE [DISTWIDTH] IN BLOOD BY AUTOMATED COUNT: 13.4 % (ref 11.5–14.5)
GLUCOSE SERPL-MCNC: 89 MG/DL (ref 74–106)
HCT VFR BLD AUTO: 41.9 % (ref 38–47)
HGB BLD-MCNC: 14.3 G/DL (ref 12–16)
IMM GRANULOCYTES # BLD AUTO: 0.52 K/UL (ref 0–0.04)
IMM GRANULOCYTES NFR BLD: 8.8 % (ref 0–0.4)
LYMPHOCYTES # BLD AUTO: 0.93 K/UL (ref 1–4.8)
LYMPHOCYTES NFR BLD AUTO: 15.7 % (ref 27–41)
LYMPHOCYTES NFR BLD MANUAL: 20 % (ref 27–41)
MAGNESIUM SERPL-MCNC: 2.3 MG/DL (ref 1.7–2.3)
MCH RBC QN AUTO: 31.6 PG (ref 27–31)
MCHC RBC AUTO-ENTMCNC: 34.1 G/DL (ref 32–36)
MCV RBC AUTO: 92.5 FL (ref 80–96)
MONOCYTES # BLD AUTO: 0.96 K/UL (ref 0–0.8)
MONOCYTES NFR BLD AUTO: 16.2 % (ref 2–6)
MONOCYTES NFR BLD MANUAL: 13 % (ref 2–6)
MPC BLD CALC-MCNC: 9.6 FL (ref 9.4–12.4)
MYELOCYTES NFR BLD MANUAL: 2 %
NEUTROPHILS # BLD AUTO: 3.51 K/UL (ref 1.8–7.7)
NEUTROPHILS NFR BLD AUTO: 59.1 % (ref 53–65)
NEUTS BAND NFR BLD MANUAL: 5 % (ref 1–5)
NEUTS SEG NFR BLD MANUAL: 60 % (ref 50–62)
NRBC # BLD AUTO: 0 X10E3/UL
NRBC, AUTO (.00): 0 %
PHOSPHATE SERPL-MCNC: 2.7 MG/DL (ref 2.5–4.5)
PLATELET # BLD AUTO: 253 K/UL (ref 150–400)
PLATELET MORPHOLOGY: NORMAL
POTASSIUM SERPL-SCNC: 3.4 MMOL/L (ref 3.5–5.1)
RBC # BLD AUTO: 4.53 M/UL (ref 4.2–5.4)
REACTIVE LYMPHOCYTES: ABNORMAL
SMUDGE CELLS BLD QL SMEAR: ABNORMAL
SODIUM SERPL-SCNC: 140 MMOL/L (ref 136–145)
TOXIC GRANULES BLD QL SMEAR: ABNORMAL
WBC # BLD AUTO: 5.93 K/UL (ref 4.5–11)

## 2023-12-14 PROCEDURE — 83735 ASSAY OF MAGNESIUM: CPT | Performed by: STUDENT IN AN ORGANIZED HEALTH CARE EDUCATION/TRAINING PROGRAM

## 2023-12-14 PROCEDURE — 25000003 PHARM REV CODE 250: Performed by: STUDENT IN AN ORGANIZED HEALTH CARE EDUCATION/TRAINING PROGRAM

## 2023-12-14 PROCEDURE — 94761 N-INVAS EAR/PLS OXIMETRY MLT: CPT

## 2023-12-14 PROCEDURE — 99900035 HC TECH TIME PER 15 MIN (STAT)

## 2023-12-14 PROCEDURE — 63600175 PHARM REV CODE 636 W HCPCS: Performed by: STUDENT IN AN ORGANIZED HEALTH CARE EDUCATION/TRAINING PROGRAM

## 2023-12-14 PROCEDURE — 94664 DEMO&/EVAL PT USE INHALER: CPT

## 2023-12-14 PROCEDURE — 94640 AIRWAY INHALATION TREATMENT: CPT

## 2023-12-14 PROCEDURE — 84100 ASSAY OF PHOSPHORUS: CPT | Performed by: STUDENT IN AN ORGANIZED HEALTH CARE EDUCATION/TRAINING PROGRAM

## 2023-12-14 PROCEDURE — 25000242 PHARM REV CODE 250 ALT 637 W/ HCPCS: Performed by: STUDENT IN AN ORGANIZED HEALTH CARE EDUCATION/TRAINING PROGRAM

## 2023-12-14 PROCEDURE — 80048 BASIC METABOLIC PNL TOTAL CA: CPT | Performed by: STUDENT IN AN ORGANIZED HEALTH CARE EDUCATION/TRAINING PROGRAM

## 2023-12-14 PROCEDURE — 27000221 HC OXYGEN, UP TO 24 HOURS

## 2023-12-14 PROCEDURE — 85025 COMPLETE CBC W/AUTO DIFF WBC: CPT | Performed by: STUDENT IN AN ORGANIZED HEALTH CARE EDUCATION/TRAINING PROGRAM

## 2023-12-14 PROCEDURE — 27000173 HC ACAPELLA DEVICE DH OR DM

## 2023-12-14 PROCEDURE — 11000001 HC ACUTE MED/SURG PRIVATE ROOM

## 2023-12-14 RX ORDER — MUPIROCIN 20 MG/G
OINTMENT TOPICAL 2 TIMES DAILY
Status: COMPLETED | OUTPATIENT
Start: 2023-12-14 | End: 2023-12-18

## 2023-12-14 RX ADMIN — METHOCARBAMOL 1000 MG: 500 TABLET ORAL at 08:12

## 2023-12-14 RX ADMIN — HYDROMORPHONE HYDROCHLORIDE 0.5 MG: 2 INJECTION INTRAMUSCULAR; INTRAVENOUS; SUBCUTANEOUS at 01:12

## 2023-12-14 RX ADMIN — ONDANSETRON 4 MG: 2 INJECTION INTRAMUSCULAR; INTRAVENOUS at 05:12

## 2023-12-14 RX ADMIN — HYDROMORPHONE HYDROCHLORIDE 0.5 MG: 2 INJECTION INTRAMUSCULAR; INTRAVENOUS; SUBCUTANEOUS at 07:12

## 2023-12-14 RX ADMIN — IPRATROPIUM BROMIDE AND ALBUTEROL SULFATE 3 ML: 2.5; .5 SOLUTION RESPIRATORY (INHALATION) at 12:12

## 2023-12-14 RX ADMIN — METHOCARBAMOL 1000 MG: 500 TABLET ORAL at 05:12

## 2023-12-14 RX ADMIN — KETOROLAC TROMETHAMINE 15 MG: 30 INJECTION, SOLUTION INTRAMUSCULAR; INTRAVENOUS at 11:12

## 2023-12-14 RX ADMIN — OXYCODONE HYDROCHLORIDE AND ACETAMINOPHEN 1 TABLET: 5; 325 TABLET ORAL at 09:12

## 2023-12-14 RX ADMIN — ONDANSETRON 4 MG: 2 INJECTION INTRAMUSCULAR; INTRAVENOUS at 11:12

## 2023-12-14 RX ADMIN — KETOROLAC TROMETHAMINE 15 MG: 30 INJECTION, SOLUTION INTRAMUSCULAR; INTRAVENOUS at 05:12

## 2023-12-14 RX ADMIN — OXYCODONE HYDROCHLORIDE AND ACETAMINOPHEN 1 TABLET: 5; 325 TABLET ORAL at 12:12

## 2023-12-14 RX ADMIN — MUPIROCIN: 20 OINTMENT TOPICAL at 09:12

## 2023-12-14 RX ADMIN — METHOCARBAMOL 1000 MG: 500 TABLET ORAL at 09:12

## 2023-12-14 RX ADMIN — SODIUM CHLORIDE, POTASSIUM CHLORIDE, SODIUM LACTATE AND CALCIUM CHLORIDE: 600; 310; 30; 20 INJECTION, SOLUTION INTRAVENOUS at 06:12

## 2023-12-14 RX ADMIN — ENOXAPARIN SODIUM 40 MG: 40 INJECTION SUBCUTANEOUS at 05:12

## 2023-12-14 RX ADMIN — KETOROLAC TROMETHAMINE 15 MG: 30 INJECTION, SOLUTION INTRAMUSCULAR; INTRAVENOUS at 12:12

## 2023-12-14 RX ADMIN — IPRATROPIUM BROMIDE AND ALBUTEROL SULFATE 3 ML: 2.5; .5 SOLUTION RESPIRATORY (INHALATION) at 07:12

## 2023-12-14 RX ADMIN — QUETIAPINE FUMARATE 50 MG: 25 TABLET ORAL at 09:12

## 2023-12-14 RX ADMIN — SODIUM CHLORIDE, POTASSIUM CHLORIDE, SODIUM LACTATE AND CALCIUM CHLORIDE: 600; 310; 30; 20 INJECTION, SOLUTION INTRAVENOUS at 07:12

## 2023-12-14 NOTE — ASSESSMENT & PLAN NOTE
12/14:  Status post lysis of adhesions with small bowel resection.  White count 5, afebrile.  Nanci dressing to abdomen dry and intact

## 2023-12-14 NOTE — PLAN OF CARE
[facility] - [facility] Telemetry  Initial Discharge Assessment       Primary Care Provider: Ashleigh Figueroa MD    Admission Diagnosis: Incarcerated inguinal hernia [K40.30]  Gallbladder sludge [K82.8]  Epigastric pain [R10.13]    Admission Date: 12/13/2023  Expected Discharge Date:     Transition of Care Barriers: Underinsured    Payor: BLUE CROSS BLUE SHIELD / Plan: BCBS ALL OUT OF STATE / Product Type: PPO /     Extended Emergency Contact Information  Primary Emergency Contact: Magdalena Patel  Address: [address] [place] [address] Road           [address]  Mobile Phone: [phone]  Relation: Mother  Preferred language: English   needed? No    Discharge Plan A: Home with family, Home Health  Discharge Plan B: Home with family, Home Health      [facility] [address] [place] [address] [address]  [address]  [facility] [address]  Phone: [phone] Fax: [phone]    [facility] [address]  [address] [facility] [address]  Phone: [phone] Fax: [phone]      Initial Assessment (most recent)       Adult Discharge Assessment - 12/14/23 1413          Discharge Assessment    Assessment Type Discharge Planning Assessment     Source of Information patient;family     Communicated TIERA with patient/caregiver Date not available/Unable to determine     People in Home alone     Facility Arrived From: home     Do you expect to return to your current living situation? No     Do you have help at home or someone to help you manage your care at home? Yes     Who are your caregiver(s) and their phone number(s)? Magdalena Patel (mother) [phone]     Prior to hospitilization cognitive status: Unable to Assess     Current cognitive status: Alert/Oriented     Walking or Climbing Stairs Difficulty no     Dressing/Bathing Difficulty no     Home Accessibility not wheelchair accessible     Home Layout Able to live on 1st floor      Equipment Currently Used at Home none     Readmission within 30 days? No     Patient currently being followed by outpatient case management? No     Do you currently have service(s) that help you manage your care at home? No     Do you take prescription medications? Yes     Do you have prescription coverage? Yes     Do you have any problems affording any of your prescribed medications? TBD     Is the patient taking medications as prescribed? yes     Who is going to help you get home at discharge? Magdalena Patel (mother) [phone]     How do you get to doctors appointments? family or friend will provide;car, drives self     Are you on dialysis? No     Do you take coumadin? No     Discharge Plan A Home with family;Home Health     Discharge Plan B Home with family;Home Health     Discharge Plan discussed with: Patient;Parent(s)     Name(s) and Number(s) Magdalena Patel (mother) [phone]     Transition of Care Barriers Underinsured        Physical Activity    On average, how many days per week do you engage in moderate to strenuous exercise (like a brisk walk)? 0 days     On average, how many minutes do you engage in exercise at this level? 0 min        Financial Resource Strain    How hard is it for you to pay for the very basics like food, housing, medical care, and heating? Not hard at all        Housing Stability    In the last 12 months, was there a time when you were not able to pay the mortgage or rent on time? No     In the last 12 months, how many places have you lived? 1     In the last 12 months, was there a time when you did not have a steady place to sleep or slept in a shelter (including now)? No        Transportation Needs    In the past 12 months, has lack of transportation kept you from medical appointments or from getting medications? No     In the past 12 months, has lack of transportation kept you from meetings, work, or from getting things needed for daily living? No        Food Insecurity    Within  the past 12 months, you worried that your food would run out before you got the money to buy more. Never true     Within the past 12 months, the food you bought just didn't last and you didn't have money to get more. Never true        Stress    Do you feel stress - tense, restless, nervous, or anxious, or unable to sleep at night because your mind is troubled all the time - these days? To some extent        Social Connections    In a typical week, how many times do you talk on the phone with family, friends, or neighbors? More than three times a week     How often do you get together with friends or relatives? More than three times a week     How often do you attend Orthodox or Episcopalian services? 1 to 4 times per year     Do you belong to any clubs or organizations such as Orthodox groups, unions, fraternal or athletic groups, or school groups? No     How often do you attend meetings of the clubs or organizations you belong to? Never     Are you , , , , never , or living with a partner?         Alcohol Use    Q1: How often do you have a drink containing alcohol? Never     Q2: How many drinks containing alcohol do you have on a typical day when you are drinking? Patient does not drink     Q3: How often do you have six or more drinks on one occasion? Never                   Sw spoke with pt at bedside to complete dcp initial assessment. Parents were present. Pta, pt lived home alone. No hh, no dme. Pt and family anticipate dc home with parents and [facility]. Ss following for dc needs and recommendations.

## 2023-12-14 NOTE — PROGRESS NOTES
[facility] - [facility]  General Surgery  Progress Note    Subjective:     History of Present Illness:  No notes on file    Post-Op Info:  Procedure(s) (LRB):  LAPAROSCOPY  REPAIR, HERNIA, INGUINAL  SMALL BOWEL RESECTION (N/A)  APPENDECTOMY  CHOLANGIOGRAM (N/A)  CHOLECYSTECTOMY  LYSIS, ADHESIONS   1 Day Post-Op     Interval History:  No acute events overnight    Medications:  Continuous Infusions:   lactated ringers 75 mL/hr at 12/14/23 0607     Scheduled Meds:   albuterol-ipratropium  3 mL Nebulization Q6H    enoxaparin  40 mg Subcutaneous Daily    ketorolac  15 mg Intravenous Q6H    methocarbamoL  1,000 mg Oral TID    mupirocin   Nasal BID    QUEtiapine  50 mg Oral QHS     PRN Meds:acetaminophen, HYDROmorphone, melatonin, ondansetron, ondansetron, oxyCODONE-acetaminophen, prochlorperazine, sodium chloride 0.9%     Review of patient's allergies indicates:   Allergen Reactions    Sulfa (sulfonamide antibiotics)      Objective:     Vital Signs (Most Recent):  Temp: 98.4 °F (36.9 °C) (12/14/23 1414)  Pulse: (!) 118 (12/14/23 1414)  Resp: 18 (12/14/23 1414)  BP: (!) 104/50 (12/14/23 1414)  SpO2: (!) 92 % (12/14/23 1414) Vital Signs (24h Range):  Temp:  [97.4 °F (36.3 °C)-98.4 °F (36.9 °C)] 98.4 °F (36.9 °C)  Pulse:  [] 118  Resp:  [14-22] 18  SpO2:  [92 %-99 %] 92 %  BP: (100-129)/(43-73) 104/50     Weight: 42.2 kg (93 lb 0.6 oz)  Body mass index is 14.15 kg/m².    Intake/Output - Last 3 Shifts         12/12 0700  12/13 0659 12/13 0700  12/14 0659 12/14 0700  12/15 0659    I.V. (mL/kg)  50 (1.2)     IV Piggyback  2400     Total Intake(mL/kg)  2450 (58.1)     Urine (mL/kg/hr)  360     Emesis/NG output  1000     Drains   1250    Other  1100     Blood  50     Total Output  2510 1250    Net  -60 -1250                    Physical Exam  Vitals reviewed.   HENT:      Head: Normocephalic.   Eyes:      Extraocular Movements: Extraocular movements intact.   Cardiovascular:      Rate and Rhythm:  Normal rate.   Pulmonary:      Effort: Pulmonary effort is normal.      Breath sounds: Normal breath sounds.   Abdominal:      General: There is no distension.      Palpations: Abdomen is soft.      Tenderness: There is abdominal tenderness.   Musculoskeletal:         General: Normal range of motion.   Skin:     General: Skin is warm and dry.      Capillary Refill: Capillary refill takes less than 2 seconds.   Neurological:      General: No focal deficit present.      Mental Status: She is alert and oriented to person, place, and time.      Cranial Nerves: No cranial nerve deficit.      Sensory: No sensory deficit.   Psychiatric:         Mood and Affect: Mood normal.          Significant Labs:  I have reviewed all pertinent lab results within the past 24 hours.    Significant Diagnostics:  I have reviewed all pertinent imaging results/findings within the past 24 hours.  Assessment/Plan:     S/P small bowel resection  12/14:  Status post lysis of adhesions with small bowel resection.  White count 5, afebrile.  Nanci dressing to abdomen dry and intact    Cholelithiasis with acute cholecystitis  12/14:  Status post cholecystectomy, femoral hernia repair small-bowel resection, lysis of adhesions appendectomy.  Abdominal incision dry and intact.  No flatus reported NPO except for ice chips.  Abdomen appropriately tender.  Afebrile, white count 5.  Creatinine 0.64 BUN 25.  BUN/creatinine ratio of 39 mild dehydration likely from abdominal surgery.  Continue LR 75 cc an hour        ELIECER Negron  General Surgery  [facility] - [facility]

## 2023-12-14 NOTE — ANESTHESIA POSTPROCEDURE EVALUATION
Anesthesia Post Evaluation    Patient: Neyda Claire    Procedure(s) Performed: Procedure(s) (LRB):  LAPAROSCOPY  REPAIR, HERNIA, INGUINAL  SMALL BOWEL RESECTION (N/A)  APPENDECTOMY  CHOLANGIOGRAM (N/A)  CHOLECYSTECTOMY  LYSIS, ADHESIONS    Final Anesthesia Type: general      Patient location during evaluation: PACU  Patient participation: Yes- Able to Participate  Level of consciousness: awake and sedated  Post-procedure vital signs: reviewed and stable  Pain management: adequate  Airway patency: patent    PONV status at discharge: No PONV  Anesthetic complications: no      Cardiovascular status: blood pressure returned to baseline  Respiratory status: unassisted  Hydration status: euvolemic  Follow-up not needed.              Vitals Value Taken Time   /54 12/14/23 1008   Temp 36.5 °C (97.7 °F) 12/14/23 1008   Pulse 111 12/14/23 1213   Resp 18 12/14/23 1213   SpO2 94 % 12/14/23 1213         Event Time   Out of Recovery 17:10:00         Pain/Aidan Score: Pain Rating Prior to Med Admin: 8 (12/14/2023 12:08 PM)  Pain Rating Post Med Admin: 6 (12/14/2023  8:25 AM)  Aidan Score: 10 (12/13/2023  5:10 PM)

## 2023-12-14 NOTE — ASSESSMENT & PLAN NOTE
12/14:  Status post cholecystectomy, femoral hernia repair small-bowel resection, lysis of adhesions appendectomy.  Abdominal incision dry and intact.  No flatus reported NPO except for ice chips.  Abdomen appropriately tender.  Afebrile, white count 5.  Creatinine 0.64 BUN 25.  BUN/creatinine ratio of 39 mild dehydration likely from abdominal surgery.  Continue LR 75 cc an hour

## 2023-12-14 NOTE — SUBJECTIVE & OBJECTIVE
Interval History:  No acute events overnight    Medications:  Continuous Infusions:   lactated ringers 75 mL/hr at 12/14/23 0607     Scheduled Meds:   albuterol-ipratropium  3 mL Nebulization Q6H    enoxaparin  40 mg Subcutaneous Daily    ketorolac  15 mg Intravenous Q6H    methocarbamoL  1,000 mg Oral TID    mupirocin   Nasal BID    QUEtiapine  50 mg Oral QHS     PRN Meds:acetaminophen, HYDROmorphone, melatonin, ondansetron, ondansetron, oxyCODONE-acetaminophen, prochlorperazine, sodium chloride 0.9%     Review of patient's allergies indicates:   Allergen Reactions    Sulfa (sulfonamide antibiotics)      Objective:     Vital Signs (Most Recent):  Temp: 98.4 °F (36.9 °C) (12/14/23 1414)  Pulse: (!) 118 (12/14/23 1414)  Resp: 18 (12/14/23 1414)  BP: (!) 104/50 (12/14/23 1414)  SpO2: (!) 92 % (12/14/23 1414) Vital Signs (24h Range):  Temp:  [97.4 °F (36.3 °C)-98.4 °F (36.9 °C)] 98.4 °F (36.9 °C)  Pulse:  [] 118  Resp:  [14-22] 18  SpO2:  [92 %-99 %] 92 %  BP: (100-129)/(43-73) 104/50     Weight: 42.2 kg (93 lb 0.6 oz)  Body mass index is 14.15 kg/m².    Intake/Output - Last 3 Shifts         12/12 0700  12/13 0659 12/13 0700  12/14 0659 12/14 0700  12/15 0659    I.V. (mL/kg)  50 (1.2)     IV Piggyback  2400     Total Intake(mL/kg)  2450 (58.1)     Urine (mL/kg/hr)  360     Emesis/NG output  1000     Drains   1250    Other  1100     Blood  50     Total Output  2510 1250    Net  -60 -1250                    Physical Exam  Vitals reviewed.   HENT:      Head: Normocephalic.   Eyes:      Extraocular Movements: Extraocular movements intact.   Cardiovascular:      Rate and Rhythm: Normal rate.   Pulmonary:      Effort: Pulmonary effort is normal.      Breath sounds: Normal breath sounds.   Abdominal:      General: There is no distension.      Palpations: Abdomen is soft.      Tenderness: There is abdominal tenderness.   Musculoskeletal:         General: Normal range of motion.   Skin:     General: Skin is warm and dry.       Capillary Refill: Capillary refill takes less than 2 seconds.   Neurological:      General: No focal deficit present.      Mental Status: She is alert and oriented to person, place, and time.      Cranial Nerves: No cranial nerve deficit.      Sensory: No sensory deficit.   Psychiatric:         Mood and Affect: Mood normal.          Significant Labs:  I have reviewed all pertinent lab results within the past 24 hours.    Significant Diagnostics:  I have reviewed all pertinent imaging results/findings within the past 24 hours.

## 2023-12-15 LAB
ANION GAP SERPL CALCULATED.3IONS-SCNC: 12 MMOL/L (ref 7–16)
ATYPICAL LYMPHOCYTES: ABNORMAL
BASOPHILS # BLD AUTO: 0 K/UL (ref 0–0.2)
BASOPHILS NFR BLD AUTO: 0 % (ref 0–1)
BUN SERPL-MCNC: 27 MG/DL (ref 7–18)
BUN/CREAT SERPL: 64 (ref 6–20)
CALCIUM SERPL-MCNC: 8.1 MG/DL (ref 8.5–10.1)
CHLORIDE SERPL-SCNC: 99 MMOL/L (ref 98–107)
CO2 SERPL-SCNC: 34 MMOL/L (ref 21–32)
CREAT SERPL-MCNC: 0.42 MG/DL (ref 0.55–1.02)
DACRYOCYTES BLD QL SMEAR: ABNORMAL
DIFFERENTIAL METHOD BLD: ABNORMAL
EGFR (NO RACE VARIABLE) (RUSH/TITUS): 110 ML/MIN/1.73M2
EOSINOPHIL # BLD AUTO: 0.01 K/UL (ref 0–0.5)
EOSINOPHIL NFR BLD AUTO: 0.3 % (ref 1–4)
ERYTHROCYTE [DISTWIDTH] IN BLOOD BY AUTOMATED COUNT: 13.7 % (ref 11.5–14.5)
GLUCOSE SERPL-MCNC: 73 MG/DL (ref 74–106)
HCT VFR BLD AUTO: 39.4 % (ref 38–47)
HGB BLD-MCNC: 13.3 G/DL (ref 12–16)
IMM GRANULOCYTES # BLD AUTO: 0.02 K/UL (ref 0–0.04)
IMM GRANULOCYTES NFR BLD: 0.5 % (ref 0–0.4)
LYMPHOCYTES # BLD AUTO: 1.11 K/UL (ref 1–4.8)
LYMPHOCYTES NFR BLD AUTO: 28.2 % (ref 27–41)
LYMPHOCYTES NFR BLD MANUAL: 29 % (ref 27–41)
MAGNESIUM SERPL-MCNC: 2.8 MG/DL (ref 1.7–2.3)
MCH RBC QN AUTO: 31.3 PG (ref 27–31)
MCHC RBC AUTO-ENTMCNC: 33.8 G/DL (ref 32–36)
MCV RBC AUTO: 92.7 FL (ref 80–96)
MONOCYTES # BLD AUTO: 0.69 K/UL (ref 0–0.8)
MONOCYTES NFR BLD AUTO: 17.5 % (ref 2–6)
MONOCYTES NFR BLD MANUAL: 16 % (ref 2–6)
MPC BLD CALC-MCNC: 9.9 FL (ref 9.4–12.4)
NEUTROPHILS # BLD AUTO: 2.11 K/UL (ref 1.8–7.7)
NEUTROPHILS NFR BLD AUTO: 53.5 % (ref 53–65)
NEUTS BAND NFR BLD MANUAL: 6 % (ref 1–5)
NEUTS SEG NFR BLD MANUAL: 49 % (ref 50–62)
NRBC # BLD AUTO: 0 X10E3/UL
NRBC, AUTO (.00): 0 %
PHOSPHATE SERPL-MCNC: 1.9 MG/DL (ref 2.5–4.5)
PLATELET # BLD AUTO: 235 K/UL (ref 150–400)
PLATELET MORPHOLOGY: ABNORMAL
POTASSIUM SERPL-SCNC: 2.9 MMOL/L (ref 3.5–5.1)
RBC # BLD AUTO: 4.25 M/UL (ref 4.2–5.4)
SODIUM SERPL-SCNC: 142 MMOL/L (ref 136–145)
WBC # BLD AUTO: 3.94 K/UL (ref 4.5–11)

## 2023-12-15 PROCEDURE — 11000001 HC ACUTE MED/SURG PRIVATE ROOM

## 2023-12-15 PROCEDURE — 97162 PT EVAL MOD COMPLEX 30 MIN: CPT

## 2023-12-15 PROCEDURE — 85025 COMPLETE CBC W/AUTO DIFF WBC: CPT | Performed by: STUDENT IN AN ORGANIZED HEALTH CARE EDUCATION/TRAINING PROGRAM

## 2023-12-15 PROCEDURE — 97110 THERAPEUTIC EXERCISES: CPT

## 2023-12-15 PROCEDURE — 63600175 PHARM REV CODE 636 W HCPCS: Performed by: STUDENT IN AN ORGANIZED HEALTH CARE EDUCATION/TRAINING PROGRAM

## 2023-12-15 PROCEDURE — 94761 N-INVAS EAR/PLS OXIMETRY MLT: CPT

## 2023-12-15 PROCEDURE — 27000173 HC ACAPELLA DEVICE DH OR DM

## 2023-12-15 PROCEDURE — 99900035 HC TECH TIME PER 15 MIN (STAT)

## 2023-12-15 PROCEDURE — 94664 DEMO&/EVAL PT USE INHALER: CPT

## 2023-12-15 PROCEDURE — 94640 AIRWAY INHALATION TREATMENT: CPT

## 2023-12-15 PROCEDURE — 83735 ASSAY OF MAGNESIUM: CPT | Performed by: STUDENT IN AN ORGANIZED HEALTH CARE EDUCATION/TRAINING PROGRAM

## 2023-12-15 PROCEDURE — 25000003 PHARM REV CODE 250: Performed by: STUDENT IN AN ORGANIZED HEALTH CARE EDUCATION/TRAINING PROGRAM

## 2023-12-15 PROCEDURE — 84100 ASSAY OF PHOSPHORUS: CPT | Performed by: STUDENT IN AN ORGANIZED HEALTH CARE EDUCATION/TRAINING PROGRAM

## 2023-12-15 PROCEDURE — 80048 BASIC METABOLIC PNL TOTAL CA: CPT | Performed by: STUDENT IN AN ORGANIZED HEALTH CARE EDUCATION/TRAINING PROGRAM

## 2023-12-15 PROCEDURE — 25000242 PHARM REV CODE 250 ALT 637 W/ HCPCS: Performed by: STUDENT IN AN ORGANIZED HEALTH CARE EDUCATION/TRAINING PROGRAM

## 2023-12-15 RX ORDER — POTASSIUM CHLORIDE 7.45 MG/ML
20 INJECTION INTRAVENOUS
Status: COMPLETED | OUTPATIENT
Start: 2023-12-15 | End: 2023-12-15

## 2023-12-15 RX ORDER — DEXTROSE MONOHYDRATE, SODIUM CHLORIDE, AND POTASSIUM CHLORIDE 50; 1.49; 4.5 G/1000ML; G/1000ML; G/1000ML
INJECTION, SOLUTION INTRAVENOUS CONTINUOUS
Status: DISCONTINUED | OUTPATIENT
Start: 2023-12-15 | End: 2023-12-19

## 2023-12-15 RX ADMIN — METHOCARBAMOL 1000 MG: 500 TABLET ORAL at 05:12

## 2023-12-15 RX ADMIN — HYDROMORPHONE HYDROCHLORIDE 0.5 MG: 2 INJECTION INTRAMUSCULAR; INTRAVENOUS; SUBCUTANEOUS at 06:12

## 2023-12-15 RX ADMIN — POTASSIUM CHLORIDE, DEXTROSE MONOHYDRATE AND SODIUM CHLORIDE: 150; 5; 450 INJECTION, SOLUTION INTRAVENOUS at 10:12

## 2023-12-15 RX ADMIN — ONDANSETRON 4 MG: 2 INJECTION INTRAMUSCULAR; INTRAVENOUS at 06:12

## 2023-12-15 RX ADMIN — QUETIAPINE FUMARATE 50 MG: 25 TABLET ORAL at 09:12

## 2023-12-15 RX ADMIN — METHOCARBAMOL 1000 MG: 500 TABLET ORAL at 09:12

## 2023-12-15 RX ADMIN — IPRATROPIUM BROMIDE AND ALBUTEROL SULFATE 3 ML: 2.5; .5 SOLUTION RESPIRATORY (INHALATION) at 12:12

## 2023-12-15 RX ADMIN — MUPIROCIN: 20 OINTMENT TOPICAL at 09:12

## 2023-12-15 RX ADMIN — HYDROMORPHONE HYDROCHLORIDE 0.5 MG: 2 INJECTION INTRAMUSCULAR; INTRAVENOUS; SUBCUTANEOUS at 11:12

## 2023-12-15 RX ADMIN — HYDROMORPHONE HYDROCHLORIDE 0.5 MG: 2 INJECTION INTRAMUSCULAR; INTRAVENOUS; SUBCUTANEOUS at 08:12

## 2023-12-15 RX ADMIN — METHOCARBAMOL 1000 MG: 500 TABLET ORAL at 10:12

## 2023-12-15 RX ADMIN — POTASSIUM CHLORIDE, DEXTROSE MONOHYDRATE AND SODIUM CHLORIDE: 150; 5; 450 INJECTION, SOLUTION INTRAVENOUS at 08:12

## 2023-12-15 RX ADMIN — OXYCODONE HYDROCHLORIDE AND ACETAMINOPHEN 1 TABLET: 5; 325 TABLET ORAL at 10:12

## 2023-12-15 RX ADMIN — POTASSIUM PHOSPHATE, MONOBASIC POTASSIUM PHOSPHATE, DIBASIC 15 MMOL: 224; 236 INJECTION, SOLUTION, CONCENTRATE INTRAVENOUS at 08:12

## 2023-12-15 RX ADMIN — HYDROMORPHONE HYDROCHLORIDE 0.5 MG: 2 INJECTION INTRAMUSCULAR; INTRAVENOUS; SUBCUTANEOUS at 04:12

## 2023-12-15 RX ADMIN — ONDANSETRON 4 MG: 2 INJECTION INTRAMUSCULAR; INTRAVENOUS at 05:12

## 2023-12-15 RX ADMIN — ENOXAPARIN SODIUM 40 MG: 40 INJECTION SUBCUTANEOUS at 05:12

## 2023-12-15 RX ADMIN — POTASSIUM CHLORIDE 20 MEQ: 7.46 INJECTION, SOLUTION INTRAVENOUS at 11:12

## 2023-12-15 RX ADMIN — POTASSIUM CHLORIDE 20 MEQ: 7.46 INJECTION, SOLUTION INTRAVENOUS at 01:12

## 2023-12-15 RX ADMIN — HYDROMORPHONE HYDROCHLORIDE 0.5 MG: 2 INJECTION INTRAMUSCULAR; INTRAVENOUS; SUBCUTANEOUS at 09:12

## 2023-12-15 RX ADMIN — KETOROLAC TROMETHAMINE 15 MG: 30 INJECTION, SOLUTION INTRAMUSCULAR; INTRAVENOUS at 05:12

## 2023-12-15 RX ADMIN — IPRATROPIUM BROMIDE AND ALBUTEROL SULFATE 3 ML: 2.5; .5 SOLUTION RESPIRATORY (INHALATION) at 07:12

## 2023-12-15 RX ADMIN — KETOROLAC TROMETHAMINE 15 MG: 30 INJECTION, SOLUTION INTRAMUSCULAR; INTRAVENOUS at 11:12

## 2023-12-15 RX ADMIN — ONDANSETRON 4 MG: 2 INJECTION INTRAMUSCULAR; INTRAVENOUS at 11:12

## 2023-12-15 RX ADMIN — MUPIROCIN: 20 OINTMENT TOPICAL at 08:12

## 2023-12-15 RX ADMIN — OXYCODONE HYDROCHLORIDE AND ACETAMINOPHEN 1 TABLET: 5; 325 TABLET ORAL at 05:12

## 2023-12-15 RX ADMIN — POTASSIUM CHLORIDE 20 MEQ: 7.46 INJECTION, SOLUTION INTRAVENOUS at 03:12

## 2023-12-15 RX ADMIN — HYDROMORPHONE HYDROCHLORIDE 0.5 MG: 2 INJECTION INTRAMUSCULAR; INTRAVENOUS; SUBCUTANEOUS at 01:12

## 2023-12-15 NOTE — SUBJECTIVE & OBJECTIVE
Interval History: Stable, No acute events overnight. +flatus, no stool. Large amount out of NG tube; patient eating too much ice    Medications:  Continuous Infusions:   dextrose 5 % and 0.45 % NaCl with KCl 20 mEq       Scheduled Meds:   albuterol-ipratropium  3 mL Nebulization Q6H    enoxaparin  40 mg Subcutaneous Daily    ketorolac  15 mg Intravenous Q6H    methocarbamoL  1,000 mg Oral TID    mupirocin   Nasal BID    potassium chloride  20 mEq Intravenous Q2H    potassium phosphate IVPB  15 mmol Intravenous Once    QUEtiapine  50 mg Oral QHS     PRN Meds:acetaminophen, HYDROmorphone, melatonin, ondansetron, ondansetron, oxyCODONE-acetaminophen, prochlorperazine, sodium chloride 0.9%     Review of patient's allergies indicates:   Allergen Reactions    Sulfa (sulfonamide antibiotics)      Objective:     Vital Signs (Most Recent):  Temp: 97.5 °F (36.4 °C) (12/15/23 0603)  Pulse: 110 (12/15/23 0603)  Resp: 18 (12/15/23 0603)  BP: (!) 116/54 (12/15/23 0603)  SpO2: 98 % (12/15/23 0603) Vital Signs (24h Range):  Temp:  [97.5 °F (36.4 °C)-98.4 °F (36.9 °C)] 97.5 °F (36.4 °C)  Pulse:  [] 110  Resp:  [18-20] 18  SpO2:  [92 %-100 %] 98 %  BP: (101-116)/(50-58) 116/54     Weight: 42.2 kg (93 lb 0.6 oz)  Body mass index is 14.15 kg/m².    Intake/Output - Last 3 Shifts         12/13 0700  12/14 0659 12/14 0700  12/15 0659 12/15 0700 12/16 0659    I.V. (mL/kg) 50 (1.2) 2648.8 (62.8)     IV Piggyback 2400      Total Intake(mL/kg) 2450 (58.1) 2648.8 (62.8)     Urine (mL/kg/hr) 360 1500 (1.5)     Emesis/NG output 1000      Drains  4045     Other 1100      Blood 50      Total Output 2510 5545     Net -60 -6006.3                     Physical Exam  Constitutional:       General: She is not in acute distress.     Appearance: Normal appearance.   HENT:      Head: Normocephalic.   Cardiovascular:      Rate and Rhythm: Normal rate.   Pulmonary:      Effort: Pulmonary effort is normal. No respiratory distress.   Abdominal:       General: There is no distension.      Tenderness: There is no abdominal tenderness.      Comments: ATTP; midline incision C/D/I; CHEVY drain with SS drainage   Musculoskeletal:         General: Normal range of motion.   Skin:     General: Skin is warm.      Coloration: Skin is not jaundiced.   Neurological:      General: No focal deficit present.      Mental Status: She is alert and oriented to person, place, and time.      Cranial Nerves: No cranial nerve deficit.          Significant Labs:  I have reviewed all pertinent lab results within the past 24 hours.  CBC:   Recent Labs   Lab 12/15/23  0315   WBC 3.94*   RBC 4.25   HGB 13.3   HCT 39.4      MCV 92.7   MCH 31.3*   MCHC 33.8     BMP:   Recent Labs   Lab 12/15/23  0315   GLU 73*      K 2.9*   CL 99   CO2 34*   BUN 27*   CREATININE 0.42*   CALCIUM 8.1*   MG 2.8*       Significant Diagnostics:  I have reviewed all pertinent imaging results/findings within the past 24 hours.

## 2023-12-15 NOTE — PLAN OF CARE
Problem: Gas Exchange Impaired  Goal: Optimal Gas Exchange  Outcome: Ongoing, Progressing     Problem: Skin Injury Risk Increased  Goal: Skin Health and Integrity  Outcome: Ongoing, Progressing     Problem: Airway Clearance Ineffective  Goal: Effective Airway Clearance  Outcome: Ongoing, Progressing

## 2023-12-15 NOTE — PROGRESS NOTES
[facility] - [facility]  General Surgery  Progress Note    Subjective:     History of Present Illness:  No notes on file    Post-Op Info:  Procedure(s) (LRB):  LAPAROSCOPY  REPAIR, HERNIA, INGUINAL  SMALL BOWEL RESECTION (N/A)  APPENDECTOMY  CHOLANGIOGRAM (N/A)  CHOLECYSTECTOMY  LYSIS, ADHESIONS   2 Days Post-Op     Interval History: Stable, No acute events overnight. +flatus, no stool. Large amount out of NG tube; patient eating too much ice    Medications:  Continuous Infusions:   dextrose 5 % and 0.45 % NaCl with KCl 20 mEq       Scheduled Meds:   albuterol-ipratropium  3 mL Nebulization Q6H    enoxaparin  40 mg Subcutaneous Daily    ketorolac  15 mg Intravenous Q6H    methocarbamoL  1,000 mg Oral TID    mupirocin   Nasal BID    potassium chloride  20 mEq Intravenous Q2H    potassium phosphate IVPB  15 mmol Intravenous Once    QUEtiapine  50 mg Oral QHS     PRN Meds:acetaminophen, HYDROmorphone, melatonin, ondansetron, ondansetron, oxyCODONE-acetaminophen, prochlorperazine, sodium chloride 0.9%     Review of patient's allergies indicates:   Allergen Reactions    Sulfa (sulfonamide antibiotics)      Objective:     Vital Signs (Most Recent):  Temp: 97.5 °F (36.4 °C) (12/15/23 0603)  Pulse: 110 (12/15/23 0603)  Resp: 18 (12/15/23 0603)  BP: (!) 116/54 (12/15/23 0603)  SpO2: 98 % (12/15/23 0603) Vital Signs (24h Range):  Temp:  [97.5 °F (36.4 °C)-98.4 °F (36.9 °C)] 97.5 °F (36.4 °C)  Pulse:  [] 110  Resp:  [18-20] 18  SpO2:  [92 %-100 %] 98 %  BP: (101-116)/(50-58) 116/54     Weight: 42.2 kg (93 lb 0.6 oz)  Body mass index is 14.15 kg/m².    Intake/Output - Last 3 Shifts         12/13 0700  12/14 0659 12/14 0700  12/15 0659 12/15 0700  12/16 0659    I.V. (mL/kg) 50 (1.2) 2648.8 (62.8)     IV Piggyback 2400      Total Intake(mL/kg) 2450 (58.1) 2648.8 (62.8)     Urine (mL/kg/hr) 360 1500 (1.5)     Emesis/NG output 1000      Drains  4045     Other 1100      Blood 50      Total Output 2510  5545     Net -60 -2896.3                     Physical Exam  Constitutional:       General: She is not in acute distress.     Appearance: Normal appearance.   HENT:      Head: Normocephalic.   Cardiovascular:      Rate and Rhythm: Normal rate.   Pulmonary:      Effort: Pulmonary effort is normal. No respiratory distress.   Abdominal:      General: There is no distension.      Tenderness: There is no abdominal tenderness.      Comments: ATTP; midline incision C/D/I; CHEVY drain with SS drainage   Musculoskeletal:         General: Normal range of motion.   Skin:     General: Skin is warm.      Coloration: Skin is not jaundiced.   Neurological:      General: No focal deficit present.      Mental Status: She is alert and oriented to person, place, and time.      Cranial Nerves: No cranial nerve deficit.          Significant Labs:  I have reviewed all pertinent lab results within the past 24 hours.  CBC:   Recent Labs   Lab 12/15/23  0315   WBC 3.94*   RBC 4.25   HGB 13.3   HCT 39.4      MCV 92.7   MCH 31.3*   MCHC 33.8     BMP:   Recent Labs   Lab 12/15/23  0315   GLU 73*      K 2.9*   CL 99   CO2 34*   BUN 27*   CREATININE 0.42*   CALCIUM 8.1*   MG 2.8*       Significant Diagnostics:  I have reviewed all pertinent imaging results/findings within the past 24 hours.  Assessment/Plan:     S/P small bowel resection  12/14:  Status post lysis of adhesions with small bowel resection.  White count 5, afebrile.  Nanci dressing to abdomen dry and intact    12/15: Decrease ice; ok with hard candy/gum. D/C mckinnon. Replace electrolytes; awaiting more bowel function    Cholelithiasis with acute cholecystitis  12/14:  Status post cholecystectomy, femoral hernia repair small-bowel resection, lysis of adhesions appendectomy.  Abdominal incision dry and intact.  No flatus reported NPO except for ice chips.  Abdomen appropriately tender.  Afebrile, white count 5.  Creatinine 0.64 BUN 25.  BUN/creatinine ratio of 39 mild  dehydration likely from abdominal surgery.  Continue LR 75 cc an hour        Hebert Padilla, DO  General Surgery  [facility] - [address] [place] [address]

## 2023-12-15 NOTE — DISCHARGE INSTRUCTIONS
Ankle Pump        Bend ankles up and down, alternating feet.  Repeat 30 times. Do 2 sessions per day.       Quad Set        Slowly tighten muscles on thigh of right straight leg while counting out loud to 5 .   Repeat 30 times. Repeat on the left 30 times. Do 2 sessions per day.           Gluteal Sets        Squeeze pelvic floor and hold. Tighten bottom. Repeat 30 times. Do 2 sessions a day.           Heel Slide        Bend knee and pull right heel toward buttocks. Straighten out the leg.  Repeat 30 times. Repeat on the left 30 times. Do 2 sessions per day.           Hip Abduction / Adduction: with Extended Knee (Supine)        Bring right leg out to side and return to midline position. Keep knee straight.  Repeat 30 times. Repeat on the left 30 times. Do 2 sessions per day.            Straight Leg Raise        Bend left leg. Raise right leg 8-12 inches with knee locked. Exhale and tighten thigh muscles while raising leg.   Repeat 30 times. Switch legs and repeat 30 times. Do 2 sessions per day.           Bridging        Lie on back with feet shoulder width apart. Lift hips toward the ceiling. Hold 5 seconds.  Repeat 30 times. Do 2 sessions per day.         KNEE: Extension, Long Arc Quads - Sitting        Raise right foot until knee is straight. Return foot to the floor.  Repeat 30 times. Repeat on the left 30 times. Do 2 sessions per day.       Copyright © [employer]. All rights reserved.

## 2023-12-15 NOTE — PT/OT/SLP EVAL
"Physical Therapy Evaluation and Treatment    Patient Name: Neyda Claire   MRN: [id number]  Recent Surgery: Procedure(s) (LRB):  LAPAROSCOPY  REPAIR, HERNIA, INGUINAL  SMALL BOWEL RESECTION (N/A)  APPENDECTOMY  CHOLANGIOGRAM (N/A)  CHOLECYSTECTOMY  LYSIS, ADHESIONS 2 Days Post-Op    Recommendations:     Discharge Recommendations: Low Intensity Therapy   Discharge Equipment Recommendations: walker, rolling   Barriers to discharge: Increased level of assist, Decreased caregiver support, and Ongoing medical treatment    Assessment:     Neyda Claire is a 63 y.o. female admitted with a medical diagnosis of S/P small bowel resection. She presents with the following impairments/functional limitations: weakness, impaired endurance, gait instability, impaired functional mobility, pain.     Rehab Prognosis: Good; patient would benefit from acute PT services to address these deficits and reach maximum level of function.    Plan:     During this hospitalization, patient to be seen 5 x/week to address the above listed problems via gait training, therapeutic activities, therapeutic exercises    Plan of Care Expires: 01/15/24    Subjective     Chief Complaint: S/P small bowel resection   Patient Comments/Goals: "I want to get up"  Pain/Comfort:  Pain Rating 1: 3/10  Location 1: abdomen  Pain Addressed 1: Pre-medicate for activity, Reposition    Social History:  Living Environment: Patient lives alone in a mobile home with number of outside stair(s): 4 with rail  Prior Level of Function: Prior to admission, patient was independent and driving and working  Equipment Used at Home: none  DME owned (not currently used):  crutches  Assistance Upon Discharge:  [facility]    Objective:     Communicated with CHIQUI Wilhelm RN prior to session. Patient found HOB elevated with peripheral IV, telemetry, CHEVY drain, NG tube (EVELINE dressing) upon PT entry to room.    General Precautions: Standard, fall   Orthopedic Precautions: N/A   Braces: N/A  "   Respiratory Status: Room air    Exams:  Cognition: Patient is oriented to Person, Place, Time, Situation  RLE ROM: WFL  RLE Strength: WFL  LLE ROM: WFL  LLE Strength: WFL  Sensation:    -       Intact    Functional Mobility:  Gait belt applied - No  Bed Mobility  Supine to Sit: contact guard assistance and minimum assistance for trunk management  Transfers  Sit to Stand: contact guard assistance and minimum assistance with hand-held assist and with cues for hand placement and foot placement  Gait  Patient ambulated short steps at EOB, approx 5ft with hand-held assist and minimum assistance. Patient demonstrates occasional unsteady gait, decreased step length, wide base of support, flexed posture, decreased jovon, and antalgic gait. . All lines remained intact throughout ambulation trail.  Balance  Sitting: supervision  Standing: contact guard assistance      Therapeutic Activities and Exercises:   Patient educated on role of acute care PT and PT POC, safety while in hospital including calling nurse for mobility, and call light usage  Patient performed 2 set(s) of 10 repetitions of the following standing exercises: marches, mini-squats, and heel raises for bilateral LE. Patient required skilled PT for instruction of exercises and appropriate cues to perform exercises safely and appropriately.  Patient educated about importance of OOB mobility and remaining up in chair most of the day.  Patient educated about pursed lip breathing technique and cued for use with mobility.      AM-PAC 6 CLICK MOBILITY  Total Score:18    Patient left up in chair with all lines intact, call button in reach, and RN notified.    GOALS:   Multidisciplinary Problems       Physical Therapy Goals          Problem: Physical Therapy    Goal Priority Disciplines Outcome Goal Variances Interventions   Physical Therapy Goal     PT, PT/OT Ongoing, Progressing     Description: Short Term Goals to be met by: 12/29/23    Patient will increase  functional independence with mobility by performin. Supine to sit with Modified [place]  2. Sit to stand transfer with Modified [place]  3. Bed to chair transfer with Modified [place] using Rolling Walker  4. Gait  x 100 feet with Modified [place] using Rolling Walker  5. Lower extremity exercise program x30 reps per handout, with assistance as needed  6. Negotiate 4 steps with bilat rails with SBA    Long Term Goals to be met by: 1/15/24    Pt will regain full independent functional mobility to return to prior activities of daily living.                        History:     Past Medical History:   Diagnosis Date    Bipolar disorder, unspecified     COPD (chronic obstructive pulmonary disease)     Mental disorder        Past Surgical History:   Procedure Laterality Date    APPENDECTOMY  2023    Procedure: APPENDECTOMY;  Surgeon: Hebert Busby DO;  Location: [facility] OR;  Service: General;;    CHOLANGIOGRAM N/A 2023    Procedure: CHOLANGIOGRAM;  Surgeon: Hebert Busby DO;  Location: [facility] OR;  Service: General;  Laterality: N/A;    CHOLECYSTECTOMY  2023    Procedure: CHOLECYSTECTOMY;  Surgeon: Hebert Busby DO;  Location: [facility] OR;  Service: General;;    EXCISION, SMALL INTESTINE N/A 2023    Procedure: SMALL BOWEL RESECTION;  Surgeon: Hebert Busby DO;  Location: [facility] OR;  Service: General;  Laterality: N/A;    HYSTERECTOMY      LAPAROSCOPY  2023    Procedure: LAPAROSCOPY;  Surgeon: Hebert Busby DO;  Location: [facility] OR;  Service: General;;    LYSIS OF ADHESIONS  2023    Procedure: LYSIS, ADHESIONS;  Surgeon: Hebert Busby DO;  Location: [facility] OR;  Service: General;;    REPAIR, HERNIA, INGUINAL  2023    Procedure: REPAIR, HERNIA, INGUINAL;  Surgeon: Hebert Busby DO;  Location: [facility] OR;  Service: General;;    TUBAL LIGATION         Time Tracking:     PT Received On: 12/15/23  PT Start Time:  1500  PT Stop Time: 1524  PT Total Time (min): 24 min     Billable Minutes: Evaluation 15 and Therapeutic Exercise 9    12/15/2023

## 2023-12-15 NOTE — PLAN OF CARE
Problem: Adult Inpatient Plan of Care  Goal: Plan of Care Review  Outcome: Ongoing, Progressing  Goal: Patient-Specific Goal (Individualized)  Outcome: Ongoing, Progressing  Goal: Absence of Hospital-Acquired Illness or Injury  Outcome: Ongoing, Progressing  Goal: Optimal Comfort and Wellbeing  Outcome: Ongoing, Progressing  Goal: Readiness for Transition of Care  Outcome: Ongoing, Progressing     Problem: Infection  Goal: Absence of Infection Signs and Symptoms  Outcome: Ongoing, Progressing     Problem: Fall Injury Risk  Goal: Absence of Fall and Fall-Related Injury  Outcome: Ongoing, Progressing     Problem: Gas Exchange Impaired  Goal: Optimal Gas Exchange  Outcome: Ongoing, Progressing     Problem: Skin Injury Risk Increased  Goal: Skin Health and Integrity  Outcome: Ongoing, Progressing     Problem: Airway Clearance Ineffective  Goal: Effective Airway Clearance  Outcome: Ongoing, Progressing

## 2023-12-15 NOTE — ASSESSMENT & PLAN NOTE
12/14:  Status post lysis of adhesions with small bowel resection.  White count 5, afebrile.  Nanci dressing to abdomen dry and intact    12/15: Decrease ice; ok with hard candy/gum. D/C mckinnon. Replace electrolytes; awaiting more bowel function

## 2023-12-15 NOTE — PLAN OF CARE
Problem: Physical Therapy  Goal: Physical Therapy Goal  Description: Short Term Goals to be met by: 23    Patient will increase functional independence with mobility by performin. Supine to sit with Modified [place]  2. Sit to stand transfer with Modified [place]  3. Bed to chair transfer with Modified [place] using Rolling Walker  4. Gait  x 100 feet with Modified [place] using Rolling Walker  5. Lower extremity exercise program x30 reps per handout, with assistance as needed  6. Negotiate 4 steps with bilat rails with SBA    Long Term Goals to be met by: 1/15/24    Pt will regain full independent functional mobility to return to prior activities of daily living.   Outcome: Ongoing, Progressing       PT eval completed. Please see eval note for details.

## 2023-12-16 PROBLEM — E83.39 HYPOPHOSPHATEMIA: Status: ACTIVE | Noted: 2023-12-16

## 2023-12-16 PROBLEM — E87.6 HYPOKALEMIA: Status: ACTIVE | Noted: 2023-12-16

## 2023-12-16 LAB
ANION GAP SERPL CALCULATED.3IONS-SCNC: 5 MMOL/L (ref 7–16)
BASOPHILS # BLD AUTO: 0.01 K/UL (ref 0–0.2)
BASOPHILS NFR BLD AUTO: 0.1 % (ref 0–1)
BUN SERPL-MCNC: 23 MG/DL (ref 7–18)
BUN/CREAT SERPL: 46 (ref 6–20)
CALCIUM SERPL-MCNC: 7.5 MG/DL (ref 8.5–10.1)
CHLORIDE SERPL-SCNC: 98 MMOL/L (ref 98–107)
CO2 SERPL-SCNC: 40 MMOL/L (ref 21–32)
CREAT SERPL-MCNC: 0.5 MG/DL (ref 0.55–1.02)
DIFFERENTIAL METHOD BLD: ABNORMAL
EGFR (NO RACE VARIABLE) (RUSH/TITUS): 106 ML/MIN/1.73M2
EOSINOPHIL # BLD AUTO: 0.03 K/UL (ref 0–0.5)
EOSINOPHIL NFR BLD AUTO: 0.4 % (ref 1–4)
ERYTHROCYTE [DISTWIDTH] IN BLOOD BY AUTOMATED COUNT: 13.6 % (ref 11.5–14.5)
GLUCOSE SERPL-MCNC: 116 MG/DL (ref 74–106)
HCT VFR BLD AUTO: 39.6 % (ref 38–47)
HGB BLD-MCNC: 13 G/DL (ref 12–16)
HYPOCHROMIA BLD QL SMEAR: ABNORMAL
IMM GRANULOCYTES # BLD AUTO: 0.13 K/UL (ref 0–0.04)
IMM GRANULOCYTES NFR BLD: 1.7 % (ref 0–0.4)
LYMPHOCYTES # BLD AUTO: 1.32 K/UL (ref 1–4.8)
LYMPHOCYTES NFR BLD AUTO: 17.7 % (ref 27–41)
LYMPHOCYTES NFR BLD MANUAL: 23 % (ref 27–41)
MAGNESIUM SERPL-MCNC: 2.3 MG/DL (ref 1.7–2.3)
MCH RBC QN AUTO: 30.6 PG (ref 27–31)
MCHC RBC AUTO-ENTMCNC: 32.8 G/DL (ref 32–36)
MCV RBC AUTO: 93.2 FL (ref 80–96)
METAMYELOCYTES NFR BLD MANUAL: 1 %
MONOCYTES # BLD AUTO: 0.65 K/UL (ref 0–0.8)
MONOCYTES NFR BLD AUTO: 8.7 % (ref 2–6)
MONOCYTES NFR BLD MANUAL: 10 % (ref 2–6)
MPC BLD CALC-MCNC: 10 FL (ref 9.4–12.4)
MYELOCYTES NFR BLD MANUAL: 1 %
NEUTROPHILS # BLD AUTO: 5.3 K/UL (ref 1.8–7.7)
NEUTROPHILS NFR BLD AUTO: 71.4 % (ref 53–65)
NEUTS BAND NFR BLD MANUAL: 45 % (ref 1–5)
NEUTS SEG NFR BLD MANUAL: 20 % (ref 50–62)
NRBC # BLD AUTO: 0 X10E3/UL
NRBC, AUTO (.00): 0 %
PHOSPHATE SERPL-MCNC: 0.9 MG/DL (ref 2.5–4.5)
PLATELET # BLD AUTO: 196 K/UL (ref 150–400)
PLATELET MORPHOLOGY: ABNORMAL
POTASSIUM SERPL-SCNC: 3.3 MMOL/L (ref 3.5–5.1)
RBC # BLD AUTO: 4.25 M/UL (ref 4.2–5.4)
REACTIVE LYMPHOCYTES: ABNORMAL
SODIUM SERPL-SCNC: 140 MMOL/L (ref 136–145)
TARGETS BLD QL SMEAR: ABNORMAL
TOXIC GRANULES BLD QL SMEAR: ABNORMAL
WBC # BLD AUTO: 7.44 K/UL (ref 4.5–11)

## 2023-12-16 PROCEDURE — 83735 ASSAY OF MAGNESIUM: CPT | Performed by: STUDENT IN AN ORGANIZED HEALTH CARE EDUCATION/TRAINING PROGRAM

## 2023-12-16 PROCEDURE — 25000003 PHARM REV CODE 250: Performed by: STUDENT IN AN ORGANIZED HEALTH CARE EDUCATION/TRAINING PROGRAM

## 2023-12-16 PROCEDURE — 84100 ASSAY OF PHOSPHORUS: CPT | Performed by: STUDENT IN AN ORGANIZED HEALTH CARE EDUCATION/TRAINING PROGRAM

## 2023-12-16 PROCEDURE — 11000001 HC ACUTE MED/SURG PRIVATE ROOM

## 2023-12-16 PROCEDURE — 85025 COMPLETE CBC W/AUTO DIFF WBC: CPT | Performed by: STUDENT IN AN ORGANIZED HEALTH CARE EDUCATION/TRAINING PROGRAM

## 2023-12-16 PROCEDURE — 99900035 HC TECH TIME PER 15 MIN (STAT)

## 2023-12-16 PROCEDURE — 80048 BASIC METABOLIC PNL TOTAL CA: CPT | Performed by: STUDENT IN AN ORGANIZED HEALTH CARE EDUCATION/TRAINING PROGRAM

## 2023-12-16 PROCEDURE — 99233 SBSQ HOSP IP/OBS HIGH 50: CPT | Mod: 24,,, | Performed by: STUDENT IN AN ORGANIZED HEALTH CARE EDUCATION/TRAINING PROGRAM

## 2023-12-16 PROCEDURE — 94761 N-INVAS EAR/PLS OXIMETRY MLT: CPT

## 2023-12-16 PROCEDURE — 94640 AIRWAY INHALATION TREATMENT: CPT

## 2023-12-16 PROCEDURE — 94664 DEMO&/EVAL PT USE INHALER: CPT

## 2023-12-16 PROCEDURE — 27000173 HC ACAPELLA DEVICE DH OR DM

## 2023-12-16 PROCEDURE — 25000242 PHARM REV CODE 250 ALT 637 W/ HCPCS: Performed by: STUDENT IN AN ORGANIZED HEALTH CARE EDUCATION/TRAINING PROGRAM

## 2023-12-16 PROCEDURE — 63600175 PHARM REV CODE 636 W HCPCS: Performed by: STUDENT IN AN ORGANIZED HEALTH CARE EDUCATION/TRAINING PROGRAM

## 2023-12-16 RX ORDER — POTASSIUM CHLORIDE 20 MEQ/1
40 TABLET, EXTENDED RELEASE ORAL ONCE
Status: DISCONTINUED | OUTPATIENT
Start: 2023-12-16 | End: 2023-12-16

## 2023-12-16 RX ORDER — KETOROLAC TROMETHAMINE 15 MG/ML
15 INJECTION, SOLUTION INTRAMUSCULAR; INTRAVENOUS EVERY 6 HOURS
Status: COMPLETED | OUTPATIENT
Start: 2023-12-16 | End: 2023-12-16

## 2023-12-16 RX ADMIN — KETOROLAC TROMETHAMINE 15 MG: 15 INJECTION, SOLUTION INTRAMUSCULAR; INTRAVENOUS at 05:12

## 2023-12-16 RX ADMIN — IPRATROPIUM BROMIDE AND ALBUTEROL SULFATE 3 ML: 2.5; .5 SOLUTION RESPIRATORY (INHALATION) at 07:12

## 2023-12-16 RX ADMIN — MUPIROCIN: 20 OINTMENT TOPICAL at 08:12

## 2023-12-16 RX ADMIN — SODIUM CHLORIDE, POTASSIUM CHLORIDE, SODIUM LACTATE AND CALCIUM CHLORIDE 1000 ML: 600; 310; 30; 20 INJECTION, SOLUTION INTRAVENOUS at 06:12

## 2023-12-16 RX ADMIN — KETOROLAC TROMETHAMINE 15 MG: 30 INJECTION, SOLUTION INTRAMUSCULAR; INTRAVENOUS at 12:12

## 2023-12-16 RX ADMIN — Medication 1000 MG: at 02:12

## 2023-12-16 RX ADMIN — Medication 1000 MG: at 08:12

## 2023-12-16 RX ADMIN — IPRATROPIUM BROMIDE AND ALBUTEROL SULFATE 3 ML: 2.5; .5 SOLUTION RESPIRATORY (INHALATION) at 12:12

## 2023-12-16 RX ADMIN — ENOXAPARIN SODIUM 40 MG: 40 INJECTION SUBCUTANEOUS at 05:12

## 2023-12-16 RX ADMIN — OXYCODONE HYDROCHLORIDE AND ACETAMINOPHEN 1 TABLET: 5; 325 TABLET ORAL at 02:12

## 2023-12-16 RX ADMIN — POTASSIUM BICARBONATE 40 MEQ: 782 TABLET, EFFERVESCENT ORAL at 02:12

## 2023-12-16 RX ADMIN — KETOROLAC TROMETHAMINE 15 MG: 15 INJECTION, SOLUTION INTRAMUSCULAR; INTRAVENOUS at 12:12

## 2023-12-16 RX ADMIN — METHOCARBAMOL 1000 MG: 500 TABLET ORAL at 08:12

## 2023-12-16 RX ADMIN — SODIUM CHLORIDE, POTASSIUM CHLORIDE, SODIUM LACTATE AND CALCIUM CHLORIDE 500 ML: 600; 310; 30; 20 INJECTION, SOLUTION INTRAVENOUS at 08:12

## 2023-12-16 RX ADMIN — POTASSIUM CHLORIDE, DEXTROSE MONOHYDRATE AND SODIUM CHLORIDE: 150; 5; 450 INJECTION, SOLUTION INTRAVENOUS at 02:12

## 2023-12-16 RX ADMIN — ONDANSETRON 4 MG: 2 INJECTION INTRAMUSCULAR; INTRAVENOUS at 12:12

## 2023-12-16 RX ADMIN — OXYCODONE HYDROCHLORIDE AND ACETAMINOPHEN 1 TABLET: 5; 325 TABLET ORAL at 08:12

## 2023-12-16 RX ADMIN — METHOCARBAMOL 1000 MG: 500 TABLET ORAL at 02:12

## 2023-12-16 RX ADMIN — ONDANSETRON 4 MG: 2 INJECTION INTRAMUSCULAR; INTRAVENOUS at 08:12

## 2023-12-16 RX ADMIN — QUETIAPINE FUMARATE 50 MG: 25 TABLET ORAL at 08:12

## 2023-12-16 NOTE — PROGRESS NOTES
12/16/23 0015 12/16/23 0539 12/16/23 0605   Vital Signs   BP (!) 97/57 (!) 96/49 (!) 97/49     Physician notified. Patient asymptomatic on assessment. 1000 cc bolus ordered

## 2023-12-16 NOTE — PROGRESS NOTES
[facility]  General Surgery  Progress Note    Subjective:     History of Present Illness:  No notes on file    Post-Op Info:  Procedure(s) (LRB):  LAPAROSCOPY  REPAIR, HERNIA, INGUINAL  SMALL BOWEL RESECTION (N/A)  APPENDECTOMY  CHOLANGIOGRAM (N/A)  CHOLECYSTECTOMY  LYSIS, ADHESIONS   3 Days Post-Op     Interval History: Stable, No acute events overnight. +flatus, +small BM.   Medications:  Continuous Infusions:   dextrose 5 % and 0.45 % NaCl with KCl 20 mEq 75 mL/hr at 12/15/23 2243     Scheduled Meds:   albuterol-ipratropium  3 mL Nebulization Q6H    enoxaparin  40 mg Subcutaneous Daily    ketorolac  15 mg Intravenous Q6H    methocarbamoL  1,000 mg Oral TID    mupirocin   Nasal BID    potassium chloride  40 mEq Oral Once    potassium phosphate (monobasic)  1,000 mg Oral BID    QUEtiapine  50 mg Oral QHS     PRN Meds:acetaminophen, HYDROmorphone, melatonin, ondansetron, ondansetron, oxyCODONE-acetaminophen, prochlorperazine, sodium chloride 0.9%     Review of patient's allergies indicates:   Allergen Reactions    Sulfa (sulfonamide antibiotics)      Objective:     Vital Signs (Most Recent):  Temp: 99 °F (37.2 °C) (12/16/23 1006)  Pulse: 105 (12/16/23 1006)  Resp: 18 (12/16/23 1006)  BP: (!) 58/42 (12/16/23 1006)  SpO2: (!) 93 % (12/16/23 1006) Vital Signs (24h Range):  Temp:  [97.5 °F (36.4 °C)-99 °F (37.2 °C)] 99 °F (37.2 °C)  Pulse:  [104-117] 105  Resp:  [16-20] 18  SpO2:  [92 %-100 %] 93 %  BP: ()/(42-57) 58/42     Weight: 42.2 kg (93 lb 0.6 oz)  Body mass index is 14.15 kg/m².    Intake/Output - Last 3 Shifts         12/14 0700  12/15 0659 12/15 0700  12/16 0659 12/16 0700 12/17 0659    I.V. (mL/kg) 2648.8 (62.8) 1607.5 (38.1)     IV Piggyback  110     Total Intake(mL/kg) 2648.8 (62.8) 1717.5 (40.7)     Urine (mL/kg/hr) 1500 (1.5) 0 (0)     Emesis/NG output       Drains 2234 2750     Other       Blood       Total Output 5585 2750     Net -2896.3 -1032.5             Urine Occurrence  14 x 1 x    Stool Occurrence   1 x            Physical Exam  Constitutional:       General: She is not in acute distress.     Appearance: Normal appearance.   HENT:      Head: Normocephalic.   Cardiovascular:      Rate and Rhythm: Normal rate.   Pulmonary:      Effort: Pulmonary effort is normal. No respiratory distress.   Abdominal:      General: There is no distension.      Tenderness: There is no abdominal tenderness.      Comments: ATTP; midline incision C/D/I; CHEVY drain with SS drainage   Musculoskeletal:         General: Normal range of motion.   Skin:     General: Skin is warm.      Coloration: Skin is not jaundiced.   Neurological:      General: No focal deficit present.      Mental Status: She is alert and oriented to person, place, and time.      Cranial Nerves: No cranial nerve deficit.          Significant Labs:  I have reviewed all pertinent lab results within the past 24 hours.  CBC:   Recent Labs   Lab 12/16/23  0558   WBC 7.44   RBC 4.25   HGB 13.0   HCT 39.6      MCV 93.2   MCH 30.6   MCHC 32.8       BMP:   Recent Labs   Lab 12/16/23  0558   *      K 3.3*   CL 98   CO2 40*   BUN 23*   CREATININE 0.50*   CALCIUM 7.5*   MG 2.3         Significant Diagnostics:  I have reviewed all pertinent imaging results/findings within the past 24 hours.  Assessment/Plan:     Hypophosphatemia  Replaced with oral medication    Hypokalemia  Replace with oral medication    S/P small bowel resection  12/14:  Status post lysis of adhesions with small bowel resection.  White count 5, afebrile.  Nanci dressing to abdomen dry and intact    12/15: Decrease ice; ok with hard candy/gum. D/C mckinnon. Replace electrolytes; awaiting more bowel function    12/16:  Clamp NG tube and we will we check at 2:00 p.m.; if less than 250 cc, DC NG tube and start clear liquid diet    Cholelithiasis with acute cholecystitis  12/14:  Status post cholecystectomy, femoral hernia repair small-bowel resection,  lysis of adhesions appendectomy.  Abdominal incision dry and intact.  No flatus reported NPO except for ice chips.  Abdomen appropriately tender.  Afebrile, white count 5.  Creatinine 0.64 BUN 25.  BUN/creatinine ratio of 39 mild dehydration likely from abdominal surgery.  Continue LR 75 cc an hour        Hebert Padilla, DO  General Surgery  [facility] - [address] [place] [address]

## 2023-12-16 NOTE — ASSESSMENT & PLAN NOTE
12/14:  Status post lysis of adhesions with small bowel resection.  White count 5, afebrile.  Nanci dressing to abdomen dry and intact    12/15: Decrease ice; ok with hard candy/gum. D/C mckinnon. Replace electrolytes; awaiting more bowel function    12/16:  Clamp NG tube and we will we check at 2:00 p.m.; if less than 250 cc, DC NG tube and start clear liquid diet

## 2023-12-16 NOTE — SUBJECTIVE & OBJECTIVE
Interval History: Stable, No acute events overnight. +flatus, +small BM.   Medications:  Continuous Infusions:   dextrose 5 % and 0.45 % NaCl with KCl 20 mEq 75 mL/hr at 12/15/23 2243     Scheduled Meds:   albuterol-ipratropium  3 mL Nebulization Q6H    enoxaparin  40 mg Subcutaneous Daily    ketorolac  15 mg Intravenous Q6H    methocarbamoL  1,000 mg Oral TID    mupirocin   Nasal BID    potassium chloride  40 mEq Oral Once    potassium phosphate (monobasic)  1,000 mg Oral BID    QUEtiapine  50 mg Oral QHS     PRN Meds:acetaminophen, HYDROmorphone, melatonin, ondansetron, ondansetron, oxyCODONE-acetaminophen, prochlorperazine, sodium chloride 0.9%     Review of patient's allergies indicates:   Allergen Reactions    Sulfa (sulfonamide antibiotics)      Objective:     Vital Signs (Most Recent):  Temp: 99 °F (37.2 °C) (12/16/23 1006)  Pulse: 105 (12/16/23 1006)  Resp: 18 (12/16/23 1006)  BP: (!) 58/42 (12/16/23 1006)  SpO2: (!) 93 % (12/16/23 1006) Vital Signs (24h Range):  Temp:  [97.5 °F (36.4 °C)-99 °F (37.2 °C)] 99 °F (37.2 °C)  Pulse:  [104-117] 105  Resp:  [16-20] 18  SpO2:  [92 %-100 %] 93 %  BP: ()/(42-57) 58/42     Weight: 42.2 kg (93 lb 0.6 oz)  Body mass index is 14.15 kg/m².    Intake/Output - Last 3 Shifts         12/14 0700  12/15 0659 12/15 0700 12/16 0659 12/16 0700  12/17 0659    I.V. (mL/kg) 2648.8 (62.8) 1607.5 (38.1)     IV Piggyback  110     Total Intake(mL/kg) 2648.8 (62.8) 1717.5 (40.7)     Urine (mL/kg/hr) 1500 (1.5) 0 (0)     Emesis/NG output       Drains 4045 2750     Other       Blood       Total Output 5533 2750     Net -2896.3 -1032.5            Urine Occurrence  14 x 1 x    Stool Occurrence   1 x             Physical Exam  Constitutional:       General: She is not in acute distress.     Appearance: Normal appearance.   HENT:      Head: Normocephalic.   Cardiovascular:      Rate and Rhythm: Normal rate.   Pulmonary:      Effort: Pulmonary effort is normal. No respiratory distress.    Abdominal:      General: There is no distension.      Tenderness: There is no abdominal tenderness.      Comments: ATTP; midline incision C/D/I; CHEVY drain with SS drainage   Musculoskeletal:         General: Normal range of motion.   Skin:     General: Skin is warm.      Coloration: Skin is not jaundiced.   Neurological:      General: No focal deficit present.      Mental Status: She is alert and oriented to person, place, and time.      Cranial Nerves: No cranial nerve deficit.          Significant Labs:  I have reviewed all pertinent lab results within the past 24 hours.  CBC:   Recent Labs   Lab 12/16/23  0558   WBC 7.44   RBC 4.25   HGB 13.0   HCT 39.6      MCV 93.2   MCH 30.6   MCHC 32.8       BMP:   Recent Labs   Lab 12/16/23  0558   *      K 3.3*   CL 98   CO2 40*   BUN 23*   CREATININE 0.50*   CALCIUM 7.5*   MG 2.3         Significant Diagnostics:  I have reviewed all pertinent imaging results/findings within the past 24 hours.

## 2023-12-17 LAB
ANION GAP SERPL CALCULATED.3IONS-SCNC: 6 MMOL/L (ref 7–16)
BASOPHILS # BLD AUTO: 0.01 K/UL (ref 0–0.2)
BASOPHILS NFR BLD AUTO: 0.1 % (ref 0–1)
BUN SERPL-MCNC: 10 MG/DL (ref 7–18)
BUN/CREAT SERPL: 27 (ref 6–20)
CALCIUM SERPL-MCNC: 7.6 MG/DL (ref 8.5–10.1)
CHLORIDE SERPL-SCNC: 101 MMOL/L (ref 98–107)
CO2 SERPL-SCNC: 35 MMOL/L (ref 21–32)
CREAT SERPL-MCNC: 0.37 MG/DL (ref 0.55–1.02)
CRENATED CELLS: ABNORMAL
DIFFERENTIAL METHOD BLD: ABNORMAL
EGFR (NO RACE VARIABLE) (RUSH/TITUS): 113 ML/MIN/1.73M2
EOSINOPHIL # BLD AUTO: 0.05 K/UL (ref 0–0.5)
EOSINOPHIL NFR BLD AUTO: 0.5 % (ref 1–4)
EOSINOPHIL NFR BLD MANUAL: 1 % (ref 1–4)
ERYTHROCYTE [DISTWIDTH] IN BLOOD BY AUTOMATED COUNT: 13.8 % (ref 11.5–14.5)
GLUCOSE SERPL-MCNC: 90 MG/DL (ref 74–106)
HCT VFR BLD AUTO: 38.9 % (ref 38–47)
HGB BLD-MCNC: 13 G/DL (ref 12–16)
IMM GRANULOCYTES # BLD AUTO: 0.24 K/UL (ref 0–0.04)
IMM GRANULOCYTES NFR BLD: 2.4 % (ref 0–0.4)
LYMPHOCYTES # BLD AUTO: 1.86 K/UL (ref 1–4.8)
LYMPHOCYTES NFR BLD AUTO: 18.7 % (ref 27–41)
LYMPHOCYTES NFR BLD MANUAL: 17 % (ref 27–41)
MAGNESIUM SERPL-MCNC: 1.8 MG/DL (ref 1.7–2.3)
MCH RBC QN AUTO: 31.7 PG (ref 27–31)
MCHC RBC AUTO-ENTMCNC: 33.4 G/DL (ref 32–36)
MCV RBC AUTO: 94.9 FL (ref 80–96)
MONOCYTES # BLD AUTO: 0.78 K/UL (ref 0–0.8)
MONOCYTES NFR BLD AUTO: 7.8 % (ref 2–6)
MONOCYTES NFR BLD MANUAL: 7 % (ref 2–6)
MPC BLD CALC-MCNC: 10.7 FL (ref 9.4–12.4)
NEUTROPHILS # BLD AUTO: 7.01 K/UL (ref 1.8–7.7)
NEUTROPHILS NFR BLD AUTO: 70.5 % (ref 53–65)
NEUTS BAND NFR BLD MANUAL: 37 % (ref 1–5)
NEUTS SEG NFR BLD MANUAL: 38 % (ref 50–62)
NRBC # BLD AUTO: 0.02 X10E3/UL
NRBC, AUTO (.00): 0.2 %
PHOSPHATE SERPL-MCNC: 0.9 MG/DL (ref 2.5–4.5)
PLATELET # BLD AUTO: 184 K/UL (ref 150–400)
PLATELET MORPHOLOGY: ABNORMAL
POLYCHROMASIA BLD QL SMEAR: ABNORMAL
POTASSIUM SERPL-SCNC: 3.3 MMOL/L (ref 3.5–5.1)
RBC # BLD AUTO: 4.1 M/UL (ref 4.2–5.4)
SODIUM SERPL-SCNC: 139 MMOL/L (ref 136–145)
TOXIC GRANULES BLD QL SMEAR: ABNORMAL
WBC # BLD AUTO: 9.95 K/UL (ref 4.5–11)

## 2023-12-17 PROCEDURE — 63600175 PHARM REV CODE 636 W HCPCS: Performed by: STUDENT IN AN ORGANIZED HEALTH CARE EDUCATION/TRAINING PROGRAM

## 2023-12-17 PROCEDURE — 25000242 PHARM REV CODE 250 ALT 637 W/ HCPCS: Performed by: STUDENT IN AN ORGANIZED HEALTH CARE EDUCATION/TRAINING PROGRAM

## 2023-12-17 PROCEDURE — 99233 SBSQ HOSP IP/OBS HIGH 50: CPT | Mod: 24,,, | Performed by: STUDENT IN AN ORGANIZED HEALTH CARE EDUCATION/TRAINING PROGRAM

## 2023-12-17 PROCEDURE — 11000001 HC ACUTE MED/SURG PRIVATE ROOM

## 2023-12-17 PROCEDURE — 25000003 PHARM REV CODE 250: Performed by: STUDENT IN AN ORGANIZED HEALTH CARE EDUCATION/TRAINING PROGRAM

## 2023-12-17 PROCEDURE — 99900035 HC TECH TIME PER 15 MIN (STAT)

## 2023-12-17 PROCEDURE — 80048 BASIC METABOLIC PNL TOTAL CA: CPT | Performed by: STUDENT IN AN ORGANIZED HEALTH CARE EDUCATION/TRAINING PROGRAM

## 2023-12-17 PROCEDURE — 85025 COMPLETE CBC W/AUTO DIFF WBC: CPT | Performed by: STUDENT IN AN ORGANIZED HEALTH CARE EDUCATION/TRAINING PROGRAM

## 2023-12-17 PROCEDURE — 94664 DEMO&/EVAL PT USE INHALER: CPT

## 2023-12-17 PROCEDURE — 83735 ASSAY OF MAGNESIUM: CPT | Performed by: STUDENT IN AN ORGANIZED HEALTH CARE EDUCATION/TRAINING PROGRAM

## 2023-12-17 PROCEDURE — 94640 AIRWAY INHALATION TREATMENT: CPT

## 2023-12-17 PROCEDURE — 27000173 HC ACAPELLA DEVICE DH OR DM

## 2023-12-17 PROCEDURE — 94761 N-INVAS EAR/PLS OXIMETRY MLT: CPT

## 2023-12-17 PROCEDURE — 84100 ASSAY OF PHOSPHORUS: CPT | Performed by: STUDENT IN AN ORGANIZED HEALTH CARE EDUCATION/TRAINING PROGRAM

## 2023-12-17 RX ORDER — POTASSIUM CHLORIDE 20 MEQ/1
40 TABLET, EXTENDED RELEASE ORAL ONCE
Status: COMPLETED | OUTPATIENT
Start: 2023-12-17 | End: 2023-12-17

## 2023-12-17 RX ADMIN — HYDROMORPHONE HYDROCHLORIDE 0.5 MG: 2 INJECTION INTRAMUSCULAR; INTRAVENOUS; SUBCUTANEOUS at 08:12

## 2023-12-17 RX ADMIN — OXYCODONE HYDROCHLORIDE AND ACETAMINOPHEN 1 TABLET: 5; 325 TABLET ORAL at 03:12

## 2023-12-17 RX ADMIN — OXYCODONE HYDROCHLORIDE AND ACETAMINOPHEN 1 TABLET: 5; 325 TABLET ORAL at 02:12

## 2023-12-17 RX ADMIN — MUPIROCIN: 20 OINTMENT TOPICAL at 09:12

## 2023-12-17 RX ADMIN — HYDROMORPHONE HYDROCHLORIDE 0.5 MG: 2 INJECTION INTRAMUSCULAR; INTRAVENOUS; SUBCUTANEOUS at 12:12

## 2023-12-17 RX ADMIN — OXYCODONE HYDROCHLORIDE AND ACETAMINOPHEN 1 TABLET: 5; 325 TABLET ORAL at 09:12

## 2023-12-17 RX ADMIN — METHOCARBAMOL 1000 MG: 500 TABLET ORAL at 09:12

## 2023-12-17 RX ADMIN — METHOCARBAMOL 1000 MG: 500 TABLET ORAL at 03:12

## 2023-12-17 RX ADMIN — IPRATROPIUM BROMIDE AND ALBUTEROL SULFATE 3 ML: 2.5; .5 SOLUTION RESPIRATORY (INHALATION) at 01:12

## 2023-12-17 RX ADMIN — POTASSIUM CHLORIDE 40 MEQ: 1500 TABLET, EXTENDED RELEASE ORAL at 11:12

## 2023-12-17 RX ADMIN — IPRATROPIUM BROMIDE AND ALBUTEROL SULFATE 3 ML: 2.5; .5 SOLUTION RESPIRATORY (INHALATION) at 07:12

## 2023-12-17 RX ADMIN — IPRATROPIUM BROMIDE AND ALBUTEROL SULFATE 3 ML: 2.5; .5 SOLUTION RESPIRATORY (INHALATION) at 12:12

## 2023-12-17 RX ADMIN — QUETIAPINE FUMARATE 50 MG: 25 TABLET ORAL at 09:12

## 2023-12-17 RX ADMIN — ENOXAPARIN SODIUM 40 MG: 40 INJECTION SUBCUTANEOUS at 04:12

## 2023-12-17 RX ADMIN — POTASSIUM CHLORIDE, DEXTROSE MONOHYDRATE AND SODIUM CHLORIDE: 150; 5; 450 INJECTION, SOLUTION INTRAVENOUS at 02:12

## 2023-12-17 RX ADMIN — Medication 1000 MG: at 08:12

## 2023-12-17 RX ADMIN — METHOCARBAMOL 1000 MG: 500 TABLET ORAL at 08:12

## 2023-12-17 RX ADMIN — POTASSIUM CHLORIDE, DEXTROSE MONOHYDRATE AND SODIUM CHLORIDE: 150; 5; 450 INJECTION, SOLUTION INTRAVENOUS at 04:12

## 2023-12-17 RX ADMIN — ONDANSETRON 4 MG: 2 INJECTION INTRAMUSCULAR; INTRAVENOUS at 09:12

## 2023-12-17 RX ADMIN — Medication 1000 MG: at 09:12

## 2023-12-17 NOTE — NURSING
1400-NG tube placed back on low intermittent suction with 300 ml of output noted. MD notified with new order to keep on low intermittent suction until midnight and then clamp until MD rounds/PRN significant nausea.     1435-NG tube on hold r/t meds given through tube.     1535-NG tube placed back to low intermittent suction as ordered.     1840-BP remains low throughout shift. MD notified. See orders.     1740-EVELINE dressing changed r/t loss of suction. Pt tolerated well. Staples intact.

## 2023-12-17 NOTE — SUBJECTIVE & OBJECTIVE
Interval History: Stable, No acute events overnight. +flatus, +small BM.   Medications:  Continuous Infusions:   dextrose 5 % and 0.45 % NaCl with KCl 20 mEq 75 mL/hr at 12/17/23 0244     Scheduled Meds:   albuterol-ipratropium  3 mL Nebulization Q6H    enoxaparin  40 mg Subcutaneous Daily    methocarbamoL  1,000 mg Oral TID    mupirocin   Nasal BID    potassium phosphate (monobasic)  1,000 mg Oral BID    QUEtiapine  50 mg Oral QHS     PRN Meds:acetaminophen, HYDROmorphone, melatonin, ondansetron, ondansetron, oxyCODONE-acetaminophen, prochlorperazine, sodium chloride 0.9%     Review of patient's allergies indicates:   Allergen Reactions    Sulfa (sulfonamide antibiotics)      Objective:     Vital Signs (Most Recent):  Temp: 98.3 °F (36.8 °C) (12/17/23 1003)  Pulse: 107 (12/17/23 1003)  Resp: 18 (12/17/23 1003)  BP: (!) 106/53 (12/17/23 1003)  SpO2: (!) 93 % (12/17/23 1003) Vital Signs (24h Range):  Temp:  [98.3 °F (36.8 °C)-98.8 °F (37.1 °C)] 98.3 °F (36.8 °C)  Pulse:  [100-118] 107  Resp:  [12-20] 18  SpO2:  [89 %-94 %] 93 %  BP: ()/(44-57) 106/53     Weight: 42.2 kg (93 lb 0.6 oz)  Body mass index is 14.15 kg/m².    Intake/Output - Last 3 Shifts         12/15 0700  12/16 0659 12/16 0700  12/17 0659 12/17 0700  12/18 0659    I.V. (mL/kg) 1607.5 (38.1) 500 (11.8)     IV Piggyback 110      Total Intake(mL/kg) 1717.5 (40.7) 500 (11.8)     Urine (mL/kg/hr) 0 (0) 0 (0)     Drains 2750 870     Stool  0     Total Output 2750 870     Net -1032.5 -370            Urine Occurrence 14 x 3 x 2 x    Stool Occurrence  3 x              Physical Exam  Constitutional:       General: She is not in acute distress.     Appearance: Normal appearance.   HENT:      Head: Normocephalic.   Cardiovascular:      Rate and Rhythm: Normal rate.   Pulmonary:      Effort: Pulmonary effort is normal. No respiratory distress.   Abdominal:      General: There is no distension.      Tenderness: There is no abdominal tenderness.      Comments:  ATTP; midline incision C/D/I; CHEVY drain with SS drainage   Musculoskeletal:         General: Normal range of motion.   Skin:     General: Skin is warm.      Coloration: Skin is not jaundiced.   Neurological:      General: No focal deficit present.      Mental Status: She is alert and oriented to person, place, and time.      Cranial Nerves: No cranial nerve deficit.          Significant Labs:  I have reviewed all pertinent lab results within the past 24 hours.  CBC:   Recent Labs   Lab 12/17/23  0424   WBC 9.95   RBC 4.10*   HGB 13.0   HCT 38.9      MCV 94.9   MCH 31.7*   MCHC 33.4       BMP:   Recent Labs   Lab 12/17/23  0424   GLU 90      K 3.3*      CO2 35*   BUN 10   CREATININE 0.37*   CALCIUM 7.6*   MG 1.8         Significant Diagnostics:  I have reviewed all pertinent imaging results/findings within the past 24 hours.

## 2023-12-17 NOTE — NURSING
"Adm Dilaudid 0.5 mg IVP per c/o abdomen pain "9" on scale of 1-10 after ambulating to bedside commode.  "

## 2023-12-17 NOTE — ASSESSMENT & PLAN NOTE
12/14:  Status post lysis of adhesions with small bowel resection.  White count 5, afebrile.  Nanci dressing to abdomen dry and intact    12/15: Decrease ice; ok with hard candy/gum. D/C mckinnon. Replace electrolytes; awaiting more bowel function    12/16:  Clamp NG tube and we will we check at 2:00 p.m.; if less than 250 cc, DC NG tube and start clear liquid diet    12/17:  NG tube out, start clear liquid diet

## 2023-12-17 NOTE — PROGRESS NOTES
[facility]  General Surgery  Progress Note    Subjective:     History of Present Illness:  No notes on file    Post-Op Info:  Procedure(s) (LRB):  LAPAROSCOPY  REPAIR, HERNIA, INGUINAL  SMALL BOWEL RESECTION (N/A)  APPENDECTOMY  CHOLANGIOGRAM (N/A)  CHOLECYSTECTOMY  LYSIS, ADHESIONS   4 Days Post-Op     Interval History: Stable, No acute events overnight. +flatus, +small BM.   Medications:  Continuous Infusions:   dextrose 5 % and 0.45 % NaCl with KCl 20 mEq 75 mL/hr at 12/17/23 0244     Scheduled Meds:   albuterol-ipratropium  3 mL Nebulization Q6H    enoxaparin  40 mg Subcutaneous Daily    methocarbamoL  1,000 mg Oral TID    mupirocin   Nasal BID    potassium phosphate (monobasic)  1,000 mg Oral BID    QUEtiapine  50 mg Oral QHS     PRN Meds:acetaminophen, HYDROmorphone, melatonin, ondansetron, ondansetron, oxyCODONE-acetaminophen, prochlorperazine, sodium chloride 0.9%     Review of patient's allergies indicates:   Allergen Reactions    Sulfa (sulfonamide antibiotics)      Objective:     Vital Signs (Most Recent):  Temp: 98.3 °F (36.8 °C) (12/17/23 1003)  Pulse: 107 (12/17/23 1003)  Resp: 18 (12/17/23 1003)  BP: (!) 106/53 (12/17/23 1003)  SpO2: (!) 93 % (12/17/23 1003) Vital Signs (24h Range):  Temp:  [98.3 °F (36.8 °C)-98.8 °F (37.1 °C)] 98.3 °F (36.8 °C)  Pulse:  [100-118] 107  Resp:  [12-20] 18  SpO2:  [89 %-94 %] 93 %  BP: ()/(44-57) 106/53     Weight: 42.2 kg (93 lb 0.6 oz)  Body mass index is 14.15 kg/m².    Intake/Output - Last 3 Shifts         12/15 0700  12/16 0659 12/16 0700  12/17 0659 12/17 0700  12/18 0659    I.V. (mL/kg) 1607.5 (38.1) 500 (11.8)     IV Piggyback 110      Total Intake(mL/kg) 1717.5 (40.7) 500 (11.8)     Urine (mL/kg/hr) 0 (0) 0 (0)     Drains 2750 870     Stool  0     Total Output 2750 870     Net -1032.5 -370            Urine Occurrence 14 x 3 x 2 x    Stool Occurrence  3 x             Physical Exam  Constitutional:       General: She is  not in acute distress.     Appearance: Normal appearance.   HENT:      Head: Normocephalic.   Cardiovascular:      Rate and Rhythm: Normal rate.   Pulmonary:      Effort: Pulmonary effort is normal. No respiratory distress.   Abdominal:      General: There is no distension.      Tenderness: There is no abdominal tenderness.      Comments: ATTP; midline incision C/D/I; CHEVY drain with SS drainage   Musculoskeletal:         General: Normal range of motion.   Skin:     General: Skin is warm.      Coloration: Skin is not jaundiced.   Neurological:      General: No focal deficit present.      Mental Status: She is alert and oriented to person, place, and time.      Cranial Nerves: No cranial nerve deficit.          Significant Labs:  I have reviewed all pertinent lab results within the past 24 hours.  CBC:   Recent Labs   Lab 12/17/23 0424   WBC 9.95   RBC 4.10*   HGB 13.0   HCT 38.9      MCV 94.9   MCH 31.7*   MCHC 33.4       BMP:   Recent Labs   Lab 12/17/23 0424   GLU 90      K 3.3*      CO2 35*   BUN 10   CREATININE 0.37*   CALCIUM 7.6*   MG 1.8         Significant Diagnostics:  I have reviewed all pertinent imaging results/findings within the past 24 hours.  Assessment/Plan:     Hypophosphatemia  Replaced with oral medication    Hypokalemia  Replace with oral medication    S/P small bowel resection  12/14:  Status post lysis of adhesions with small bowel resection.  White count 5, afebrile.  Nanci dressing to abdomen dry and intact    12/15: Decrease ice; ok with hard candy/gum. D/C mckinnon. Replace electrolytes; awaiting more bowel function    12/16:  Clamp NG tube and we will we check at 2:00 p.m.; if less than 250 cc, DC NG tube and start clear liquid diet    12/17:  NG tube out, start clear liquid diet    Cholelithiasis with acute cholecystitis  12/14:  Status post cholecystectomy, femoral hernia repair small-bowel resection, lysis of adhesions appendectomy.  Abdominal incision dry and intact.   No flatus reported NPO except for ice chips.  Abdomen appropriately tender.  Afebrile, white count 5.  Creatinine 0.64 BUN 25.  BUN/creatinine ratio of 39 mild dehydration likely from abdominal surgery.  Continue LR 75 cc an hour        Hebert Padilla, DO  General Surgery  [facility] - [address] [place] [address]

## 2023-12-18 LAB
ANION GAP SERPL CALCULATED.3IONS-SCNC: 8 MMOL/L (ref 7–16)
BASOPHILS # BLD AUTO: 0 K/UL (ref 0–0.2)
BASOPHILS NFR BLD AUTO: 0 % (ref 0–1)
BUN SERPL-MCNC: 7 MG/DL (ref 7–18)
BUN/CREAT SERPL: 28 (ref 6–20)
CALCIUM SERPL-MCNC: 7.7 MG/DL (ref 8.5–10.1)
CHLORIDE SERPL-SCNC: 105 MMOL/L (ref 98–107)
CO2 SERPL-SCNC: 27 MMOL/L (ref 21–32)
CREAT SERPL-MCNC: 0.25 MG/DL (ref 0.55–1.02)
DHEA SERPL-MCNC: NORMAL
DIFFERENTIAL METHOD BLD: ABNORMAL
EGFR (NO RACE VARIABLE) (RUSH/TITUS): 125 ML/MIN/1.73M2
EOSINOPHIL # BLD AUTO: 0.03 K/UL (ref 0–0.5)
EOSINOPHIL NFR BLD AUTO: 0.3 % (ref 1–4)
ERYTHROCYTE [DISTWIDTH] IN BLOOD BY AUTOMATED COUNT: 13.8 % (ref 11.5–14.5)
ESTROGEN SERPL-MCNC: NORMAL PG/ML
GLUCOSE SERPL-MCNC: 94 MG/DL (ref 74–106)
HCT VFR BLD AUTO: 40.1 % (ref 38–47)
HGB BLD-MCNC: 13.4 G/DL (ref 12–16)
IMM GRANULOCYTES # BLD AUTO: 0.23 K/UL (ref 0–0.04)
IMM GRANULOCYTES NFR BLD: 2.4 % (ref 0–0.4)
INSULIN SERPL-ACNC: NORMAL U[IU]/ML
LAB AP GROSS DESCRIPTION: NORMAL
LAB AP LABORATORY NOTES: NORMAL
LYMPHOCYTES # BLD AUTO: 2.4 K/UL (ref 1–4.8)
LYMPHOCYTES NFR BLD AUTO: 25.3 % (ref 27–41)
LYMPHOCYTES NFR BLD MANUAL: 25 % (ref 27–41)
MAGNESIUM SERPL-MCNC: 1.7 MG/DL (ref 1.7–2.3)
MCH RBC QN AUTO: 31.3 PG (ref 27–31)
MCHC RBC AUTO-ENTMCNC: 33.4 G/DL (ref 32–36)
MCV RBC AUTO: 93.7 FL (ref 80–96)
MONOCYTES # BLD AUTO: 0.9 K/UL (ref 0–0.8)
MONOCYTES NFR BLD AUTO: 9.5 % (ref 2–6)
MONOCYTES NFR BLD MANUAL: 8 % (ref 2–6)
MPC BLD CALC-MCNC: 9.9 FL (ref 9.4–12.4)
NEUTROPHILS # BLD AUTO: 5.92 K/UL (ref 1.8–7.7)
NEUTROPHILS NFR BLD AUTO: 62.5 % (ref 53–65)
NEUTS BAND NFR BLD MANUAL: 4 % (ref 1–5)
NEUTS SEG NFR BLD MANUAL: 63 % (ref 50–62)
NRBC # BLD AUTO: 0 X10E3/UL
NRBC, AUTO (.00): 0 %
PHOSPHATE SERPL-MCNC: 1.5 MG/DL (ref 2.5–4.5)
PLATELET # BLD AUTO: 213 K/UL (ref 150–400)
PLATELET MORPHOLOGY: NORMAL
POTASSIUM SERPL-SCNC: 3.8 MMOL/L (ref 3.5–5.1)
RBC # BLD AUTO: 4.28 M/UL (ref 4.2–5.4)
RBC MORPH BLD: NORMAL
REACTIVE LYMPHOCYTES: ABNORMAL
SMUDGE CELLS BLD QL SMEAR: ABNORMAL
SODIUM SERPL-SCNC: 136 MMOL/L (ref 136–145)
T3RU NFR SERPL: NORMAL %
TOXIC GRANULES BLD QL SMEAR: ABNORMAL
WBC # BLD AUTO: 9.48 K/UL (ref 4.5–11)

## 2023-12-18 PROCEDURE — 11000001 HC ACUTE MED/SURG PRIVATE ROOM

## 2023-12-18 PROCEDURE — 25000242 PHARM REV CODE 250 ALT 637 W/ HCPCS: Performed by: STUDENT IN AN ORGANIZED HEALTH CARE EDUCATION/TRAINING PROGRAM

## 2023-12-18 PROCEDURE — 63600175 PHARM REV CODE 636 W HCPCS: Performed by: STUDENT IN AN ORGANIZED HEALTH CARE EDUCATION/TRAINING PROGRAM

## 2023-12-18 PROCEDURE — 25000003 PHARM REV CODE 250: Performed by: STUDENT IN AN ORGANIZED HEALTH CARE EDUCATION/TRAINING PROGRAM

## 2023-12-18 PROCEDURE — 83735 ASSAY OF MAGNESIUM: CPT | Performed by: STUDENT IN AN ORGANIZED HEALTH CARE EDUCATION/TRAINING PROGRAM

## 2023-12-18 PROCEDURE — 97110 THERAPEUTIC EXERCISES: CPT | Mod: CQ

## 2023-12-18 PROCEDURE — 84100 ASSAY OF PHOSPHORUS: CPT | Performed by: STUDENT IN AN ORGANIZED HEALTH CARE EDUCATION/TRAINING PROGRAM

## 2023-12-18 PROCEDURE — 99900035 HC TECH TIME PER 15 MIN (STAT)

## 2023-12-18 PROCEDURE — 27000173 HC ACAPELLA DEVICE DH OR DM

## 2023-12-18 PROCEDURE — 80048 BASIC METABOLIC PNL TOTAL CA: CPT | Performed by: STUDENT IN AN ORGANIZED HEALTH CARE EDUCATION/TRAINING PROGRAM

## 2023-12-18 PROCEDURE — 94664 DEMO&/EVAL PT USE INHALER: CPT

## 2023-12-18 PROCEDURE — 94640 AIRWAY INHALATION TREATMENT: CPT

## 2023-12-18 PROCEDURE — 85025 COMPLETE CBC W/AUTO DIFF WBC: CPT | Performed by: STUDENT IN AN ORGANIZED HEALTH CARE EDUCATION/TRAINING PROGRAM

## 2023-12-18 PROCEDURE — 94761 N-INVAS EAR/PLS OXIMETRY MLT: CPT

## 2023-12-18 PROCEDURE — 97116 GAIT TRAINING THERAPY: CPT | Mod: CQ

## 2023-12-18 RX ADMIN — MUPIROCIN: 20 OINTMENT TOPICAL at 09:12

## 2023-12-18 RX ADMIN — IPRATROPIUM BROMIDE AND ALBUTEROL SULFATE 3 ML: 2.5; .5 SOLUTION RESPIRATORY (INHALATION) at 12:12

## 2023-12-18 RX ADMIN — IPRATROPIUM BROMIDE AND ALBUTEROL SULFATE 3 ML: 2.5; .5 SOLUTION RESPIRATORY (INHALATION) at 07:12

## 2023-12-18 RX ADMIN — ENOXAPARIN SODIUM 40 MG: 40 INJECTION SUBCUTANEOUS at 05:12

## 2023-12-18 RX ADMIN — MUPIROCIN: 20 OINTMENT TOPICAL at 08:12

## 2023-12-18 RX ADMIN — POTASSIUM CHLORIDE, DEXTROSE MONOHYDRATE AND SODIUM CHLORIDE: 150; 5; 450 INJECTION, SOLUTION INTRAVENOUS at 05:12

## 2023-12-18 RX ADMIN — METHOCARBAMOL 1000 MG: 500 TABLET ORAL at 08:12

## 2023-12-18 RX ADMIN — OXYCODONE HYDROCHLORIDE AND ACETAMINOPHEN 1 TABLET: 5; 325 TABLET ORAL at 04:12

## 2023-12-18 RX ADMIN — ONDANSETRON 4 MG: 2 INJECTION INTRAMUSCULAR; INTRAVENOUS at 12:12

## 2023-12-18 RX ADMIN — OXYCODONE HYDROCHLORIDE AND ACETAMINOPHEN 1 TABLET: 5; 325 TABLET ORAL at 12:12

## 2023-12-18 RX ADMIN — IPRATROPIUM BROMIDE AND ALBUTEROL SULFATE 3 ML: 2.5; .5 SOLUTION RESPIRATORY (INHALATION) at 01:12

## 2023-12-18 RX ADMIN — QUETIAPINE FUMARATE 50 MG: 25 TABLET ORAL at 08:12

## 2023-12-18 RX ADMIN — METHOCARBAMOL 1000 MG: 500 TABLET ORAL at 09:12

## 2023-12-18 RX ADMIN — POTASSIUM CHLORIDE, DEXTROSE MONOHYDRATE AND SODIUM CHLORIDE: 150; 5; 450 INJECTION, SOLUTION INTRAVENOUS at 04:12

## 2023-12-18 RX ADMIN — METHOCARBAMOL 1000 MG: 500 TABLET ORAL at 03:12

## 2023-12-18 RX ADMIN — OXYCODONE HYDROCHLORIDE AND ACETAMINOPHEN 1 TABLET: 5; 325 TABLET ORAL at 08:12

## 2023-12-18 RX ADMIN — PROCHLORPERAZINE EDISYLATE 5 MG: 5 INJECTION INTRAMUSCULAR; INTRAVENOUS at 04:12

## 2023-12-18 NOTE — PROGRESS NOTES
[facility]  General Surgery  Progress Note    Subjective:     History of Present Illness:  No notes on file    Post-Op Info:  Procedure(s) (LRB):  LAPAROSCOPY  REPAIR, HERNIA, INGUINAL  SMALL BOWEL RESECTION (N/A)  APPENDECTOMY  CHOLANGIOGRAM (N/A)  CHOLECYSTECTOMY  LYSIS, ADHESIONS   5 Days Post-Op     Interval History: Stable, No acute events overnight. +flatus, +large BM.   Medications:  Continuous Infusions:   dextrose 5 % and 0.45 % NaCl with KCl 20 mEq 75 mL/hr at 12/18/23 0504     Scheduled Meds:   albuterol-ipratropium  3 mL Nebulization Q6H    enoxaparin  40 mg Subcutaneous Daily    methocarbamoL  1,000 mg Oral TID    mupirocin   Nasal BID    QUEtiapine  50 mg Oral QHS     PRN Meds:acetaminophen, HYDROmorphone, melatonin, ondansetron, ondansetron, oxyCODONE-acetaminophen, prochlorperazine, sodium chloride 0.9%     Review of patient's allergies indicates:   Allergen Reactions    Sulfa (sulfonamide antibiotics)      Objective:     Vital Signs (Most Recent):  Temp: 98.3 °F (36.8 °C) (12/18/23 0610)  Pulse: 80 (12/18/23 0610)  Resp: 18 (12/18/23 0610)  BP: 114/69 (12/18/23 0610)  SpO2: 95 % (12/18/23 0610) Vital Signs (24h Range):  Temp:  [98.3 °F (36.8 °C)-98.8 °F (37.1 °C)] 98.3 °F (36.8 °C)  Pulse:  [] 80  Resp:  [16-20] 18  SpO2:  [91 %-99 %] 95 %  BP: (103-114)/(53-70) 114/69     Weight: 42.2 kg (93 lb 0.6 oz)  Body mass index is 14.15 kg/m².    Intake/Output - Last 3 Shifts         12/16 0700  12/17 0659 12/17 0700 12/18 0659 12/18 0700 12/19 0659    I.V. (mL/kg) 500 (11.8)      IV Piggyback       Total Intake(mL/kg) 500 (11.8)      Urine (mL/kg/hr) 0 (0) 500 (0.5)     Drains 870 120     Stool 0      Total Output 870 620     Net -370 -620            Urine Occurrence 3 x 2 x     Stool Occurrence 3 x              Physical Exam  Constitutional:       General: She is not in acute distress.     Appearance: Normal appearance.   HENT:      Head: Normocephalic.    Cardiovascular:      Rate and Rhythm: Normal rate.   Pulmonary:      Effort: Pulmonary effort is normal. No respiratory distress.   Abdominal:      General: There is no distension.      Tenderness: There is no abdominal tenderness.      Comments: ATTP; midline incision C/D/I; CHEVY drain with SS drainage   Musculoskeletal:         General: Normal range of motion.   Skin:     General: Skin is warm.      Coloration: Skin is not jaundiced.   Neurological:      General: No focal deficit present.      Mental Status: She is alert and oriented to person, place, and time.      Cranial Nerves: No cranial nerve deficit.          Significant Labs:  I have reviewed all pertinent lab results within the past 24 hours.  CBC:   Recent Labs   Lab 12/18/23  0359   WBC 9.48   RBC 4.28   HGB 13.4   HCT 40.1      MCV 93.7   MCH 31.3*   MCHC 33.4       BMP:   Recent Labs   Lab 12/18/23  0359   GLU 94      K 3.8      CO2 27   BUN 7   CREATININE 0.25*   CALCIUM 7.7*   MG 1.7         Significant Diagnostics:  I have reviewed all pertinent imaging results/findings within the past 24 hours.  Assessment/Plan:     Hypophosphatemia  Replaced with oral medication    Hypokalemia  Replace with oral medication    S/P small bowel resection  12/14:  Status post lysis of adhesions with small bowel resection.  White count 5, afebrile.  Nanci dressing to abdomen dry and intact    12/15: Decrease ice; ok with hard candy/gum. D/C mckinnon. Replace electrolytes; awaiting more bowel function    12/16:  Clamp NG tube and we will we check at 2:00 p.m.; if less than 250 cc, DC NG tube and start clear liquid diet    12/17:  NG tube out, start clear liquid diet    12/18: advance to full liquid diet      Cholelithiasis with acute cholecystitis  12/14:  Status post cholecystectomy, femoral hernia repair small-bowel resection, lysis of adhesions appendectomy.  Abdominal incision dry and intact.  No flatus reported NPO except for ice chips.  Abdomen  appropriately tender.  Afebrile, white count 5.  Creatinine 0.64 BUN 25.  BUN/creatinine ratio of 39 mild dehydration likely from abdominal surgery.  Continue LR 75 cc an hour        Hebert Padilla, DO  General Surgery  [facility] - [address] [place] [address]

## 2023-12-18 NOTE — SUBJECTIVE & OBJECTIVE
Interval History: Stable, No acute events overnight. +flatus, +large BM.   Medications:  Continuous Infusions:   dextrose 5 % and 0.45 % NaCl with KCl 20 mEq 75 mL/hr at 12/18/23 0504     Scheduled Meds:   albuterol-ipratropium  3 mL Nebulization Q6H    enoxaparin  40 mg Subcutaneous Daily    methocarbamoL  1,000 mg Oral TID    mupirocin   Nasal BID    QUEtiapine  50 mg Oral QHS     PRN Meds:acetaminophen, HYDROmorphone, melatonin, ondansetron, ondansetron, oxyCODONE-acetaminophen, prochlorperazine, sodium chloride 0.9%     Review of patient's allergies indicates:   Allergen Reactions    Sulfa (sulfonamide antibiotics)      Objective:     Vital Signs (Most Recent):  Temp: 98.3 °F (36.8 °C) (12/18/23 0610)  Pulse: 80 (12/18/23 0610)  Resp: 18 (12/18/23 0610)  BP: 114/69 (12/18/23 0610)  SpO2: 95 % (12/18/23 0610) Vital Signs (24h Range):  Temp:  [98.3 °F (36.8 °C)-98.8 °F (37.1 °C)] 98.3 °F (36.8 °C)  Pulse:  [] 80  Resp:  [16-20] 18  SpO2:  [91 %-99 %] 95 %  BP: (103-114)/(53-70) 114/69     Weight: 42.2 kg (93 lb 0.6 oz)  Body mass index is 14.15 kg/m².    Intake/Output - Last 3 Shifts         12/16 0700 12/17 0659 12/17 0700 12/18 0659 12/18 0700 12/19 0659    I.V. (mL/kg) 500 (11.8)      IV Piggyback       Total Intake(mL/kg) 500 (11.8)      Urine (mL/kg/hr) 0 (0) 500 (0.5)     Drains 870 120     Stool 0      Total Output 870 620     Net -370 -620            Urine Occurrence 3 x 2 x     Stool Occurrence 3 x               Physical Exam  Constitutional:       General: She is not in acute distress.     Appearance: Normal appearance.   HENT:      Head: Normocephalic.   Cardiovascular:      Rate and Rhythm: Normal rate.   Pulmonary:      Effort: Pulmonary effort is normal. No respiratory distress.   Abdominal:      General: There is no distension.      Tenderness: There is no abdominal tenderness.      Comments: ATTP; midline incision C/D/I; CHEVY drain with SS drainage   Musculoskeletal:         General: Normal  range of motion.   Skin:     General: Skin is warm.      Coloration: Skin is not jaundiced.   Neurological:      General: No focal deficit present.      Mental Status: She is alert and oriented to person, place, and time.      Cranial Nerves: No cranial nerve deficit.          Significant Labs:  I have reviewed all pertinent lab results within the past 24 hours.  CBC:   Recent Labs   Lab 12/18/23  0359   WBC 9.48   RBC 4.28   HGB 13.4   HCT 40.1      MCV 93.7   MCH 31.3*   MCHC 33.4       BMP:   Recent Labs   Lab 12/18/23  0359   GLU 94      K 3.8      CO2 27   BUN 7   CREATININE 0.25*   CALCIUM 7.7*   MG 1.7         Significant Diagnostics:  I have reviewed all pertinent imaging results/findings within the past 24 hours.

## 2023-12-18 NOTE — CHAPLAIN
Visited Pt at the request of nurse.  Pt states she is depressed.  Discussed the death of her brother within the year and having a dream about him last night.  She had a significant surgery and symptoms remind her of what her brother went through just prior to his death.      She mentioned that she would like help getting connected with a  and counselor when she went home.  She said she might call Rev Devan Rahman, her previous .  I obtained permission to contact Brother Devan and request that he visit while in the hospital.    I plan to continue to visit her while in the hospital.  I will bring her a set of of Journey Through Grief Booklets tomorrow.      Contact was made with Rev Devan Rahman and he will visit tomorrow.  Additionally he facilitates a local Grief Share group which he will tell her about tomorrow.    Pt asked for prayer which was given.  I also encouraged pt when she noticed she was in a worry mode to give a short prayer  to Segun and ask Him to take care of whatever situation she was worrying about.

## 2023-12-18 NOTE — PT/OT/SLP PROGRESS
Physical Therapy Treatment    Patient Name:  Neyda Claire   MRN:  [id number]    Recommendations:     Discharge Recommendations: Low Intensity Therapy  Discharge Equipment Recommendations: walker, rolling  Barriers to discharge:  on-going medical care    Assessment:     Neyda Claire is a 63 y.o. female admitted with a medical diagnosis of <principal problem not specified>.  She presents with the following impairments/functional limitations: weakness, impaired endurance, gait instability, impaired functional mobility, pain. Pt. Participated well with PT tx, displayed good motivation.     Rehab Prognosis: Good; patient would benefit from acute skilled PT services to address these deficits and reach maximum level of function.    Recent Surgery: Procedure(s) (LRB):  LAPAROSCOPY  REPAIR, HERNIA, INGUINAL  SMALL BOWEL RESECTION (N/A)  APPENDECTOMY  CHOLANGIOGRAM (N/A)  CHOLECYSTECTOMY  LYSIS, ADHESIONS 5 Days Post-Op    Plan:     During this hospitalization, patient to be seen 5 x/week to address the identified rehab impairments via gait training, therapeutic activities, therapeutic exercises and progress toward the following goals:    Plan of Care Expires:  01/15/24    Subjective     Chief Complaint: abdomen and LE soreness  Patient/Family Comments/goals: Pt. States she feels good getting up (with assist).  Pain/Comfort:  Location 1: abdomen  Pain Addressed 1: Pre-medicate for activity      Objective:     Communicated with Nursing and Smokey Raudel PT  prior to session.  Patient found HOB elevated with peripheral IV, telemetry, CHEVY drain, NG tube upon PT entry to room.     General Precautions: Standard, fall  Orthopedic Precautions: N/A  Braces: N/A  Respiratory Status: Room air     Functional Mobility:  Bed Mobility:     Scooting: minimum assistance  Supine to Sit: minimum assistance  Sit to Supine: contact guard assistance  Transfers:     Sit to Stand:  minimum assistance with hand-held assist  Gait: Pt. Amb. With CGA  x 55 feet with good balance      AM-PAC 6 CLICK MOBILITY  Turning over in bed (including adjusting bedclothes, sheets and blankets)?: 3  Sitting down on and standing up from a chair with arms (e.g., wheelchair, bedside commode, etc.): 3  Moving from lying on back to sitting on the side of the bed?: 3  Moving to and from a bed to a chair (including a wheelchair)?: 3  Need to walk in hospital room?: 3  Climbing 3-5 steps with a railing?: 3  Basic Mobility Total Score: 18       Treatment & Education:  Pt. Participated in mobility per above and Standing Marching, HS curls and Heel raises x 10 reps each bilateral.     Patient left HOB elevated with all lines intact and call button in reach..    GOALS:   Multidisciplinary Problems       Physical Therapy Goals          Problem: Physical Therapy    Goal Priority Disciplines Outcome Goal Variances Interventions   Physical Therapy Goal     PT, PT/OT Ongoing, Progressing     Description: Short Term Goals to be met by: 23    Patient will increase functional independence with mobility by performin. Supine to sit with Modified [place]  2. Sit to stand transfer with Modified [place]  3. Bed to chair transfer with Modified [place] using Rolling Walker  4. Gait  x 100 feet with Modified [place] using Rolling Walker  5. Lower extremity exercise program x30 reps per handout, with assistance as needed  6. Negotiate 4 steps with bilat rails with SBA    Long Term Goals to be met by: 1/15/24    Pt will regain full independent functional mobility to return to prior activities of daily living.                        Time Tracking:     PT Received On: 23  PT Start Time: 1506     PT Stop Time: 1545  PT Total Time (min): 39 min     Billable Minutes: Gait Training 15 and Therapeutic Exercises 15    Treatment Type: Treatment  PT/PTA: PTA     Number of PTA visits since last PT visit: 2023

## 2023-12-18 NOTE — ASSESSMENT & PLAN NOTE
12/14:  Status post lysis of adhesions with small bowel resection.  White count 5, afebrile.  Nanci dressing to abdomen dry and intact    12/15: Decrease ice; ok with hard candy/gum. D/C mckinnon. Replace electrolytes; awaiting more bowel function    12/16:  Clamp NG tube and we will we check at 2:00 p.m.; if less than 250 cc, DC NG tube and start clear liquid diet    12/17:  NG tube out, start clear liquid diet    12/18: advance to full liquid diet

## 2023-12-19 LAB
ANION GAP SERPL CALCULATED.3IONS-SCNC: 9 MMOL/L (ref 7–16)
BASOPHILS # BLD AUTO: 0.1 K/UL (ref 0–0.2)
BASOPHILS NFR BLD AUTO: 0.9 % (ref 0–1)
BUN SERPL-MCNC: 6 MG/DL (ref 7–18)
BUN/CREAT SERPL: 18 (ref 6–20)
CALCIUM SERPL-MCNC: 8.1 MG/DL (ref 8.5–10.1)
CHLORIDE SERPL-SCNC: 105 MMOL/L (ref 98–107)
CO2 SERPL-SCNC: 29 MMOL/L (ref 21–32)
CREAT SERPL-MCNC: 0.33 MG/DL (ref 0.55–1.02)
DIFFERENTIAL METHOD BLD: ABNORMAL
EGFR (NO RACE VARIABLE) (RUSH/TITUS): 117 ML/MIN/1.73M2
EOSINOPHIL # BLD AUTO: 0.04 K/UL (ref 0–0.5)
EOSINOPHIL NFR BLD AUTO: 0.4 % (ref 1–4)
ERYTHROCYTE [DISTWIDTH] IN BLOOD BY AUTOMATED COUNT: 13.7 % (ref 11.5–14.5)
GLUCOSE SERPL-MCNC: 94 MG/DL (ref 74–106)
HCT VFR BLD AUTO: 39.9 % (ref 38–47)
HGB BLD-MCNC: 13.4 G/DL (ref 12–16)
IMM GRANULOCYTES # BLD AUTO: 0.24 K/UL (ref 0–0.04)
IMM GRANULOCYTES NFR BLD: 2.2 % (ref 0–0.4)
LYMPHOCYTES # BLD AUTO: 2.26 K/UL (ref 1–4.8)
LYMPHOCYTES NFR BLD AUTO: 20.8 % (ref 27–41)
MAGNESIUM SERPL-MCNC: 1.6 MG/DL (ref 1.7–2.3)
MCH RBC QN AUTO: 30.9 PG (ref 27–31)
MCHC RBC AUTO-ENTMCNC: 33.6 G/DL (ref 32–36)
MCV RBC AUTO: 92.1 FL (ref 80–96)
MONOCYTES # BLD AUTO: 1.03 K/UL (ref 0–0.8)
MONOCYTES NFR BLD AUTO: 9.5 % (ref 2–6)
MPC BLD CALC-MCNC: 9.4 FL (ref 9.4–12.4)
NEUTROPHILS # BLD AUTO: 7.2 K/UL (ref 1.8–7.7)
NEUTROPHILS NFR BLD AUTO: 66.2 % (ref 53–65)
NRBC # BLD AUTO: 0 X10E3/UL
NRBC, AUTO (.00): 0 %
PHOSPHATE SERPL-MCNC: 2.4 MG/DL (ref 2.5–4.5)
PLATELET # BLD AUTO: 256 K/UL (ref 150–400)
POTASSIUM SERPL-SCNC: 4 MMOL/L (ref 3.5–5.1)
RBC # BLD AUTO: 4.33 M/UL (ref 4.2–5.4)
SODIUM SERPL-SCNC: 139 MMOL/L (ref 136–145)
WBC # BLD AUTO: 10.87 K/UL (ref 4.5–11)

## 2023-12-19 PROCEDURE — 63600175 PHARM REV CODE 636 W HCPCS: Performed by: STUDENT IN AN ORGANIZED HEALTH CARE EDUCATION/TRAINING PROGRAM

## 2023-12-19 PROCEDURE — 25000003 PHARM REV CODE 250: Performed by: STUDENT IN AN ORGANIZED HEALTH CARE EDUCATION/TRAINING PROGRAM

## 2023-12-19 PROCEDURE — 85025 COMPLETE CBC W/AUTO DIFF WBC: CPT | Performed by: STUDENT IN AN ORGANIZED HEALTH CARE EDUCATION/TRAINING PROGRAM

## 2023-12-19 PROCEDURE — 94640 AIRWAY INHALATION TREATMENT: CPT

## 2023-12-19 PROCEDURE — 25000242 PHARM REV CODE 250 ALT 637 W/ HCPCS: Performed by: STUDENT IN AN ORGANIZED HEALTH CARE EDUCATION/TRAINING PROGRAM

## 2023-12-19 PROCEDURE — 80048 BASIC METABOLIC PNL TOTAL CA: CPT | Performed by: STUDENT IN AN ORGANIZED HEALTH CARE EDUCATION/TRAINING PROGRAM

## 2023-12-19 PROCEDURE — 11000001 HC ACUTE MED/SURG PRIVATE ROOM

## 2023-12-19 PROCEDURE — 27000173 HC ACAPELLA DEVICE DH OR DM

## 2023-12-19 PROCEDURE — 83735 ASSAY OF MAGNESIUM: CPT | Performed by: STUDENT IN AN ORGANIZED HEALTH CARE EDUCATION/TRAINING PROGRAM

## 2023-12-19 PROCEDURE — 99900035 HC TECH TIME PER 15 MIN (STAT)

## 2023-12-19 PROCEDURE — 84100 ASSAY OF PHOSPHORUS: CPT | Performed by: STUDENT IN AN ORGANIZED HEALTH CARE EDUCATION/TRAINING PROGRAM

## 2023-12-19 PROCEDURE — 94664 DEMO&/EVAL PT USE INHALER: CPT

## 2023-12-19 PROCEDURE — 97116 GAIT TRAINING THERAPY: CPT | Mod: CQ

## 2023-12-19 PROCEDURE — 94761 N-INVAS EAR/PLS OXIMETRY MLT: CPT

## 2023-12-19 RX ADMIN — IPRATROPIUM BROMIDE AND ALBUTEROL SULFATE 3 ML: 2.5; .5 SOLUTION RESPIRATORY (INHALATION) at 12:12

## 2023-12-19 RX ADMIN — OXYCODONE HYDROCHLORIDE AND ACETAMINOPHEN 1 TABLET: 5; 325 TABLET ORAL at 09:12

## 2023-12-19 RX ADMIN — METHOCARBAMOL 1000 MG: 500 TABLET ORAL at 08:12

## 2023-12-19 RX ADMIN — OXYCODONE HYDROCHLORIDE AND ACETAMINOPHEN 1 TABLET: 5; 325 TABLET ORAL at 03:12

## 2023-12-19 RX ADMIN — ONDANSETRON 4 MG: 2 INJECTION INTRAMUSCULAR; INTRAVENOUS at 12:12

## 2023-12-19 RX ADMIN — IPRATROPIUM BROMIDE AND ALBUTEROL SULFATE 3 ML: 2.5; .5 SOLUTION RESPIRATORY (INHALATION) at 07:12

## 2023-12-19 RX ADMIN — QUETIAPINE FUMARATE 50 MG: 25 TABLET ORAL at 09:12

## 2023-12-19 RX ADMIN — ENOXAPARIN SODIUM 40 MG: 40 INJECTION SUBCUTANEOUS at 05:12

## 2023-12-19 RX ADMIN — METHOCARBAMOL 1000 MG: 500 TABLET ORAL at 09:12

## 2023-12-19 RX ADMIN — POTASSIUM CHLORIDE, DEXTROSE MONOHYDRATE AND SODIUM CHLORIDE: 150; 5; 450 INJECTION, SOLUTION INTRAVENOUS at 06:12

## 2023-12-19 RX ADMIN — METHOCARBAMOL 1000 MG: 500 TABLET ORAL at 02:12

## 2023-12-19 RX ADMIN — ONDANSETRON 4 MG: 2 INJECTION INTRAMUSCULAR; INTRAVENOUS at 07:12

## 2023-12-19 NOTE — ASSESSMENT & PLAN NOTE
12/14:  Status post lysis of adhesions with small bowel resection.  White count 5, afebrile.  Nanci dressing to abdomen dry and intact    12/15: Decrease ice; ok with hard candy/gum. D/C mckinnon. Replace electrolytes; awaiting more bowel function    12/16:  Clamp NG tube and we will we check at 2:00 p.m.; if less than 250 cc, DC NG tube and start clear liquid diet    12/17:  NG tube out, start clear liquid diet    12/18: advance to full liquid diet    12/19: Advance to soft diet. +flatus/stool. Go slow with diet. CHEVY drains and penrose drain out

## 2023-12-19 NOTE — SUBJECTIVE & OBJECTIVE
Interval History: Stable, No acute events overnight. +flatus, +large BM.  Tolerating full liquids  Medications:  Continuous Infusions:      Scheduled Meds:   albuterol-ipratropium  3 mL Nebulization Q6H    enoxaparin  40 mg Subcutaneous Daily    methocarbamoL  1,000 mg Oral TID    QUEtiapine  50 mg Oral QHS     PRN Meds:acetaminophen, HYDROmorphone, melatonin, ondansetron, ondansetron, oxyCODONE-acetaminophen, prochlorperazine, sodium chloride 0.9%     Review of patient's allergies indicates:   Allergen Reactions    Sulfa (sulfonamide antibiotics)      Objective:     Vital Signs (Most Recent):  Temp: 98.4 °F (36.9 °C) (12/19/23 1007)  Pulse: 108 (12/19/23 1007)  Resp: 20 (12/19/23 1007)  BP: 110/64 (12/19/23 1007)  SpO2: (!) 92 % (12/19/23 1007) Vital Signs (24h Range):  Temp:  [98.1 °F (36.7 °C)-99.2 °F (37.3 °C)] 98.4 °F (36.9 °C)  Pulse:  [] 108  Resp:  [18-20] 20  SpO2:  [92 %-97 %] 92 %  BP: (110-128)/(62-81) 110/64     Weight: 42.2 kg (93 lb 0.6 oz)  Body mass index is 14.15 kg/m².    Intake/Output - Last 3 Shifts         12/17 0700  12/18 0659 12/18 0700  12/19 0659 12/19 0700 12/20 0659    I.V. (mL/kg)       Total Intake(mL/kg)       Urine (mL/kg/hr) 500 (0.5) 0 (0)     Drains 120 50     Stool       Total Output 620 50     Net -620 -50            Urine Occurrence 2 x 3 x              Physical Exam  Constitutional:       General: She is not in acute distress.     Appearance: Normal appearance.   HENT:      Head: Normocephalic.   Cardiovascular:      Rate and Rhythm: Normal rate.   Pulmonary:      Effort: Pulmonary effort is normal. No respiratory distress.   Abdominal:      General: There is distension.      Tenderness: There is no abdominal tenderness.      Comments: ATTP; midline incision C/D/I; CHEVY drain with SS drainage; mild distension   Musculoskeletal:         General: Normal range of motion.   Skin:     General: Skin is warm.      Coloration: Skin is not jaundiced.   Neurological:      General:  No focal deficit present.      Mental Status: She is alert and oriented to person, place, and time.      Cranial Nerves: No cranial nerve deficit.          Significant Labs:  I have reviewed all pertinent lab results within the past 24 hours.  CBC:   Recent Labs   Lab 12/19/23 0258   WBC 10.87   RBC 4.33   HGB 13.4   HCT 39.9      MCV 92.1   MCH 30.9   MCHC 33.6       BMP:   Recent Labs   Lab 12/19/23 0258   GLU 94      K 4.0      CO2 29   BUN 6*   CREATININE 0.33*   CALCIUM 8.1*   MG 1.6*         Significant Diagnostics:  I have reviewed all pertinent imaging results/findings within the past 24 hours.

## 2023-12-19 NOTE — CHAPLAIN
Brother Devan Rahman made his visit earlier this morning. Neyda said it was a good visit and he gave her some guidance with a relationship that she had already successfully used prior to my arrival.  He also advised her of the next Grief Share group.  We both encouraged her to participate.   I brought her a set of Journey through Grief Booklets.      She was in a better frame of mind during my visit even though she said she did not rest much last night.    Prayer was offered and accepted.     I will return later this afternoon to give her some items to help her process her thoughts, and occupy her time.

## 2023-12-19 NOTE — PT/OT/SLP PROGRESS
Physical Therapy Treatment    Patient Name:  Neyda Claire   MRN:  [id number]    Recommendations:     Discharge Recommendations: Low Intensity Therapy  Discharge Equipment Recommendations: walker, rolling  Barriers to discharge: None    Assessment:     Neyda Claire is a 63 y.o. female admitted with a medical diagnosis of <principal problem not specified>.  She presents with the following impairments/functional limitations: weakness, gait instability, impaired functional mobility, pain. Pt. Tolerated increased OOB activity this date.     Rehab Prognosis: Good; patient would benefit from acute skilled PT services to address these deficits and reach maximum level of function.    Recent Surgery: Procedure(s) (LRB):  LAPAROSCOPY  REPAIR, HERNIA, INGUINAL  SMALL BOWEL RESECTION (N/A)  APPENDECTOMY  CHOLANGIOGRAM (N/A)  CHOLECYSTECTOMY  LYSIS, ADHESIONS 6 Days Post-Op    Plan:     During this hospitalization, patient to be seen 5 x/week to address the identified rehab impairments via gait training, therapeutic activities, therapeutic exercises and progress toward the following goals:    Plan of Care Expires:  01/15/24    Subjective     Chief Complaint: abdominal tightness at incision  Patient/Family Comments/goals: Pt. States she is ready to go home, reports low back discomfort this PM; states mobility helped.   Pain/Comfort:  Pain Rating 1: 0/10      Objective:     Communicated with Nemesio Norman RN   prior to session.  Patient found HOB elevated with peripheral IV, telemetry, CHEVY drain, NG tube upon PT entry to room.     General Precautions: Standard, fall  Orthopedic Precautions: N/A  Braces: N/A  Respiratory Status: Room air     Functional Mobility:  Bed Mobility:     Rolling Left:  stand by assistance  Scooting: contact guard assistance and minimum assistance  Supine to Sit: minimum assistance  Sit to Supine: minimum assistance  Transfers:     Sit to Stand:  contact guard assistance and minimum assistance with  hand-held assist  Gait: Pt. Amb. With single HHA x 200 feet with decreased jovon by 25%   Balance: Instructed pt. In latreral wt. Shifts with Wide FAUZIA x 10 reps  and Ant/ Post wt. Shift (to heel raise) x 8 with bilateral HHA.       AM-PAC 6 CLICK MOBILITY  Turning over in bed (including adjusting bedclothes, sheets and blankets)?: 3  Sitting down on and standing up from a chair with arms (e.g., wheelchair, bedside commode, etc.): 3  Moving from lying on back to sitting on the side of the bed?: 3  Moving to and from a bed to a chair (including a wheelchair)?: 3  Need to walk in hospital room?: 3  Climbing 3-5 steps with a railing?: 3  Basic Mobility Total Score: 18       Treatment & Education:  Pt. Participated in mobility and gait per above and sitting wt. Shifts ant/ post x 4 reps within comfortable limits.     Patient left HOB elevated with all lines intact and call button in reach..    GOALS:   Multidisciplinary Problems       Physical Therapy Goals          Problem: Physical Therapy    Goal Priority Disciplines Outcome Goal Variances Interventions   Physical Therapy Goal     PT, PT/OT Ongoing, Progressing     Description: Short Term Goals to be met by: 23    Patient will increase functional independence with mobility by performin. Supine to sit with Modified [place]  2. Sit to stand transfer with Modified [place]  3. Bed to chair transfer with Modified [place] using Rolling Walker  4. Gait  x 100 feet with Modified [place] using Rolling Walker  5. Lower extremity exercise program x30 reps per handout, with assistance as needed  6. Negotiate 4 steps with bilat rails with SBA    Long Term Goals to be met by: 1/15/24    Pt will regain full independent functional mobility to return to prior activities of daily living.                        Time Tracking:     PT Received On: 23  PT Start Time: 1556     PT Stop Time: 1618  PT Total Time (min): 22 min     Billable Minutes: Gait  Training 22    Treatment Type: Treatment  PT/PTA: PTA     Number of PTA visits since last PT visit: 2     12/19/2023

## 2023-12-19 NOTE — PLAN OF CARE
Chart reviewed. Positive flatus and BM. Pt adv to soft diet. Drains removed. Ss following for dc needs and recommendations.

## 2023-12-19 NOTE — PROGRESS NOTES
[facility] - [facility]  General Surgery  Progress Note    Subjective:     History of Present Illness:  No notes on file    Post-Op Info:  Procedure(s) (LRB):  LAPAROSCOPY  REPAIR, HERNIA, INGUINAL  SMALL BOWEL RESECTION (N/A)  APPENDECTOMY  CHOLANGIOGRAM (N/A)  CHOLECYSTECTOMY  LYSIS, ADHESIONS   6 Days Post-Op     Interval History: Stable, No acute events overnight. +flatus, +large BM.  Tolerating full liquids  Medications:  Continuous Infusions:      Scheduled Meds:   albuterol-ipratropium  3 mL Nebulization Q6H    enoxaparin  40 mg Subcutaneous Daily    methocarbamoL  1,000 mg Oral TID    QUEtiapine  50 mg Oral QHS     PRN Meds:acetaminophen, HYDROmorphone, melatonin, ondansetron, ondansetron, oxyCODONE-acetaminophen, prochlorperazine, sodium chloride 0.9%     Review of patient's allergies indicates:   Allergen Reactions    Sulfa (sulfonamide antibiotics)      Objective:     Vital Signs (Most Recent):  Temp: 98.4 °F (36.9 °C) (12/19/23 1007)  Pulse: 108 (12/19/23 1007)  Resp: 20 (12/19/23 1007)  BP: 110/64 (12/19/23 1007)  SpO2: (!) 92 % (12/19/23 1007) Vital Signs (24h Range):  Temp:  [98.1 °F (36.7 °C)-99.2 °F (37.3 °C)] 98.4 °F (36.9 °C)  Pulse:  [] 108  Resp:  [18-20] 20  SpO2:  [92 %-97 %] 92 %  BP: (110-128)/(62-81) 110/64     Weight: 42.2 kg (93 lb 0.6 oz)  Body mass index is 14.15 kg/m².    Intake/Output - Last 3 Shifts         12/17 0700  12/18 0659 12/18 0700 12/19 0659 12/19 0700 12/20 0659    I.V. (mL/kg)       Total Intake(mL/kg)       Urine (mL/kg/hr) 500 (0.5) 0 (0)     Drains 120 50     Stool       Total Output 620 50     Net -620 -50            Urine Occurrence 2 x 3 x             Physical Exam  Constitutional:       General: She is not in acute distress.     Appearance: Normal appearance.   HENT:      Head: Normocephalic.   Cardiovascular:      Rate and Rhythm: Normal rate.   Pulmonary:      Effort: Pulmonary effort is normal. No respiratory distress.    Abdominal:      General: There is distension.      Tenderness: There is no abdominal tenderness.      Comments: ATTP; midline incision C/D/I; CHEVY drain with SS drainage; mild distension   Musculoskeletal:         General: Normal range of motion.   Skin:     General: Skin is warm.      Coloration: Skin is not jaundiced.   Neurological:      General: No focal deficit present.      Mental Status: She is alert and oriented to person, place, and time.      Cranial Nerves: No cranial nerve deficit.          Significant Labs:  I have reviewed all pertinent lab results within the past 24 hours.  CBC:   Recent Labs   Lab 12/19/23  0258   WBC 10.87   RBC 4.33   HGB 13.4   HCT 39.9      MCV 92.1   MCH 30.9   MCHC 33.6       BMP:   Recent Labs   Lab 12/19/23 0258   GLU 94      K 4.0      CO2 29   BUN 6*   CREATININE 0.33*   CALCIUM 8.1*   MG 1.6*         Significant Diagnostics:  I have reviewed all pertinent imaging results/findings within the past 24 hours.  Assessment/Plan:     Hypophosphatemia  Replaced with oral medication    Hypokalemia  Replace with oral medication    S/P small bowel resection  12/14:  Status post lysis of adhesions with small bowel resection.  White count 5, afebrile.  Nanci dressing to abdomen dry and intact    12/15: Decrease ice; ok with hard candy/gum. D/C mckinnon. Replace electrolytes; awaiting more bowel function    12/16:  Clamp NG tube and we will we check at 2:00 p.m.; if less than 250 cc, DC NG tube and start clear liquid diet    12/17:  NG tube out, start clear liquid diet    12/18: advance to full liquid diet    12/19: Advance to soft diet. +flatus/stool. Go slow with diet. CHEVY drains and penrose drain out      Cholelithiasis with acute cholecystitis  12/14:  Status post cholecystectomy, femoral hernia repair small-bowel resection, lysis of adhesions appendectomy.  Abdominal incision dry and intact.  No flatus reported NPO except for ice chips.  Abdomen appropriately tender.   Afebrile, white count 5.  Creatinine 0.64 BUN 25.  BUN/creatinine ratio of 39 mild dehydration likely from abdominal surgery.  Continue LR 75 cc an hour        Hebert Padilla, DO  General Surgery  [facility] - [address] [place] [address]

## 2023-12-20 LAB
ANION GAP SERPL CALCULATED.3IONS-SCNC: 13 MMOL/L (ref 7–16)
BASOPHILS # BLD AUTO: 0.08 K/UL (ref 0–0.2)
BASOPHILS NFR BLD AUTO: 0.7 % (ref 0–1)
BUN SERPL-MCNC: 9 MG/DL (ref 7–18)
BUN/CREAT SERPL: 32 (ref 6–20)
CALCIUM SERPL-MCNC: 7.9 MG/DL (ref 8.5–10.1)
CHLORIDE SERPL-SCNC: 103 MMOL/L (ref 98–107)
CO2 SERPL-SCNC: 24 MMOL/L (ref 21–32)
CREAT SERPL-MCNC: 0.28 MG/DL (ref 0.55–1.02)
DIFFERENTIAL METHOD BLD: ABNORMAL
EGFR (NO RACE VARIABLE) (RUSH/TITUS): 121 ML/MIN/1.73M2
EOSINOPHIL # BLD AUTO: 0.05 K/UL (ref 0–0.5)
EOSINOPHIL NFR BLD AUTO: 0.5 % (ref 1–4)
ERYTHROCYTE [DISTWIDTH] IN BLOOD BY AUTOMATED COUNT: 13.6 % (ref 11.5–14.5)
GLUCOSE SERPL-MCNC: 62 MG/DL (ref 74–106)
HCT VFR BLD AUTO: 38.6 % (ref 38–47)
HGB BLD-MCNC: 13.3 G/DL (ref 12–16)
IMM GRANULOCYTES # BLD AUTO: 0.22 K/UL (ref 0–0.04)
IMM GRANULOCYTES NFR BLD: 2 % (ref 0–0.4)
LYMPHOCYTES # BLD AUTO: 2.38 K/UL (ref 1–4.8)
LYMPHOCYTES NFR BLD AUTO: 21.7 % (ref 27–41)
MAGNESIUM SERPL-MCNC: 1.5 MG/DL (ref 1.7–2.3)
MCH RBC QN AUTO: 30.9 PG (ref 27–31)
MCHC RBC AUTO-ENTMCNC: 34.5 G/DL (ref 32–36)
MCV RBC AUTO: 89.8 FL (ref 80–96)
MONOCYTES # BLD AUTO: 1.11 K/UL (ref 0–0.8)
MONOCYTES NFR BLD AUTO: 10.1 % (ref 2–6)
MPC BLD CALC-MCNC: 9.6 FL (ref 9.4–12.4)
NEUTROPHILS # BLD AUTO: 7.11 K/UL (ref 1.8–7.7)
NEUTROPHILS NFR BLD AUTO: 65 % (ref 53–65)
NRBC # BLD AUTO: 0 X10E3/UL
NRBC, AUTO (.00): 0 %
PHOSPHATE SERPL-MCNC: 4.6 MG/DL (ref 2.5–4.5)
PLATELET # BLD AUTO: 294 K/UL (ref 150–400)
POTASSIUM SERPL-SCNC: 3.7 MMOL/L (ref 3.5–5.1)
RBC # BLD AUTO: 4.3 M/UL (ref 4.2–5.4)
SODIUM SERPL-SCNC: 136 MMOL/L (ref 136–145)
WBC # BLD AUTO: 10.95 K/UL (ref 4.5–11)

## 2023-12-20 PROCEDURE — 94640 AIRWAY INHALATION TREATMENT: CPT

## 2023-12-20 PROCEDURE — 94761 N-INVAS EAR/PLS OXIMETRY MLT: CPT

## 2023-12-20 PROCEDURE — 25000003 PHARM REV CODE 250: Performed by: STUDENT IN AN ORGANIZED HEALTH CARE EDUCATION/TRAINING PROGRAM

## 2023-12-20 PROCEDURE — 97530 THERAPEUTIC ACTIVITIES: CPT | Mod: CQ

## 2023-12-20 PROCEDURE — 97116 GAIT TRAINING THERAPY: CPT | Mod: CQ

## 2023-12-20 PROCEDURE — 94664 DEMO&/EVAL PT USE INHALER: CPT

## 2023-12-20 PROCEDURE — 25000242 PHARM REV CODE 250 ALT 637 W/ HCPCS: Performed by: STUDENT IN AN ORGANIZED HEALTH CARE EDUCATION/TRAINING PROGRAM

## 2023-12-20 PROCEDURE — 63600175 PHARM REV CODE 636 W HCPCS: Performed by: STUDENT IN AN ORGANIZED HEALTH CARE EDUCATION/TRAINING PROGRAM

## 2023-12-20 PROCEDURE — 11000001 HC ACUTE MED/SURG PRIVATE ROOM

## 2023-12-20 PROCEDURE — 83735 ASSAY OF MAGNESIUM: CPT | Performed by: STUDENT IN AN ORGANIZED HEALTH CARE EDUCATION/TRAINING PROGRAM

## 2023-12-20 PROCEDURE — 85025 COMPLETE CBC W/AUTO DIFF WBC: CPT | Performed by: STUDENT IN AN ORGANIZED HEALTH CARE EDUCATION/TRAINING PROGRAM

## 2023-12-20 PROCEDURE — 99900035 HC TECH TIME PER 15 MIN (STAT)

## 2023-12-20 PROCEDURE — 84100 ASSAY OF PHOSPHORUS: CPT | Performed by: STUDENT IN AN ORGANIZED HEALTH CARE EDUCATION/TRAINING PROGRAM

## 2023-12-20 PROCEDURE — 80048 BASIC METABOLIC PNL TOTAL CA: CPT | Performed by: STUDENT IN AN ORGANIZED HEALTH CARE EDUCATION/TRAINING PROGRAM

## 2023-12-20 PROCEDURE — 27000173 HC ACAPELLA DEVICE DH OR DM

## 2023-12-20 RX ORDER — SIMETHICONE 80 MG
1 TABLET,CHEWABLE ORAL 3 TIMES DAILY PRN
Status: DISCONTINUED | OUTPATIENT
Start: 2023-12-20 | End: 2023-12-23 | Stop reason: HOSPADM

## 2023-12-20 RX ADMIN — IPRATROPIUM BROMIDE AND ALBUTEROL SULFATE 3 ML: 2.5; .5 SOLUTION RESPIRATORY (INHALATION) at 07:12

## 2023-12-20 RX ADMIN — OXYCODONE HYDROCHLORIDE AND ACETAMINOPHEN 1 TABLET: 5; 325 TABLET ORAL at 09:12

## 2023-12-20 RX ADMIN — Medication 6 MG: at 09:12

## 2023-12-20 RX ADMIN — ENOXAPARIN SODIUM 40 MG: 40 INJECTION SUBCUTANEOUS at 04:12

## 2023-12-20 RX ADMIN — IPRATROPIUM BROMIDE AND ALBUTEROL SULFATE 3 ML: 2.5; .5 SOLUTION RESPIRATORY (INHALATION) at 01:12

## 2023-12-20 RX ADMIN — SIMETHICONE 80 MG: 80 TABLET, CHEWABLE ORAL at 04:12

## 2023-12-20 RX ADMIN — OXYCODONE HYDROCHLORIDE AND ACETAMINOPHEN 1 TABLET: 5; 325 TABLET ORAL at 03:12

## 2023-12-20 RX ADMIN — OXYCODONE HYDROCHLORIDE AND ACETAMINOPHEN 1 TABLET: 5; 325 TABLET ORAL at 11:12

## 2023-12-20 RX ADMIN — METHOCARBAMOL 1000 MG: 500 TABLET ORAL at 03:12

## 2023-12-20 RX ADMIN — METHOCARBAMOL 1000 MG: 500 TABLET ORAL at 08:12

## 2023-12-20 RX ADMIN — ONDANSETRON 4 MG: 2 INJECTION INTRAMUSCULAR; INTRAVENOUS at 06:12

## 2023-12-20 RX ADMIN — SIMETHICONE 80 MG: 80 TABLET, CHEWABLE ORAL at 09:12

## 2023-12-20 RX ADMIN — METHOCARBAMOL 1000 MG: 500 TABLET ORAL at 09:12

## 2023-12-20 RX ADMIN — QUETIAPINE FUMARATE 50 MG: 25 TABLET ORAL at 09:12

## 2023-12-20 NOTE — NURSING
Patient complains that breakfast tray was not delivered. Sent message to dietary to attempt to obtain tray for patient.

## 2023-12-20 NOTE — PROGRESS NOTES
[facility]  Adult Nutrition  First Assessment Note         Reason for Assessment  Reason For Assessment: length of stay   Nutrition Risk Screen: no indicators present    Assessment and Plan  Patient assessed for length of stay. She is a 64yo female who was admitted 12/13 with abdominal pain. She is now s/p small bowel resection.     Patient is 93 pounds with a BMI of 14.15 and is severely underweight. Chart review reveals no significant weight changes in the past six months. She did not endorse weight loss on admission but did state she has not been eating well due to n/v.     Diet was advanced to a Regular diet on 12/19. No intake has been documented per flowsheet. She is having flatus/stool. Recommend continue current diet as tolerated. Encourage good po intakes.     Last BM 12/17 per flowsheet.     Medications/labs reviewed. RD following.           Learning Needs/Social Determinants of Health  Learning Assessment       12/13/2023 1746 [facility] (12/13/2023 - Present)   Created by Marleni Thakur RN - RN (Nurse) Status: Complete                 PRIMARY LEARNER     Primary Learner Name:  Neyda Claire TT - 12/13/2023 1746    Relationship:  Patient TT - 12/13/2023 1746    Does the primary learner have any barriers to learning?:  No Barriers TT - 12/13/2023 1746    What is the preferred language of the primary learner?:  English TT - 12/13/2023 1746    Is an  required?:  No TT - 12/13/2023 1746    How does the primary learner prefer to learn new concepts?:  Listening TT - 12/13/2023 1746    How often do you need to have someone help you read instructions, pamphlets, or written material from your doctor or pharmacy?:  Never TT - 12/13/2023 1746        CO-LEARNER #1     No question answered        CO-LEARNER #2     No question answered        SPECIAL TOPICS     No question answered        ANSWERED BY:     No question answered         Edit History       Marleni Thakur, RN - RN (Nurse)   12/13/2023 0258                          Social Determinants of Health     Tobacco Use: High Risk (12/14/2023)    Patient History     Smoking Tobacco Use: Some Days     Smokeless Tobacco Use: Never     Passive Exposure: Current   Alcohol Use: Not At Risk (12/14/2023)    AUDIT-C     Frequency of Alcohol Consumption: Never     Average Number of Drinks: Patient does not drink     Frequency of Binge Drinking: Never   Financial Resource Strain: Low Risk  (12/14/2023)    Overall Financial Resource Strain (CARDIA)     Difficulty of Paying Living Expenses: Not hard at all   Food Insecurity: No Food Insecurity (12/14/2023)    Hunger Vital Sign     Worried About Running Out of Food in the Last Year: Never true     Ran Out of Food in the Last Year: Never true   Transportation Needs: No Transportation Needs (12/14/2023)    PRAPARE - Transportation     Lack of Transportation (Medical): No     Lack of Transportation (Non-Medical): No   Physical Activity: Inactive (12/14/2023)    Exercise Vital Sign     Days of Exercise per Week: 0 days     Minutes of Exercise per Session: 0 min   Stress: Stress Concern Present (12/14/2023)    English [employer] - Occupational Stress Questionnaire     Feeling of Stress : To some extent   Social Connections: Moderately Isolated (12/14/2023)    Social Connection and Isolation Panel [NHANES]     Frequency of Communication with Friends and Family: More than three times a week     Frequency of Social Gatherings with Friends and Family: More than three times a week     Attends Jain Services: 1 to 4 times per year     Active Member of Clubs or Organizations: No     Attends Club or Organization Meetings: Never     Marital Status:    Housing Stability: Low Risk  (12/14/2023)    Housing Stability Vital Sign     Unable to Pay for Housing in the Last Year: No     Number of Places Lived in the Last Year: 1      Unstable Housing in the Last Year: No   Depression: High Risk (7/5/2023)    Depression     Last PHQ-4: Flowsheet Data: 24            Malnutrition  Is Patient Malnourished: No    Skin Integrity  Larry Risk Assessment  Sensory Perception: 4-->no impairment  Moisture: 4-->rarely moist  Activity: 3-->walks occasionally  Mobility: 3-->slightly limited  Nutrition: 2-->probably inadequate  Friction and Shear: 3-->no apparent problem  Larry Score: 19      Nutrition Diagnosis  Altered GI function related to Decreased G.I. tract structural integrity as evidenced by adhesions in small bowel  Comments: s/p small bowel resection    Nutrition Risk  Level of Risk/Frequency of Follow-up: low      Recent Labs   Lab 12/20/23  0523   GLU 62*     Comments on Glucose: Glucose low     Nutrition Prescription / Recommendations  Recommendation/Intervention: Recommend continue current diet as tolerated. Encourage good po intakes.  Goals: Weight maintenance during admission, intake % of meals during admission.  Nutrition Goal Status: new  Current Diet Order: Regular  Chewing or Swallowing Difficulty?: No Chewing or swallowing difficulty  Recommended Diet: Regular  Recommended Oral Supplement: No Oral Supplements  Is Nutrition Support Recommended: [facility] Support: No  Is Nutrition Education Recommended: No    Monitor and Evaluation  % current Intake: New Diet order with no P.O. intake documented currently  % intake to meet estimated needs: 75 - 100 %  Food and Nutrient Intake: food and beverage intake  Food and Nutrient Adminstration: diet order  Anthropometric Measurements: weight, weight change  Biochemical Data, Medical Tests and Procedures: electrolyte and renal panel, gastrointestinal profile, glucose/endocrine profile, inflammatory profile, lipid profile       Current Medical Diagnosis and Past Medical History  Diagnosis: gastrointestinal disease  Past Medical History:   Diagnosis Date    Bipolar disorder,  "unspecified     COPD (chronic obstructive pulmonary disease)     Mental disorder        Nutrition/Diet History  Spiritual, Cultural Beliefs, Pentecostalism Practices, Values that Affect Care: no    Lab/Procedures/Meds  Recent Labs   Lab 12/20/23  0413 12/20/23  0523   NA  --  136   K  --  3.7   BUN  --  9   CREATININE  --  0.28*   CALCIUM  --  7.9*   CL  --  103   PHOS 4.6*  --    Note: Cr, Ca++, Ph low.     Last A1c:   Lab Results   Component Value Date    HGBA1C 5.5 07/05/2023     Lab Results   Component Value Date    RBC 4.30 12/20/2023    HGB 13.3 12/20/2023    HCT 38.6 12/20/2023    MCV 89.8 12/20/2023    MCH 30.9 12/20/2023    MCHC 34.5 12/20/2023     Pertinent Labs Reviewed: reviewed  Pertinent Medications Reviewed: reviewed  Scheduled Meds:   albuterol-ipratropium  3 mL Nebulization Q6H    enoxaparin  40 mg Subcutaneous Daily    methocarbamoL  1,000 mg Oral TID    QUEtiapine  50 mg Oral QHS     Continuous Infusions:  PRN Meds:.acetaminophen, HYDROmorphone, melatonin, ondansetron, ondansetron, oxyCODONE-acetaminophen, prochlorperazine, sodium chloride 0.9%      Anthropometrics  Temp: 97 °F (36.1 °C)  Height: 5' 7.99" (172.7 cm)  Height (inches): 67.99 in  Weight Method: Bed Scale  Weight: 42.2 kg (93 lb 0.6 oz)  Weight (lb): 93.04 lb  Ideal Body Weight (IBW), Female: 139.95 lb  % Ideal Body Weight, Female (lb): 58.59 %  BMI (Calculated): 14.1       Estimated/Assessed Needs  RMR (Hale-St. Jeor Equation): 1025.37     Temp: 97 °F (36.1 °C)Temporal  Weight Used For Calorie Calculations: 42.2 kg (93 lb)     Energy Calorie Requirements (kcal): 1476-1687kcal (35-40kcal/kg)  Weight Used For Protein Calculations: 63.5 kg (140 lb)  Protein Requirements: 64-76g (1.0-1.2g/kg ideal body weight of 140 pounds)       RDA Method (mL): 1476       Nutrition by Nursing  Diet/Nutrition Received: regular           Last Bowel Movement: 12/17/23                Nutrition Follow-Up  RD Follow-up?: Yes    Nutrition Discharge " Planning    Per CM, anticipate home with parents and [facility]. Patient will benefit from liberalized Regular diet once discharged.         Nicole Mccain MS, RD, LD  Available via Secure Chat

## 2023-12-20 NOTE — PROGRESS NOTES
[facility]  General Surgery  Progress Note    Subjective:     History of Present Illness:  No notes on file    Post-Op Info:  Procedure(s) (LRB):  LAPAROSCOPY  REPAIR, HERNIA, INGUINAL  SMALL BOWEL RESECTION (N/A)  APPENDECTOMY  CHOLANGIOGRAM (N/A)  CHOLECYSTECTOMY  LYSIS, ADHESIONS   7 Days Post-Op     Interval History: Stable, No acute events overnight. Slight flatus, no BM for 1 day.  Tolerating regular diet  Medications:  Continuous Infusions:      Scheduled Meds:   albuterol-ipratropium  3 mL Nebulization Q6H    enoxaparin  40 mg Subcutaneous Daily    methocarbamoL  1,000 mg Oral TID    QUEtiapine  50 mg Oral QHS     PRN Meds:acetaminophen, HYDROmorphone, melatonin, ondansetron, ondansetron, oxyCODONE-acetaminophen, prochlorperazine, sodium chloride 0.9%     Review of patient's allergies indicates:   Allergen Reactions    Sulfa (sulfonamide antibiotics)      Objective:     Vital Signs (Most Recent):  Temp: 97 °F (36.1 °C) (12/20/23 1000)  Pulse: (!) 115 (12/20/23 1000)  Resp: 18 (12/20/23 1125)  BP: 119/62 (12/20/23 1000)  SpO2: (!) 92 % (12/20/23 1000) Vital Signs (24h Range):  Temp:  [97 °F (36.1 °C)-98.3 °F (36.8 °C)] 97 °F (36.1 °C)  Pulse:  [100-115] 115  Resp:  [18-20] 18  SpO2:  [92 %-96 %] 92 %  BP: ()/(47-79) 119/62     Weight: 42.2 kg (93 lb 0.6 oz)  Body mass index is 14.15 kg/m².    Intake/Output - Last 3 Shifts         12/18 0700  12/19 0659 12/19 0700 12/20 0659 12/20 0700 12/21 0659    Urine (mL/kg/hr) 0 (0)      Drains 50      Total Output 50      Net -50             Urine Occurrence 3 x 2 x 1 x            Physical Exam  Constitutional:       General: She is not in acute distress.     Appearance: Normal appearance.   HENT:      Head: Normocephalic.   Cardiovascular:      Rate and Rhythm: Normal rate.   Pulmonary:      Effort: Pulmonary effort is normal. No respiratory distress.   Abdominal:      General: There is distension.      Tenderness: There is  no abdominal tenderness.      Comments: ATTP; midline incision C/D/I; CHEVY drain with SS drainage; improving distension   Musculoskeletal:         General: Normal range of motion.   Skin:     General: Skin is warm.      Coloration: Skin is not jaundiced.   Neurological:      General: No focal deficit present.      Mental Status: She is alert and oriented to person, place, and time.      Cranial Nerves: No cranial nerve deficit.          Significant Labs:  I have reviewed all pertinent lab results within the past 24 hours.  CBC:   Recent Labs   Lab 12/20/23 0413   WBC 10.95   RBC 4.30   HGB 13.3   HCT 38.6      MCV 89.8   MCH 30.9   MCHC 34.5       BMP:   Recent Labs   Lab 12/20/23 0413 12/20/23  0523   GLU  --  62*   NA  --  136   K  --  3.7   CL  --  103   CO2  --  24   BUN  --  9   CREATININE  --  0.28*   CALCIUM  --  7.9*   MG 1.5*  --          Significant Diagnostics:  I have reviewed all pertinent imaging results/findings within the past 24 hours.  Assessment/Plan:     Hypophosphatemia  Replaced with oral medication    Hypokalemia  Replace with oral medication    S/P small bowel resection  12/14:  Status post lysis of adhesions with small bowel resection.  White count 5, afebrile.  Nanci dressing to abdomen dry and intact    12/15: Decrease ice; ok with hard candy/gum. D/C mckinnon. Replace electrolytes; awaiting more bowel function    12/16:  Clamp NG tube and we will we check at 2:00 p.m.; if less than 250 cc, DC NG tube and start clear liquid diet    12/17:  NG tube out, start clear liquid diet    12/18: advance to full liquid diet    12/19: Advance to soft diet. +flatus/stool. Go slow with diet. CHEVY drains and penrose drain out    12/20: Tolerating diet. Awaiting more bowel function. Anticipate D/C home soon      Cholelithiasis with acute cholecystitis  12/14:  Status post cholecystectomy, femoral hernia repair small-bowel resection, lysis of adhesions appendectomy.  Abdominal incision dry and intact.  No  flatus reported NPO except for ice chips.  Abdomen appropriately tender.  Afebrile, white count 5.  Creatinine 0.64 BUN 25.  BUN/creatinine ratio of 39 mild dehydration likely from abdominal surgery.  Continue LR 75 cc an hour        Hebert Padilla, DO  General Surgery  [facility] - [address] [place] [address]

## 2023-12-20 NOTE — SUBJECTIVE & OBJECTIVE
Interval History: Stable, No acute events overnight. Slight flatus, no BM for 1 day.  Tolerating regular diet  Medications:  Continuous Infusions:      Scheduled Meds:   albuterol-ipratropium  3 mL Nebulization Q6H    enoxaparin  40 mg Subcutaneous Daily    methocarbamoL  1,000 mg Oral TID    QUEtiapine  50 mg Oral QHS     PRN Meds:acetaminophen, HYDROmorphone, melatonin, ondansetron, ondansetron, oxyCODONE-acetaminophen, prochlorperazine, sodium chloride 0.9%     Review of patient's allergies indicates:   Allergen Reactions    Sulfa (sulfonamide antibiotics)      Objective:     Vital Signs (Most Recent):  Temp: 97 °F (36.1 °C) (12/20/23 1000)  Pulse: (!) 115 (12/20/23 1000)  Resp: 18 (12/20/23 1125)  BP: 119/62 (12/20/23 1000)  SpO2: (!) 92 % (12/20/23 1000) Vital Signs (24h Range):  Temp:  [97 °F (36.1 °C)-98.3 °F (36.8 °C)] 97 °F (36.1 °C)  Pulse:  [100-115] 115  Resp:  [18-20] 18  SpO2:  [92 %-96 %] 92 %  BP: ()/(47-79) 119/62     Weight: 42.2 kg (93 lb 0.6 oz)  Body mass index is 14.15 kg/m².    Intake/Output - Last 3 Shifts         12/18 0700  12/19 0659 12/19 0700 12/20 0659 12/20 0700  12/21 0659    Urine (mL/kg/hr) 0 (0)      Drains 50      Total Output 50      Net -50             Urine Occurrence 3 x 2 x 1 x             Physical Exam  Constitutional:       General: She is not in acute distress.     Appearance: Normal appearance.   HENT:      Head: Normocephalic.   Cardiovascular:      Rate and Rhythm: Normal rate.   Pulmonary:      Effort: Pulmonary effort is normal. No respiratory distress.   Abdominal:      General: There is distension.      Tenderness: There is no abdominal tenderness.      Comments: ATTP; midline incision C/D/I; CHEVY drain with SS drainage; improving distension   Musculoskeletal:         General: Normal range of motion.   Skin:     General: Skin is warm.      Coloration: Skin is not jaundiced.   Neurological:      General: No focal deficit present.      Mental Status: She is alert  and oriented to person, place, and time.      Cranial Nerves: No cranial nerve deficit.          Significant Labs:  I have reviewed all pertinent lab results within the past 24 hours.  CBC:   Recent Labs   Lab 12/20/23 0413   WBC 10.95   RBC 4.30   HGB 13.3   HCT 38.6      MCV 89.8   MCH 30.9   MCHC 34.5       BMP:   Recent Labs   Lab 12/20/23 0413 12/20/23  0523   GLU  --  62*   NA  --  136   K  --  3.7   CL  --  103   CO2  --  24   BUN  --  9   CREATININE  --  0.28*   CALCIUM  --  7.9*   MG 1.5*  --          Significant Diagnostics:  I have reviewed all pertinent imaging results/findings within the past 24 hours.

## 2023-12-20 NOTE — ASSESSMENT & PLAN NOTE
12/14:  Status post lysis of adhesions with small bowel resection.  White count 5, afebrile.  Nanci dressing to abdomen dry and intact    12/15: Decrease ice; ok with hard candy/gum. D/C mckinnon. Replace electrolytes; awaiting more bowel function    12/16:  Clamp NG tube and we will we check at 2:00 p.m.; if less than 250 cc, DC NG tube and start clear liquid diet    12/17:  NG tube out, start clear liquid diet    12/18: advance to full liquid diet    12/19: Advance to soft diet. +flatus/stool. Go slow with diet. CHEVY drains and penrose drain out    12/20: Tolerating diet. Awaiting more bowel function. Anticipate D/C home soon

## 2023-12-20 NOTE — NURSING
Adm Percocet 5-235 mg PO per c/o pain to abdomen. Breakfast tray never arrived, apologized to patient for missing tray.

## 2023-12-21 PROCEDURE — 27000173 HC ACAPELLA DEVICE DH OR DM

## 2023-12-21 PROCEDURE — 25000003 PHARM REV CODE 250: Performed by: STUDENT IN AN ORGANIZED HEALTH CARE EDUCATION/TRAINING PROGRAM

## 2023-12-21 PROCEDURE — 97110 THERAPEUTIC EXERCISES: CPT | Mod: CQ

## 2023-12-21 PROCEDURE — 11000001 HC ACUTE MED/SURG PRIVATE ROOM

## 2023-12-21 PROCEDURE — 97116 GAIT TRAINING THERAPY: CPT | Mod: CQ

## 2023-12-21 PROCEDURE — 25000242 PHARM REV CODE 250 ALT 637 W/ HCPCS: Performed by: STUDENT IN AN ORGANIZED HEALTH CARE EDUCATION/TRAINING PROGRAM

## 2023-12-21 PROCEDURE — 94761 N-INVAS EAR/PLS OXIMETRY MLT: CPT

## 2023-12-21 PROCEDURE — 63600175 PHARM REV CODE 636 W HCPCS: Performed by: STUDENT IN AN ORGANIZED HEALTH CARE EDUCATION/TRAINING PROGRAM

## 2023-12-21 PROCEDURE — 94664 DEMO&/EVAL PT USE INHALER: CPT

## 2023-12-21 PROCEDURE — 99900035 HC TECH TIME PER 15 MIN (STAT)

## 2023-12-21 PROCEDURE — 94640 AIRWAY INHALATION TREATMENT: CPT

## 2023-12-21 RX ORDER — ADHESIVE BANDAGE
30 BANDAGE TOPICAL
Status: DISPENSED | OUTPATIENT
Start: 2023-12-21 | End: 2023-12-21

## 2023-12-21 RX ORDER — DOCUSATE SODIUM 100 MG/1
100 CAPSULE, LIQUID FILLED ORAL 2 TIMES DAILY
Qty: 28 CAPSULE | Refills: 0 | Status: SHIPPED | OUTPATIENT
Start: 2023-12-21

## 2023-12-21 RX ORDER — OXYCODONE AND ACETAMINOPHEN 5; 325 MG/1; MG/1
1 TABLET ORAL EVERY 6 HOURS PRN
Qty: 20 TABLET | Refills: 0 | Status: SHIPPED | OUTPATIENT
Start: 2023-12-21 | End: 2023-12-29

## 2023-12-21 RX ORDER — MUPIROCIN 20 MG/G
OINTMENT TOPICAL DAILY
Qty: 30 G | Refills: 12 | Status: SHIPPED | OUTPATIENT
Start: 2023-12-21

## 2023-12-21 RX ADMIN — ENOXAPARIN SODIUM 40 MG: 40 INJECTION SUBCUTANEOUS at 04:12

## 2023-12-21 RX ADMIN — IPRATROPIUM BROMIDE AND ALBUTEROL SULFATE 3 ML: 2.5; .5 SOLUTION RESPIRATORY (INHALATION) at 08:12

## 2023-12-21 RX ADMIN — OXYCODONE HYDROCHLORIDE AND ACETAMINOPHEN 1 TABLET: 5; 325 TABLET ORAL at 09:12

## 2023-12-21 RX ADMIN — OXYCODONE HYDROCHLORIDE AND ACETAMINOPHEN 1 TABLET: 5; 325 TABLET ORAL at 04:12

## 2023-12-21 RX ADMIN — MAGNESIUM HYDROXIDE 2400 MG: 400 SUSPENSION ORAL at 12:12

## 2023-12-21 RX ADMIN — IPRATROPIUM BROMIDE AND ALBUTEROL SULFATE 3 ML: 2.5; .5 SOLUTION RESPIRATORY (INHALATION) at 12:12

## 2023-12-21 RX ADMIN — ONDANSETRON 8 MG: 4 TABLET, ORALLY DISINTEGRATING ORAL at 01:12

## 2023-12-21 RX ADMIN — METHOCARBAMOL 1000 MG: 500 TABLET ORAL at 09:12

## 2023-12-21 RX ADMIN — OXYCODONE HYDROCHLORIDE AND ACETAMINOPHEN 1 TABLET: 5; 325 TABLET ORAL at 10:12

## 2023-12-21 RX ADMIN — HYDROMORPHONE HYDROCHLORIDE 0.5 MG: 2 INJECTION INTRAMUSCULAR; INTRAVENOUS; SUBCUTANEOUS at 08:12

## 2023-12-21 RX ADMIN — METHOCARBAMOL 1000 MG: 500 TABLET ORAL at 02:12

## 2023-12-21 RX ADMIN — Medication 6 MG: at 08:12

## 2023-12-21 RX ADMIN — METHOCARBAMOL 1000 MG: 500 TABLET ORAL at 08:12

## 2023-12-21 RX ADMIN — OXYCODONE HYDROCHLORIDE AND ACETAMINOPHEN 1 TABLET: 5; 325 TABLET ORAL at 03:12

## 2023-12-21 RX ADMIN — IPRATROPIUM BROMIDE AND ALBUTEROL SULFATE 3 ML: 2.5; .5 SOLUTION RESPIRATORY (INHALATION) at 07:12

## 2023-12-21 RX ADMIN — QUETIAPINE FUMARATE 50 MG: 25 TABLET ORAL at 08:12

## 2023-12-21 NOTE — SUBJECTIVE & OBJECTIVE
Interval History: Stable, No acute events overnight. Increased flatus, no BM x2 days  Tolerating regular diet  Medications:  Continuous Infusions:      Scheduled Meds:   albuterol-ipratropium  3 mL Nebulization Q6H    enoxaparin  40 mg Subcutaneous Daily    magnesium hydroxide 400 mg/5 ml  30 mL Oral Q2H    methocarbamoL  1,000 mg Oral TID    QUEtiapine  50 mg Oral QHS     PRN Meds:acetaminophen, HYDROmorphone, melatonin, ondansetron, ondansetron, oxyCODONE-acetaminophen, prochlorperazine, simethicone, sodium chloride 0.9%     Review of patient's allergies indicates:   Allergen Reactions    Sulfa (sulfonamide antibiotics)      Objective:     Vital Signs (Most Recent):  Temp: 97.5 °F (36.4 °C) (12/21/23 0626)  Pulse: 105 (12/21/23 0833)  Resp: 18 (12/21/23 0922)  BP: (!) 106/57 (12/21/23 0626)  SpO2: (!) 93 % (12/21/23 0833) Vital Signs (24h Range):  Temp:  [96.9 °F (36.1 °C)-98.2 °F (36.8 °C)] 97.5 °F (36.4 °C)  Pulse:  [] 105  Resp:  [16-20] 18  SpO2:  [90 %-98 %] 93 %  BP: ()/(57-65) 106/57     Weight: 42.2 kg (93 lb 0.6 oz)  Body mass index is 14.15 kg/m².    Intake/Output - Last 3 Shifts         12/19 0700 12/20 0659 12/20 0700 12/21 0659 12/21 0700 12/22 0659    P.O.  480 240    Total Intake(mL/kg)  480 (11.4) 240 (5.7)    Urine (mL/kg/hr)  2 (0)     Drains       Total Output  2     Net  +478 +240           Urine Occurrence 2 x 2 x 1 x             Physical Exam  Constitutional:       General: She is not in acute distress.     Appearance: Normal appearance.   HENT:      Head: Normocephalic.   Cardiovascular:      Rate and Rhythm: Normal rate.   Pulmonary:      Effort: Pulmonary effort is normal. No respiratory distress.   Abdominal:      General: There is distension.      Tenderness: There is no abdominal tenderness.      Comments: ATTP; midline incision C/D/I; CHEVY drain with SS drainage; improving distension   Musculoskeletal:         General: Normal range of motion.   Skin:     General: Skin is  warm.      Coloration: Skin is not jaundiced.   Neurological:      General: No focal deficit present.      Mental Status: She is alert and oriented to person, place, and time.      Cranial Nerves: No cranial nerve deficit.          Significant Labs:  I have reviewed all pertinent lab results within the past 24 hours.  CBC:   Recent Labs   Lab 12/20/23 0413   WBC 10.95   RBC 4.30   HGB 13.3   HCT 38.6      MCV 89.8   MCH 30.9   MCHC 34.5       BMP:   Recent Labs   Lab 12/20/23 0413 12/20/23  0523   GLU  --  62*   NA  --  136   K  --  3.7   CL  --  103   CO2  --  24   BUN  --  9   CREATININE  --  0.28*   CALCIUM  --  7.9*   MG 1.5*  --          Significant Diagnostics:  I have reviewed all pertinent imaging results/findings within the past 24 hours.

## 2023-12-21 NOTE — PT/OT/SLP PROGRESS
Physical Therapy Treatment    Patient Name:  Neyda Claire   MRN:  [id number]    Recommendations:     Discharge Recommendations: Low Intensity Therapy  Discharge Equipment Recommendations: walker, rolling  Barriers to discharge:  ongoing medical treatment    Assessment:     Neyda Claire is a 63 y.o. female admitted with a medical diagnosis of S/P small bowel resection.  She presents with the following impairments/functional limitations: impaired endurance.     Pt participated well with PT, able to complete all activities and expressed satisfaction with progress thus far    Rehab Prognosis: Good; patient would benefit from acute skilled PT services to address these deficits and reach maximum level of function.    Recent Surgery: Procedure(s) (LRB):  LAPAROSCOPY  REPAIR, HERNIA, INGUINAL  SMALL BOWEL RESECTION (N/A)  APPENDECTOMY  CHOLANGIOGRAM (N/A)  CHOLECYSTECTOMY  LYSIS, ADHESIONS 8 Days Post-Op    Plan:     During this hospitalization, patient to be seen 5 x/week to address the identified rehab impairments via therapeutic activities, therapeutic exercises, gait training and progress toward the following goals:    Plan of Care Expires:  01/15/24    Subjective     Chief Complaint: abdominal discomfort  Patient/Family Comments/goals: pt agreeable  Pain/Comfort:         Objective:     Communicated with Linnea Lyles RN prior to session.  Patient found HOB elevated with peripheral IV, telemetry upon PT entry to room.     General Precautions: Standard, fall  Orthopedic Precautions: N/A  Braces: N/A  Respiratory Status: Room air     Functional Mobility:  Bed Mobility:     Supine to Sit: modified independence  Transfers:     Sit to Stand:  modified independence with no AD  Gait: 248' with supervision and no AD; slow jovon, several brief standing rest breaks      AM-PAC 6 CLICK MOBILITY  Turning over in bed (including adjusting bedclothes, sheets and blankets)?: 4  Sitting down on and standing up from a chair with arms  (e.g., wheelchair, bedside commode, etc.): 4  Moving from lying on back to sitting on the side of the bed?: 4  Moving to and from a bed to a chair (including a wheelchair)?: 4  Need to walk in hospital room?: 4  Climbing 3-5 steps with a railing?: 3  Basic Mobility Total Score: 23       Treatment & Education:  Pt performed 2 x 15 reps (B) LE exercises: ap, quad set, glut set, straight leg raise, hip ab/adduction, long arc quad, heel slide with assist and rest as needed     Patient left up in chair with all lines intact and call button in reach..    GOALS:   Multidisciplinary Problems       Physical Therapy Goals          Problem: Physical Therapy    Goal Priority Disciplines Outcome Goal Variances Interventions   Physical Therapy Goal     PT, PT/OT Ongoing, Progressing     Description: Short Term Goals to be met by: 23    Patient will increase functional independence with mobility by performin. Supine to sit with Modified [place]  2. Sit to stand transfer with Modified [place]  3. Bed to chair transfer with Modified [place] using Rolling Walker  4. Gait  x 100 feet with Modified [place] using Rolling Walker  5. Lower extremity exercise program x30 reps per handout, with assistance as needed  6. Negotiate 4 steps with bilat rails with SBA    Long Term Goals to be met by: 1/15/24    Pt will regain full independent functional mobility to return to prior activities of daily living.                        Time Tracking:     PT Received On: 23  PT Start Time: 1056     PT Stop Time: 1130  PT Total Time (min): 34 min     Billable Minutes: Gait Training 8 and Therapeutic Exercise 15    Treatment Type: Treatment  PT/PTA: PTA     Number of PTA visits since last PT visit: 3     2023

## 2023-12-21 NOTE — PT/OT/SLP PROGRESS
Physical Therapy Treatment    Patient Name:  Neyda Claire   MRN:  [id number]    Recommendations:     Discharge Recommendations: Low Intensity Therapy  Discharge Equipment Recommendations: walker, rolling  Barriers to discharge:  on-going medical care    Assessment:     Neyda Claire is a 63 y.o. female admitted with a medical diagnosis of <principal problem not specified>.  She presents with the following impairments/functional limitations: weakness, gait instability, impaired functional mobility, pain. Pt. Displayed improved mobility and balance with OOB activity this date. Responded well to tx; reported feeling gas move.    Rehab Prognosis: Good; patient would benefit from acute skilled PT services to address these deficits and reach maximum level of function.    Recent Surgery: Procedure(s) (LRB):  LAPAROSCOPY  REPAIR, HERNIA, INGUINAL  SMALL BOWEL RESECTION (N/A)  APPENDECTOMY  CHOLANGIOGRAM (N/A)  CHOLECYSTECTOMY  LYSIS, ADHESIONS 7 Days Post-Op    Plan:     During this hospitalization, patient to be seen 5 x/week to address the identified rehab impairments via gait training, therapeutic activities, therapeutic exercises and progress toward the following goals:    Plan of Care Expires:  01/15/24    Subjective     Chief Complaint: gas/ bloating  Patient/Family Comments/goals: Pt. States she has been up moving more today but is still waiting for BM.   Pain/Comfort:         Objective:     Communicated with Nemesio Norman, THELMA   prior to session.  Patient found sitting edge of bed with peripheral IV, telemetry, CHEVY drain upon PT entry to room.     General Precautions: Standard, fall  Orthopedic Precautions: N/A  Braces: N/A  Respiratory Status: Room air     Functional Mobility:  Bed Mobility:     Scooting: modified independence  Sit to Supine: modified independence  Transfers:     Sit to Stand:  stand by assistance with no AD  Gait: Pt. Amb. 2 x 200 feet with Single HHA        AM-PAC 6 CLICK MOBILITY  Turning over  in bed (including adjusting bedclothes, sheets and blankets)?: 4  Sitting down on and standing up from a chair with arms (e.g., wheelchair, bedside commode, etc.): 4  Moving from lying on back to sitting on the side of the bed?: 3  Moving to and from a bed to a chair (including a wheelchair)?: 3  Need to walk in hospital room?: 3  Climbing 3-5 steps with a railing?: 3  Basic Mobility Total Score: 20       Treatment & Education:  Pt. Participated in LE PREs for balance/ mobility in Standing: Heel Raises and Mini-squats x 10 reps each.     Patient left supine with all lines intact, call button in reach, and nursing notified..    GOALS:   Multidisciplinary Problems       Physical Therapy Goals          Problem: Physical Therapy    Goal Priority Disciplines Outcome Goal Variances Interventions   Physical Therapy Goal     PT, PT/OT Ongoing, Progressing     Description: Short Term Goals to be met by: 23    Patient will increase functional independence with mobility by performin. Supine to sit with Modified [place]  2. Sit to stand transfer with Modified [place]  3. Bed to chair transfer with Modified [place] using Rolling Walker  4. Gait  x 100 feet with Modified [place] using Rolling Walker  5. Lower extremity exercise program x30 reps per handout, with assistance as needed  6. Negotiate 4 steps with bilat rails with SBA    Long Term Goals to be met by: 1/15/24    Pt will regain full independent functional mobility to return to prior activities of daily living.                        Time Tracking:     PT Received On: 23  PT Start Time: 1625     PT Stop Time: 1657  PT Total Time (min): 32 min     Billable Minutes: Gait Training 16 and Therapeutic Activity 16    Treatment Type: Treatment  PT/PTA: PTA     Number of PTA visits since last PT visit: 3     2023

## 2023-12-21 NOTE — ASSESSMENT & PLAN NOTE
12/14:  Status post lysis of adhesions with small bowel resection.  White count 5, afebrile.  Nanci dressing to abdomen dry and intact    12/15: Decrease ice; ok with hard candy/gum. D/C mckinnon. Replace electrolytes; awaiting more bowel function    12/16:  Clamp NG tube and we will we check at 2:00 p.m.; if less than 250 cc, DC NG tube and start clear liquid diet    12/17:  NG tube out, start clear liquid diet    12/18: advance to full liquid diet    12/19: Advance to soft diet. +flatus/stool. Go slow with diet. CHEVY drains and penrose drain out    12/20: Tolerating diet. Awaiting more bowel function. Anticipate D/C home soon    12/21: Will give Milk of mag to help with BM; anticipate D/C home tomorrow

## 2023-12-21 NOTE — HOSPITAL COURSE
63-year-old  female presented initially to the clinic for complaints of abdominal pain.  Found to have an incarcerated right inguinal hernia causing obstruction and also having acute calculous cholecystitis.  Patient was taken the operating room the underwent an open repair of the hernia and small bowel resection and open cholecystectomy.  Patient had a prolonged course with ileus which is resolving slowly.  Patient is tolerating diet and passing gas.  Awaiting for increased bowel movements.  Patient looks well    12/22/2023     Patient looks well but blood pressure was running low at 78 systolic we will give her a bolus of LR will started on some antibiotics she is got some mild drainage the most inferior aspect of her wound and all hold onto her another 24 hours    12-23  bp is better no diziness, home with f/u

## 2023-12-21 NOTE — PROGRESS NOTES
[facility] - [facility]  General Surgery  Progress Note    Subjective:     History of Present Illness:  No notes on file    Post-Op Info:  Procedure(s) (LRB):  LAPAROSCOPY  REPAIR, HERNIA, INGUINAL  SMALL BOWEL RESECTION (N/A)  APPENDECTOMY  CHOLANGIOGRAM (N/A)  CHOLECYSTECTOMY  LYSIS, ADHESIONS   8 Days Post-Op     Interval History: Stable, No acute events overnight. Increased flatus, no BM x2 days  Tolerating regular diet  Medications:  Continuous Infusions:      Scheduled Meds:   albuterol-ipratropium  3 mL Nebulization Q6H    enoxaparin  40 mg Subcutaneous Daily    magnesium hydroxide 400 mg/5 ml  30 mL Oral Q2H    methocarbamoL  1,000 mg Oral TID    QUEtiapine  50 mg Oral QHS     PRN Meds:acetaminophen, HYDROmorphone, melatonin, ondansetron, ondansetron, oxyCODONE-acetaminophen, prochlorperazine, simethicone, sodium chloride 0.9%     Review of patient's allergies indicates:   Allergen Reactions    Sulfa (sulfonamide antibiotics)      Objective:     Vital Signs (Most Recent):  Temp: 97.5 °F (36.4 °C) (12/21/23 0626)  Pulse: 105 (12/21/23 0833)  Resp: 18 (12/21/23 0922)  BP: (!) 106/57 (12/21/23 0626)  SpO2: (!) 93 % (12/21/23 0833) Vital Signs (24h Range):  Temp:  [96.9 °F (36.1 °C)-98.2 °F (36.8 °C)] 97.5 °F (36.4 °C)  Pulse:  [] 105  Resp:  [16-20] 18  SpO2:  [90 %-98 %] 93 %  BP: ()/(57-65) 106/57     Weight: 42.2 kg (93 lb 0.6 oz)  Body mass index is 14.15 kg/m².    Intake/Output - Last 3 Shifts         12/19 0700 12/20 0659 12/20 0700 12/21 0659 12/21 0700 12/22 0659    P.O.  480 240    Total Intake(mL/kg)  480 (11.4) 240 (5.7)    Urine (mL/kg/hr)  2 (0)     Drains       Total Output  2     Net  +478 +240           Urine Occurrence 2 x 2 x 1 x            Physical Exam  Constitutional:       General: She is not in acute distress.     Appearance: Normal appearance.   HENT:      Head: Normocephalic.   Cardiovascular:      Rate and Rhythm: Normal rate.   Pulmonary:       Effort: Pulmonary effort is normal. No respiratory distress.   Abdominal:      General: There is distension.      Tenderness: There is no abdominal tenderness.      Comments: ATTP; midline incision C/D/I; CHEVY drain with SS drainage; improving distension   Musculoskeletal:         General: Normal range of motion.   Skin:     General: Skin is warm.      Coloration: Skin is not jaundiced.   Neurological:      General: No focal deficit present.      Mental Status: She is alert and oriented to person, place, and time.      Cranial Nerves: No cranial nerve deficit.          Significant Labs:  I have reviewed all pertinent lab results within the past 24 hours.  CBC:   Recent Labs   Lab 12/20/23 0413   WBC 10.95   RBC 4.30   HGB 13.3   HCT 38.6      MCV 89.8   MCH 30.9   MCHC 34.5       BMP:   Recent Labs   Lab 12/20/23 0413 12/20/23  0523   GLU  --  62*   NA  --  136   K  --  3.7   CL  --  103   CO2  --  24   BUN  --  9   CREATININE  --  0.28*   CALCIUM  --  7.9*   MG 1.5*  --          Significant Diagnostics:  I have reviewed all pertinent imaging results/findings within the past 24 hours.  Assessment/Plan:     Hypophosphatemia  Replaced with oral medication    Hypokalemia  Replace with oral medication    S/P small bowel resection  12/14:  Status post lysis of adhesions with small bowel resection.  White count 5, afebrile.  Nanci dressing to abdomen dry and intact    12/15: Decrease ice; ok with hard candy/gum. D/C mckinnon. Replace electrolytes; awaiting more bowel function    12/16:  Clamp NG tube and we will we check at 2:00 p.m.; if less than 250 cc, DC NG tube and start clear liquid diet    12/17:  NG tube out, start clear liquid diet    12/18: advance to full liquid diet    12/19: Advance to soft diet. +flatus/stool. Go slow with diet. CHEVY drains and penrose drain out    12/20: Tolerating diet. Awaiting more bowel function. Anticipate D/C home soon    12/21: Will give Milk of mag to help with BM; anticipate D/C  home tomorrow      Cholelithiasis with acute cholecystitis  12/14:  Status post cholecystectomy, femoral hernia repair small-bowel resection, lysis of adhesions appendectomy.  Abdominal incision dry and intact.  No flatus reported NPO except for ice chips.  Abdomen appropriately tender.  Afebrile, white count 5.  Creatinine 0.64 BUN 25.  BUN/creatinine ratio of 39 mild dehydration likely from abdominal surgery.  Continue LR 75 cc an hour        Hebert Padilla, DO  General Surgery  [facility] - [address] [place] [address]

## 2023-12-21 NOTE — PLAN OF CARE
Chart reviewed. +flatus; however, pt w/o BM in 2 days. Given milk of magnesia. Adv to regular diet and tolerating. Anticipate dc home on tomorrow with [facility]. Ss following for dc needs and recommendations.

## 2023-12-22 PROCEDURE — 27000173 HC ACAPELLA DEVICE DH OR DM

## 2023-12-22 PROCEDURE — 63600175 PHARM REV CODE 636 W HCPCS: Performed by: SURGERY

## 2023-12-22 PROCEDURE — 99900035 HC TECH TIME PER 15 MIN (STAT)

## 2023-12-22 PROCEDURE — 25000003 PHARM REV CODE 250: Performed by: STUDENT IN AN ORGANIZED HEALTH CARE EDUCATION/TRAINING PROGRAM

## 2023-12-22 PROCEDURE — 87070 CULTURE OTHR SPECIMN AEROBIC: CPT | Performed by: SURGERY

## 2023-12-22 PROCEDURE — 97116 GAIT TRAINING THERAPY: CPT | Mod: CQ

## 2023-12-22 PROCEDURE — 11000001 HC ACUTE MED/SURG PRIVATE ROOM

## 2023-12-22 PROCEDURE — 25000003 PHARM REV CODE 250: Performed by: SURGERY

## 2023-12-22 PROCEDURE — 94664 DEMO&/EVAL PT USE INHALER: CPT

## 2023-12-22 PROCEDURE — 63600175 PHARM REV CODE 636 W HCPCS: Performed by: STUDENT IN AN ORGANIZED HEALTH CARE EDUCATION/TRAINING PROGRAM

## 2023-12-22 PROCEDURE — 97110 THERAPEUTIC EXERCISES: CPT | Mod: CQ

## 2023-12-22 PROCEDURE — 94761 N-INVAS EAR/PLS OXIMETRY MLT: CPT

## 2023-12-22 PROCEDURE — 25000242 PHARM REV CODE 250 ALT 637 W/ HCPCS: Performed by: STUDENT IN AN ORGANIZED HEALTH CARE EDUCATION/TRAINING PROGRAM

## 2023-12-22 PROCEDURE — 94640 AIRWAY INHALATION TREATMENT: CPT

## 2023-12-22 RX ADMIN — IPRATROPIUM BROMIDE AND ALBUTEROL SULFATE 3 ML: 2.5; .5 SOLUTION RESPIRATORY (INHALATION) at 12:12

## 2023-12-22 RX ADMIN — AMPICILLIN AND SULBACTAM 3 G: 2; 1 INJECTION, POWDER, FOR SOLUTION INTRAVENOUS at 11:12

## 2023-12-22 RX ADMIN — SODIUM CHLORIDE, POTASSIUM CHLORIDE, SODIUM LACTATE AND CALCIUM CHLORIDE 1000 ML: 600; 310; 30; 20 INJECTION, SOLUTION INTRAVENOUS at 11:12

## 2023-12-22 RX ADMIN — METHOCARBAMOL 1000 MG: 500 TABLET ORAL at 03:12

## 2023-12-22 RX ADMIN — IPRATROPIUM BROMIDE AND ALBUTEROL SULFATE 3 ML: 2.5; .5 SOLUTION RESPIRATORY (INHALATION) at 07:12

## 2023-12-22 RX ADMIN — AMPICILLIN AND SULBACTAM 3 G: 2; 1 INJECTION, POWDER, FOR SOLUTION INTRAVENOUS at 05:12

## 2023-12-22 RX ADMIN — QUETIAPINE FUMARATE 50 MG: 25 TABLET ORAL at 09:12

## 2023-12-22 RX ADMIN — OXYCODONE HYDROCHLORIDE AND ACETAMINOPHEN 1 TABLET: 5; 325 TABLET ORAL at 05:12

## 2023-12-22 RX ADMIN — OXYCODONE HYDROCHLORIDE AND ACETAMINOPHEN 1 TABLET: 5; 325 TABLET ORAL at 06:12

## 2023-12-22 RX ADMIN — OXYCODONE HYDROCHLORIDE AND ACETAMINOPHEN 1 TABLET: 5; 325 TABLET ORAL at 11:12

## 2023-12-22 RX ADMIN — Medication 6 MG: at 09:12

## 2023-12-22 RX ADMIN — METHOCARBAMOL 1000 MG: 500 TABLET ORAL at 09:12

## 2023-12-22 RX ADMIN — IPRATROPIUM BROMIDE AND ALBUTEROL SULFATE 3 ML: 2.5; .5 SOLUTION RESPIRATORY (INHALATION) at 01:12

## 2023-12-22 RX ADMIN — ENOXAPARIN SODIUM 40 MG: 40 INJECTION SUBCUTANEOUS at 05:12

## 2023-12-22 NOTE — PROGRESS NOTES
[facility] - [facility]  General Surgery  Progress Note    Subjective:     History of Present Illness:  No notes on file    Post-Op Info:  Procedure(s) (LRB):  LAPAROSCOPY  REPAIR, HERNIA, INGUINAL  SMALL BOWEL RESECTION (N/A)  APPENDECTOMY  CHOLANGIOGRAM (N/A)  CHOLECYSTECTOMY  LYSIS, ADHESIONS   9 Days Post-Op     Interval History:  Looks good common blood pressure a little    Medications:  Continuous Infusions:  Scheduled Meds:   albuterol-ipratropium  3 mL Nebulization Q6H    ampicillin-sulbactim (UNASYN) IVPB  3 g Intravenous Q6H    enoxaparin  40 mg Subcutaneous Daily    lactated ringers  1,000 mL Intravenous Once    methocarbamoL  1,000 mg Oral TID    QUEtiapine  50 mg Oral QHS     PRN Meds:acetaminophen, HYDROmorphone, melatonin, ondansetron, ondansetron, oxyCODONE-acetaminophen, prochlorperazine, simethicone, sodium chloride 0.9%     Review of patient's allergies indicates:   Allergen Reactions    Sulfa (sulfonamide antibiotics)      Objective:     Vital Signs (Most Recent):  Temp: 97.8 °F (36.6 °C) (12/22/23 1000)  Pulse: 101 (12/22/23 1000)  Resp: 19 (12/22/23 1000)  BP: (!) 94/51 (12/22/23 1000)  SpO2: 96 % (12/22/23 1000) Vital Signs (24h Range):  Temp:  [97.6 °F (36.4 °C)-98.5 °F (36.9 °C)] 97.8 °F (36.6 °C)  Pulse:  [] 101  Resp:  [16-20] 19  SpO2:  [92 %-99 %] 96 %  BP: ()/(41-66) 94/51     Weight: 42.2 kg (93 lb 0.6 oz)  Body mass index is 14.15 kg/m².    Intake/Output - Last 3 Shifts         12/20 0700  12/21 0659 12/21 0700  12/22 0659 12/22 0700  12/23 0659    P.O. 480 960     Total Intake(mL/kg) 480 (11.4) 960 (22.7)     Urine (mL/kg/hr) 2 (0)      Total Output 2      Net +478 +960            Urine Occurrence 2 x 8 x 1 x    Stool Occurrence  0 x 1 x             Physical Exam  Pulmonary:      Effort: Pulmonary effort is normal.   Abdominal:      Palpations: Abdomen is soft.      Comments: Staples clean intact he has got a little bit of cloudy drainage from the  inferior aspect of the wound of clean this area we have redressed we will culture   Neurological:      Mental Status: She is alert.          Significant Labs:  I have reviewed all pertinent lab results within the past 24 hours.  CBC:   Recent Labs   Lab 12/20/23 0413   WBC 10.95   RBC 4.30   HGB 13.3   HCT 38.6      MCV 89.8   MCH 30.9   MCHC 34.5     BMP:   Recent Labs   Lab 12/20/23 0413 12/20/23  0523   GLU  --  62*   NA  --  136   K  --  3.7   CL  --  103   CO2  --  24   BUN  --  9   CREATININE  --  0.28*   CALCIUM  --  7.9*   MG 1.5*  --        Significant Diagnostics:  I have reviewed all pertinent imaging results/findings within the past 24 hours.  Assessment/Plan:     * S/P small bowel resection  12/14:  Status post lysis of adhesions with small bowel resection.  White count 5, afebrile.  Nanci dressing to abdomen dry and intact    12/15: Decrease ice; ok with hard candy/gum. D/C mckinnon. Replace electrolytes; awaiting more bowel function    12/16:  Clamp NG tube and we will we check at 2:00 p.m.; if less than 250 cc, DC NG tube and start clear liquid diet    12/17:  NG tube out, start clear liquid diet    12/18: advance to full liquid diet    12/19: Advance to soft diet. +flatus/stool. Go slow with diet. CHEVY drains and penrose drain out    12/20: Tolerating diet. Awaiting more bowel function. Anticipate D/C home soon    12/21: Will give Milk of mag to help with BM; anticipate D/C home tomorrow    2022:  Looks good but blood pressure was low she would 78 systolic we will bolus of fluids all hold onto another 24 hours all culture of the drainage from the inferior aspect of the wound we will put her on some antibiotics      Hypophosphatemia  Replaced with oral medication    Hypokalemia  Replace with oral medication    Cholelithiasis with acute cholecystitis  12/14:  Status post cholecystectomy, femoral hernia repair small-bowel resection, lysis of adhesions appendectomy.  Abdominal incision dry and  intact.  No flatus reported NPO except for ice chips.  Abdomen appropriately tender.  Afebrile, white count 5.  Creatinine 0.64 BUN 25.  BUN/creatinine ratio of 39 mild dehydration likely from abdominal surgery.  Continue LR 75 cc an hour        Gudelia Guthrie MD  General Surgery  [facility] - [address] [place] [address]

## 2023-12-22 NOTE — ASSESSMENT & PLAN NOTE
12/14:  Status post lysis of adhesions with small bowel resection.  White count 5, afebrile.  Nanci dressing to abdomen dry and intact    12/15: Decrease ice; ok with hard candy/gum. D/C mckinnon. Replace electrolytes; awaiting more bowel function    12/16:  Clamp NG tube and we will we check at 2:00 p.m.; if less than 250 cc, DC NG tube and start clear liquid diet    12/17:  NG tube out, start clear liquid diet    12/18: advance to full liquid diet    12/19: Advance to soft diet. +flatus/stool. Go slow with diet. CHEVY drains and penrose drain out    12/20: Tolerating diet. Awaiting more bowel function. Anticipate D/C home soon    12/21: Will give Milk of mag to help with BM; anticipate D/C home tomorrow    2022:  Looks good but blood pressure was low she would 78 systolic we will bolus of fluids all hold onto another 24 hours all culture of the drainage from the inferior aspect of the wound we will put her on some antibiotics

## 2023-12-22 NOTE — PLAN OF CARE
Problem: Airway Clearance Ineffective  Goal: Effective Airway Clearance  Outcome: Ongoing, Progressing     Problem: Gas Exchange Impaired  Goal: Optimal Gas Exchange  Outcome: Ongoing, Progressing

## 2023-12-22 NOTE — SUBJECTIVE & OBJECTIVE
Interval History:  Looks good common blood pressure a little    Medications:  Continuous Infusions:  Scheduled Meds:   albuterol-ipratropium  3 mL Nebulization Q6H    ampicillin-sulbactim (UNASYN) IVPB  3 g Intravenous Q6H    enoxaparin  40 mg Subcutaneous Daily    lactated ringers  1,000 mL Intravenous Once    methocarbamoL  1,000 mg Oral TID    QUEtiapine  50 mg Oral QHS     PRN Meds:acetaminophen, HYDROmorphone, melatonin, ondansetron, ondansetron, oxyCODONE-acetaminophen, prochlorperazine, simethicone, sodium chloride 0.9%     Review of patient's allergies indicates:   Allergen Reactions    Sulfa (sulfonamide antibiotics)      Objective:     Vital Signs (Most Recent):  Temp: 97.8 °F (36.6 °C) (12/22/23 1000)  Pulse: 101 (12/22/23 1000)  Resp: 19 (12/22/23 1000)  BP: (!) 94/51 (12/22/23 1000)  SpO2: 96 % (12/22/23 1000) Vital Signs (24h Range):  Temp:  [97.6 °F (36.4 °C)-98.5 °F (36.9 °C)] 97.8 °F (36.6 °C)  Pulse:  [] 101  Resp:  [16-20] 19  SpO2:  [92 %-99 %] 96 %  BP: ()/(41-66) 94/51     Weight: 42.2 kg (93 lb 0.6 oz)  Body mass index is 14.15 kg/m².    Intake/Output - Last 3 Shifts         12/20 0700  12/21 0659 12/21 0700  12/22 0659 12/22 0700  12/23 0659    P.O. 480 960     Total Intake(mL/kg) 480 (11.4) 960 (22.7)     Urine (mL/kg/hr) 2 (0)      Total Output 2      Net +478 +960            Urine Occurrence 2 x 8 x 1 x    Stool Occurrence  0 x 1 x             Physical Exam  Pulmonary:      Effort: Pulmonary effort is normal.   Abdominal:      Palpations: Abdomen is soft.      Comments: Staples clean intact he has got a little bit of cloudy drainage from the inferior aspect of the wound of clean this area we have redressed we will culture   Neurological:      Mental Status: She is alert.          Significant Labs:  I have reviewed all pertinent lab results within the past 24 hours.  CBC:   Recent Labs   Lab 12/20/23  0413   WBC 10.95   RBC 4.30   HGB 13.3   HCT 38.6      MCV 89.8   MCH  30.9   MCHC 34.5     BMP:   Recent Labs   Lab 12/20/23  0413 12/20/23  0523   GLU  --  62*   NA  --  136   K  --  3.7   CL  --  103   CO2  --  24   BUN  --  9   CREATININE  --  0.28*   CALCIUM  --  7.9*   MG 1.5*  --        Significant Diagnostics:  I have reviewed all pertinent imaging results/findings within the past 24 hours.

## 2023-12-22 NOTE — PT/OT/SLP PROGRESS
"Physical Therapy Treatment    Patient Name:  Neyda Claire   MRN:  [id number]    Recommendations:     Discharge Recommendations: Low Intensity Therapy  Discharge Equipment Recommendations: walker, rolling  Barriers to discharge: None    Assessment:     Neyda Claire is a 63 y.o. female admitted with a medical diagnosis of S/P small bowel resection.  She presents with the following impairments/functional limitations: impaired skin.    Pt with increased gait distance and overall activity tolerance.  Pt with possible discharge this date and is excited about same    Rehab Prognosis: Good; patient would benefit from acute skilled PT services to address these deficits and reach maximum level of function.    Recent Surgery: Procedure(s) (LRB):  LAPAROSCOPY  REPAIR, HERNIA, INGUINAL  SMALL BOWEL RESECTION (N/A)  APPENDECTOMY  CHOLANGIOGRAM (N/A)  CHOLECYSTECTOMY  LYSIS, ADHESIONS 9 Days Post-Op    Plan:     During this hospitalization, patient to be seen 5 x/week to address the identified rehab impairments via therapeutic activities, therapeutic exercises, gait training and progress toward the following goals:    Plan of Care Expires:  01/15/24    Subjective     Chief Complaint: s/p small bowel resection  Patient/Family Comments/goals: "I'm waiting on the doctor to let me go home"  Pain/Comfort:         Objective:     Communicated with Linnea Lyles RN prior to session.  Patient found HOB elevated with peripheral IV, telemetry upon PT entry to room.     General Precautions: Standard, fall  Orthopedic Precautions: N/A  Braces: N/A  Respiratory Status: Room air     Functional Mobility:  Bed Mobility:     Supine to Sit: independence  Sit to Supine: independence  Transfers:     Sit to Stand:  independence with no AD  Gait: 384' with standby assist and no AD; slow jovon, mild path deviation but no LOB      AM-PAC 6 CLICK MOBILITY  Turning over in bed (including adjusting bedclothes, sheets and blankets)?: 4  Sitting down on and " standing up from a chair with arms (e.g., wheelchair, bedside commode, etc.): 4  Moving from lying on back to sitting on the side of the bed?: 4  Moving to and from a bed to a chair (including a wheelchair)?: 4  Need to walk in hospital room?: 4  Climbing 3-5 steps with a railing?: 3  Basic Mobility Total Score: 23       Treatment & Education:  Pt performed 30 reps (B) LE exercises: ap, quad set, glut set, straight leg raise, hip ab/adduction, long arc quad, heel slide with assist and rest as needed      Patient left HOB elevated with all lines intact, call button in reach, and CNAs present..    GOALS:   Multidisciplinary Problems       Physical Therapy Goals          Problem: Physical Therapy    Goal Priority Disciplines Outcome Goal Variances Interventions   Physical Therapy Goal     PT, PT/OT Ongoing, Progressing     Description: Short Term Goals to be met by: 23    Patient will increase functional independence with mobility by performin. Supine to sit with Modified [place]  2. Sit to stand transfer with Modified [place]  3. Bed to chair transfer with Modified [place] using Rolling Walker  4. Gait  x 100 feet with Modified [place] using Rolling Walker  5. Lower extremity exercise program x30 reps per handout, with assistance as needed  6. Negotiate 4 steps with bilat rails with SBA    Long Term Goals to be met by: 1/15/24    Pt will regain full independent functional mobility to return to prior activities of daily living.                        Time Tracking:     PT Received On: 23  PT Start Time: 09     PT Stop Time: 1000  PT Total Time (min): 29 min     Billable Minutes: Gait Training 10 and Therapeutic Activity 15    Treatment Type: Treatment  PT/PTA: PTA     Number of PTA visits since last PT visit: 4     2023

## 2023-12-23 VITALS
HEART RATE: 94 BPM | OXYGEN SATURATION: 96 % | SYSTOLIC BLOOD PRESSURE: 94 MMHG | WEIGHT: 93.06 LBS | TEMPERATURE: 98 F | HEIGHT: 68 IN | RESPIRATION RATE: 18 BRPM | DIASTOLIC BLOOD PRESSURE: 57 MMHG | BODY MASS INDEX: 14.1 KG/M2

## 2023-12-23 PROCEDURE — 94761 N-INVAS EAR/PLS OXIMETRY MLT: CPT

## 2023-12-23 PROCEDURE — 63600175 PHARM REV CODE 636 W HCPCS: Performed by: SURGERY

## 2023-12-23 PROCEDURE — 99900035 HC TECH TIME PER 15 MIN (STAT)

## 2023-12-23 PROCEDURE — 25000003 PHARM REV CODE 250: Performed by: SURGERY

## 2023-12-23 PROCEDURE — 27000173 HC ACAPELLA DEVICE DH OR DM

## 2023-12-23 PROCEDURE — 25000003 PHARM REV CODE 250: Performed by: STUDENT IN AN ORGANIZED HEALTH CARE EDUCATION/TRAINING PROGRAM

## 2023-12-23 PROCEDURE — 94664 DEMO&/EVAL PT USE INHALER: CPT

## 2023-12-23 PROCEDURE — 25000242 PHARM REV CODE 250 ALT 637 W/ HCPCS: Performed by: STUDENT IN AN ORGANIZED HEALTH CARE EDUCATION/TRAINING PROGRAM

## 2023-12-23 PROCEDURE — 94640 AIRWAY INHALATION TREATMENT: CPT

## 2023-12-23 RX ORDER — CIPROFLOXACIN 500 MG/1
500 TABLET ORAL EVERY 12 HOURS
Qty: 14 TABLET | Refills: 0 | Status: SHIPPED | OUTPATIENT
Start: 2023-12-23

## 2023-12-23 RX ADMIN — IPRATROPIUM BROMIDE AND ALBUTEROL SULFATE 3 ML: 2.5; .5 SOLUTION RESPIRATORY (INHALATION) at 01:12

## 2023-12-23 RX ADMIN — IPRATROPIUM BROMIDE AND ALBUTEROL SULFATE 3 ML: 2.5; .5 SOLUTION RESPIRATORY (INHALATION) at 07:12

## 2023-12-23 RX ADMIN — OXYCODONE HYDROCHLORIDE AND ACETAMINOPHEN 1 TABLET: 5; 325 TABLET ORAL at 09:12

## 2023-12-23 RX ADMIN — METHOCARBAMOL 1000 MG: 500 TABLET ORAL at 03:12

## 2023-12-23 RX ADMIN — AMPICILLIN AND SULBACTAM 3 G: 2; 1 INJECTION, POWDER, FOR SOLUTION INTRAVENOUS at 12:12

## 2023-12-23 RX ADMIN — AMPICILLIN AND SULBACTAM 3 G: 2; 1 INJECTION, POWDER, FOR SOLUTION INTRAVENOUS at 06:12

## 2023-12-23 RX ADMIN — OXYCODONE HYDROCHLORIDE AND ACETAMINOPHEN 1 TABLET: 5; 325 TABLET ORAL at 01:12

## 2023-12-23 RX ADMIN — AMPICILLIN AND SULBACTAM 3 G: 2; 1 INJECTION, POWDER, FOR SOLUTION INTRAVENOUS at 01:12

## 2023-12-23 RX ADMIN — METHOCARBAMOL 1000 MG: 500 TABLET ORAL at 09:12

## 2023-12-23 NOTE — DISCHARGE SUMMARY
[facility] - [facility] Medical Telemetry  General Surgery  Discharge Summary      Patient Name: Neyda Claire  MRN: [id number]  Admission Date: 12/13/2023  Hospital Length of Stay: 10 days  Discharge Date and Time:  12/23/2023 9:35 AM  Attending Physician: Hebert Busby DO   Discharging Provider: Gudelia Guthrie MD  Primary Care Provider: Ashleigh Figueroa MD    HPI:   No notes on file    Procedure(s) (LRB):  LAPAROSCOPY  REPAIR, HERNIA, INGUINAL  SMALL BOWEL RESECTION (N/A)  APPENDECTOMY  CHOLANGIOGRAM (N/A)  CHOLECYSTECTOMY  LYSIS, ADHESIONS      Indwelling Lines/Drains at time of discharge:   Lines/Drains/Airways       None                 Hospital Course: 63-year-old  female presented initially to the clinic for complaints of abdominal pain.  Found to have an incarcerated right inguinal hernia causing obstruction and also having acute calculous cholecystitis.  Patient was taken the operating room the underwent an open repair of the hernia and small bowel resection and open cholecystectomy.  Patient had a prolonged course with ileus which is resolving slowly.  Patient is tolerating diet and passing gas.  Awaiting for increased bowel movements.  Patient looks well    12/22/2023     Patient looks well but blood pressure was running low at 78 systolic we will give her a bolus of LR will started on some antibiotics she is got some mild drainage the most inferior aspect of her wound and all hold onto her another 24 hours    12-23  bp is better no diziness, home with f/u     Goals of Care Treatment Preferences:  Code Status: Full Code      Consults:     Significant Diagnostic Studies: N/A    Pending Diagnostic Studies:       None          Final Active Diagnoses:    Diagnosis Date Noted POA    PRINCIPAL PROBLEM:  S/P small bowel resection [Z90.49] 12/14/2023 Not Applicable    Hypokalemia [E87.6] 12/16/2023 Clinically Undetermined    Hypophosphatemia [E83.39] 12/16/2023 Clinically Undetermined     Cholelithiasis with acute cholecystitis [K80.00] 12/14/2023 Yes      Problems Resolved During this Admission:      Discharged Condition: good    Disposition: Home or Self Care    Follow Up:   Contact information for follow-up providers       Hebert Busby DO. Schedule an appointment as soon as possible for a visit on 1/8/2024.    Specialties: General Surgery, Surgery  Why: 9:30 am  Contact information:  [address] [facility]  [place] [address]  [phone]                       Contact information for after-discharge care       Home Medical Care       [facility] [place] .    Service: Home Health Services  Contact information:  [address]  [place] [address]  [phone]                                   Patient Instructions:      Diet Adult Regular     Lifting restrictions   Order Comments: No lifting over 15 lb for the next 2 weeks, followed by nothing over 25 lb for the following 4 weeks     Remove dressing in 24 hours   Order Comments: Okay to change dressings daily.  Okay to shower with soap and water rinse over skin and pat dry.  Placed dressings if needed.  Can apply mupirocin ointment over staple line daily and keep covered     Activity as tolerated     Medications:  Reconciled Home Medications: cipro po     Medication List        START taking these medications      docusate sodium 100 MG capsule  Commonly known as: COLACE  Take 1 capsule (100 mg total) by mouth 2 (two) times daily.     mupirocin 2 % ointment  Commonly known as: BACTROBAN  Apply topically once daily.     oxyCODONE-acetaminophen 5-325 mg per tablet  Commonly known as: PERCOCET  Take 1 tablet by mouth every 6 (six) hours as needed for Pain.            CONTINUE taking these medications      albuterol 90 mcg/actuation inhaler  Commonly known as: VENTOLIN HFA  Inhale 2 puffs into the lungs every 4 (four) hours as needed for Wheezing or Shortness of Breath. Rescue     albuterol-ipratropium 2.5  mg-0.5 mg/3 mL nebulizer solution  Commonly known as: DUO-NEB  Take 3 mLs by nebulization every 4 (four) hours as needed for Wheezing. Rescue     estradioL 1 MG tablet  Commonly known as: ESTRACE  Take 1 tablet (1 mg total) by mouth once daily.     HYDROcodone-acetaminophen 7.5-325 mg per tablet  Commonly known as: NORCO  Take 1 tablet by mouth every 6 (six) hours as needed for Pain.     meloxicam 7.5 MG tablet  Commonly known as: MOBIC  Take 1 tablet (7.5 mg total) by mouth once daily.     promethazine 25 MG tablet  Commonly known as: PHENERGAN  Take 1 tablet (25 mg total) by mouth every 6 (six) hours as needed for Nausea.     QUEtiapine 50 MG tablet  Commonly known as: SEROQUEL  Take 1 tablet (50 mg total) by mouth every evening. And may increase to 2 tablets in the evening.            Time spent on the discharge of patient: 15 minutes    Gudelia Guthrie MD  General Surgery  [facility] - [address] [place] [address]

## 2023-12-25 LAB — MICROORGANISM SPEC CULT: NORMAL

## 2023-12-26 PROCEDURE — G0180 MD CERTIFICATION HHA PATIENT: HCPCS | Mod: ,,, | Performed by: STUDENT IN AN ORGANIZED HEALTH CARE EDUCATION/TRAINING PROGRAM

## 2023-12-27 NOTE — PHYSICIAN QUERY
PT Name: Neyda Claire  MR #: [id number]    DOCUMENTATION CLARIFICATION     CDS:  Anne LINDER, RN   Contact information:  [email or web address]  This form is a permanent document in the medical record.     Query Date: December 27, 2023    By submitting this query, we are merely seeking further clarification of documentation.  Please utilize your independent clinical judgment when addressing the question(s) below.    The medical record contains the following:  Pathology Findings Location in Medical Record   Appendix, appendectomy:  - Benign appendix with a dilated lumen     Small bowel stricture, excision:  - Segment of small bowel with ischemic-type changes and focal mucosal necrosis     Gallbladder, cholecystectomy:  - Chronic cholecystitis     Path report  12/13/23       Please clarify the pathology findings.  [   x  ] Pathology findings noted above are ruled in/confirmed as diagnoses   [     ] Pathology findings noted above are not confirmed as diagnoses   [    ] Pathology findings noted above are incidental   [    ] Other diagnosis (please specify): ___________   [  ] Clinically Undetermined     Please document in your progress notes daily for the duration of treatment until resolved and include in your discharge summary.    Form No. [id number]

## 2023-12-27 NOTE — PLAN OF CARE
[facility] - [facility]  Discharge Final Note    Primary Care Provider: Ashleigh Figueroa MD    Expected Discharge Date:     Final Discharge Note (most recent)       Final Note - 12/27/23 0900          Final Note    Assessment Type Final Discharge Note     Anticipated Discharge Disposition Home-Health Care Svc     What phone number can be called within the next 1-3 days to see how you are doing after discharge? [phone]        Post-Acute Status    Post-Acute Authorization Home Health     Home Health Status Set-up Complete/Auth obtained     Patient choice form signed by patient/caregiver List with quality metrics by geographic area provided;List from CMS Compare;List from System Post-Acute Care     Discharge Delays None known at this time                      Follow-up providers       Hebert Busby DO   Specialty: General Surgery, Surgery    [address] [facility]  [facility] [address]   Phone: [phone]       Next Steps: Schedule an appointment as soon as possible for a visit on 1/8/2024    Instructions: 9:30 am              After-discharge care                Home Medical Care       [facility]   Service: Home Health Services    [address] [facility]  [place] [address]   Phone: [phone]                             Pt dc [facility] with [facility]. No further dc needs noted.

## 2023-12-28 ENCOUNTER — PATIENT OUTREACH (OUTPATIENT)
Dept: ADMINISTRATIVE | Facility: CLINIC | Age: 63
End: 2023-12-28

## 2023-12-28 NOTE — PROGRESS NOTES
C3 nurse attempted to contact Neyda Claire  for a TCC post hospital discharge follow up call. No answer. Left voicemail with callback information. The patient does not have a scheduled HOSFU appointment.

## 2023-12-29 NOTE — PROGRESS NOTES
Received voicemail from patient.    Returned call. C3 nurse spoke with Neyda Claire  for a TCC post hospital discharge follow up call. Nurse offered to scheduled TCC hospital follow-up appointment. The patient declined appointment at this time.  Patient states she is not using Dr. Figueroa as her pcp and does not know who she is going to use at this time.

## 2024-01-03 ENCOUNTER — OFFICE VISIT (OUTPATIENT)
Dept: SURGERY | Facility: CLINIC | Age: 64
End: 2024-01-03
Payer: COMMERCIAL

## 2024-01-03 DIAGNOSIS — K40.30 INCARCERATED INGUINAL HERNIA: Primary | ICD-10-CM

## 2024-01-03 PROCEDURE — 1159F MED LIST DOCD IN RCRD: CPT | Mod: ,,, | Performed by: STUDENT IN AN ORGANIZED HEALTH CARE EDUCATION/TRAINING PROGRAM

## 2024-01-03 PROCEDURE — 99024 POSTOP FOLLOW-UP VISIT: CPT | Mod: ,,, | Performed by: STUDENT IN AN ORGANIZED HEALTH CARE EDUCATION/TRAINING PROGRAM

## 2024-01-03 PROCEDURE — 99213 OFFICE O/P EST LOW 20 MIN: CPT | Mod: PBBFAC | Performed by: STUDENT IN AN ORGANIZED HEALTH CARE EDUCATION/TRAINING PROGRAM

## 2024-01-03 NOTE — PROGRESS NOTES
Subjective     Patient ID: Neyda Claire is a 63 y.o. female.    Chief Complaint: Post-op Evaluation    63-year-old  female presents to the clinic for postop evaluation status post open exploratory laparotomy with strangulated small bowel in hernia requiring small-bowel resection, primary closure of femoral hernia defect, incidental appendectomy, open cholecystectomy with cholangiogram.  Patient doing well.  Patient is tolerating diet and having loose stools, but patient has gained 4 lb and states her stools are becoming more formed.  Denies any chest pain/shortness of breath, nausea/vomiting/diarrhea, fever/chills.      Review of Systems   Constitutional: Negative.    HENT: Negative.     Eyes: Negative.    Respiratory:  Negative for shortness of breath.    Cardiovascular:  Negative for chest pain.   Gastrointestinal:  Negative for abdominal distention, abdominal pain, blood in stool and change in bowel habit.   Genitourinary: Negative.  Negative for hematuria.   Musculoskeletal:  Negative for myalgias.   Neurological:  Negative for dizziness and weakness.   Psychiatric/Behavioral: Negative.            Objective     Physical Exam  Constitutional:       General: She is not in acute distress.     Appearance: Normal appearance.   HENT:      Head: Normocephalic.   Cardiovascular:      Rate and Rhythm: Normal rate.   Pulmonary:      Effort: Pulmonary effort is normal. No respiratory distress.   Abdominal:      General: There is no distension.      Tenderness: There is no abdominal tenderness.          Comments: Area of eschar around umbilicus; some eschar at the inferior portion of the incision from previous Penrose drain.  Skin staples in place; no signs of infection or drainage   Musculoskeletal:         General: Normal range of motion.   Skin:     General: Skin is warm.      Coloration: Skin is not jaundiced.   Neurological:      General: No focal deficit present.      Mental Status: She is alert and oriented  to person, place, and time.      Cranial Nerves: No cranial nerve deficit.            Assessment and Plan     1. Incarcerated inguinal hernia        Patient doing well.  Staples removed and Steri-Strips placed.  Eschar removed with curette and sterile dressing placed at both the umbilical site in the inferior portion of the incision.  Patient is doing well.  Patient to follow back up in 4 weeks for monitoring.  Pathology showed benign appendix, the ischemic segment of resected bowel, chronic cholecystitis         No follow-ups on file.

## 2024-01-12 ENCOUNTER — EXTERNAL HOME HEALTH (OUTPATIENT)
Dept: HOME HEALTH SERVICES | Facility: HOSPITAL | Age: 64
End: 2024-01-12
Payer: COMMERCIAL

## 2024-01-15 RX ORDER — PROMETHAZINE HYDROCHLORIDE 25 MG/1
25 TABLET ORAL EVERY 6 HOURS PRN
Qty: 15 TABLET | Refills: 0 | Status: SHIPPED | OUTPATIENT
Start: 2024-01-15

## 2024-01-16 ENCOUNTER — OFFICE VISIT (OUTPATIENT)
Dept: SURGERY | Facility: CLINIC | Age: 64
End: 2024-01-16
Payer: COMMERCIAL

## 2024-01-16 DIAGNOSIS — R10.9 ABDOMINAL PAIN, UNSPECIFIED ABDOMINAL LOCATION: Primary | ICD-10-CM

## 2024-01-16 PROCEDURE — 99213 OFFICE O/P EST LOW 20 MIN: CPT | Mod: PBBFAC | Performed by: STUDENT IN AN ORGANIZED HEALTH CARE EDUCATION/TRAINING PROGRAM

## 2024-01-16 PROCEDURE — 99024 POSTOP FOLLOW-UP VISIT: CPT | Mod: S$PBB,,, | Performed by: STUDENT IN AN ORGANIZED HEALTH CARE EDUCATION/TRAINING PROGRAM

## 2024-01-16 PROCEDURE — 1159F MED LIST DOCD IN RCRD: CPT | Mod: ,,, | Performed by: STUDENT IN AN ORGANIZED HEALTH CARE EDUCATION/TRAINING PROGRAM

## 2024-01-16 PROCEDURE — 1111F DSCHRG MED/CURRENT MED MERGE: CPT | Mod: ,,, | Performed by: STUDENT IN AN ORGANIZED HEALTH CARE EDUCATION/TRAINING PROGRAM

## 2024-01-16 NOTE — PROGRESS NOTES
Subjective     Patient ID: Neyda Claire is a 63 y.o. female.    Chief Complaint: Follow-up (F/u to ck incision )    63-year-old  female presents to the clinic for postop evaluation status post open exploratory laparotomy with strangulated small bowel in hernia requiring small-bowel resection, primary closure of femoral hernia defect, incidental appendectomy, open cholecystectomy with cholangiogram.  Patient doing well.  Patient is tolerating diet and having loose stools, but patient has gained 4 lb and states her stools are becoming more formed.  Denies any chest pain/shortness of breath, nausea/vomiting/diarrhea, fever/chills.    1/16:   Patient presents to clinic for evaluation for abdominal wound.  Thinks she still has a staple in place under the skin.  Denies any chest pain/shortness of breath, nausea/vomiting/diarrhea, fever/chills.      Review of Systems   Constitutional: Negative.    HENT: Negative.     Eyes: Negative.    Respiratory:  Negative for shortness of breath.    Cardiovascular:  Negative for chest pain.   Gastrointestinal:  Negative for abdominal distention, abdominal pain, blood in stool and change in bowel habit.   Genitourinary: Negative.  Negative for hematuria.   Musculoskeletal:  Negative for myalgias.   Neurological:  Negative for dizziness and weakness.   Psychiatric/Behavioral: Negative.            Objective     Physical Exam  Constitutional:       General: She is not in acute distress.     Appearance: Normal appearance.   HENT:      Head: Normocephalic.   Cardiovascular:      Rate and Rhythm: Normal rate.   Pulmonary:      Effort: Pulmonary effort is normal. No respiratory distress.   Abdominal:      General: There is no distension.      Tenderness: There is no abdominal tenderness.          Comments:   PDS suture present at the superior and inferior portions of the skin slightly externalize; scab covering umbilicus site.  No signs of infection or drainage   Musculoskeletal:          General: Normal range of motion.   Skin:     General: Skin is warm.      Coloration: Skin is not jaundiced.   Neurological:      General: No focal deficit present.      Mental Status: She is alert and oriented to person, place, and time.      Cranial Nerves: No cranial nerve deficit.            Assessment and Plan     1. Abdominal pain, unspecified abdominal location         No staples present.  PDS sutures present at the superior and inferior edges of the incision; incision well healed.  Midportion of the incision with scab coverage.  Continue to place mupirocin ointment around the sites; follow back up in 4 weeks.  After a proximally 8-12 weeks, if suture material still present, we will excise the stitch at that time.       No follow-ups on file.

## 2024-02-07 ENCOUNTER — DOCUMENT SCAN (OUTPATIENT)
Dept: HOME HEALTH SERVICES | Facility: HOSPITAL | Age: 64
End: 2024-02-07
Payer: COMMERCIAL

## 2024-02-19 ENCOUNTER — OFFICE VISIT (OUTPATIENT)
Dept: SURGERY | Facility: CLINIC | Age: 64
End: 2024-02-19
Payer: COMMERCIAL

## 2024-02-19 DIAGNOSIS — K40.30 INCARCERATED INGUINAL HERNIA: Primary | ICD-10-CM

## 2024-02-19 PROCEDURE — 99024 POSTOP FOLLOW-UP VISIT: CPT | Mod: S$PBB,,, | Performed by: STUDENT IN AN ORGANIZED HEALTH CARE EDUCATION/TRAINING PROGRAM

## 2024-02-19 PROCEDURE — 99213 OFFICE O/P EST LOW 20 MIN: CPT | Mod: PBBFAC | Performed by: STUDENT IN AN ORGANIZED HEALTH CARE EDUCATION/TRAINING PROGRAM

## 2024-02-19 PROCEDURE — 1159F MED LIST DOCD IN RCRD: CPT | Mod: ,,, | Performed by: STUDENT IN AN ORGANIZED HEALTH CARE EDUCATION/TRAINING PROGRAM

## 2024-02-19 NOTE — PROGRESS NOTES
Subjective     Patient ID: Neyda Claire is a 63 y.o. female.    Chief Complaint: Follow-up (F/u exploratory laparotomy )    63-year-old  female presents to the clinic for postop evaluation status post open exploratory laparotomy with strangulated small bowel in hernia requiring small-bowel resection, primary closure of femoral hernia defect, incidental appendectomy, open cholecystectomy with cholangiogram.  Patient doing well.  Patient is tolerating diet and having loose stools, but patient has gained 4 lb and states her stools are becoming more formed.  Denies any chest pain/shortness of breath, nausea/vomiting/diarrhea, fever/chills.    1/16:   Patient presents to clinic for evaluation for abdominal wound.  Thinks she still has a staple in place under the skin.  Denies any chest pain/shortness of breath, nausea/vomiting/diarrhea, fever/chills.    2/19:  Patient presents for re-evaluation.  Patient doing well, tolerating diet and having normal bowel function.  Still sees a few sutures present through the skin, which has not changed.  Denies any chest pain/shortness of breath, nausea/vomiting/diarrhea, fever/chills.      Review of Systems   Constitutional: Negative.    HENT: Negative.     Eyes: Negative.    Respiratory:  Negative for shortness of breath.    Cardiovascular:  Negative for chest pain.   Gastrointestinal:  Negative for abdominal distention, abdominal pain, blood in stool and change in bowel habit.   Genitourinary: Negative.  Negative for hematuria.   Musculoskeletal:  Negative for myalgias.   Neurological:  Negative for dizziness and weakness.   Psychiatric/Behavioral: Negative.            Objective     Physical Exam  Constitutional:       General: She is not in acute distress.     Appearance: Normal appearance.   HENT:      Head: Normocephalic.   Cardiovascular:      Rate and Rhythm: Normal rate.   Pulmonary:      Effort: Pulmonary effort is normal. No respiratory distress.   Abdominal:       General: There is no distension.      Tenderness: There is no abdominal tenderness.          Comments:   PDS suture present at the superior and inferior portions of the skin slightly externalize; umbilicus site healed.  No signs of infection or drainage   Musculoskeletal:         General: Normal range of motion.   Skin:     General: Skin is warm.      Coloration: Skin is not jaundiced.   Neurological:      General: No focal deficit present.      Mental Status: She is alert and oriented to person, place, and time.      Cranial Nerves: No cranial nerve deficit.            Assessment and Plan     1. Incarcerated inguinal hernia         Continue ointment and dressing changes to abdomen daily.  Patient to follow back up in 6 weeks for possible suture removal if needed.  Return sooner if having any problems       No follow-ups on file.

## 2024-02-26 RX ORDER — MELOXICAM 7.5 MG/1
7.5 TABLET ORAL
Qty: 30 TABLET | Refills: 0 | OUTPATIENT
Start: 2024-02-26

## 2024-03-21 DIAGNOSIS — R23.2 HOT FLASHES: Primary | ICD-10-CM

## 2024-03-21 RX ORDER — ESTRADIOL 1 MG/1
1 TABLET ORAL
Qty: 30 TABLET | Refills: 0 | Status: SHIPPED | OUTPATIENT
Start: 2024-03-21 | End: 2024-04-25

## 2024-03-26 RX ORDER — PROMETHAZINE HYDROCHLORIDE 25 MG/1
25 TABLET ORAL EVERY 6 HOURS PRN
Qty: 15 TABLET | Refills: 0 | Status: SHIPPED | OUTPATIENT
Start: 2024-03-26

## 2024-04-01 ENCOUNTER — OFFICE VISIT (OUTPATIENT)
Dept: SURGERY | Facility: CLINIC | Age: 64
End: 2024-04-01
Payer: COMMERCIAL

## 2024-04-01 DIAGNOSIS — K46.9 ABDOMINAL HERNIA WITHOUT OBSTRUCTION AND WITHOUT GANGRENE, RECURRENCE NOT SPECIFIED, UNSPECIFIED HERNIA TYPE: Primary | ICD-10-CM

## 2024-04-01 PROCEDURE — 1159F MED LIST DOCD IN RCRD: CPT | Mod: ,,, | Performed by: STUDENT IN AN ORGANIZED HEALTH CARE EDUCATION/TRAINING PROGRAM

## 2024-04-01 PROCEDURE — 99213 OFFICE O/P EST LOW 20 MIN: CPT | Mod: PBBFAC | Performed by: STUDENT IN AN ORGANIZED HEALTH CARE EDUCATION/TRAINING PROGRAM

## 2024-04-01 PROCEDURE — 99213 OFFICE O/P EST LOW 20 MIN: CPT | Mod: S$PBB,,, | Performed by: STUDENT IN AN ORGANIZED HEALTH CARE EDUCATION/TRAINING PROGRAM

## 2024-04-01 NOTE — LETTER
April 1, 2024      [facility] - General Surgery  [address] [place] [id number]  Phone: [phone]  Fax: [phone]       Patient: Neyda Claire   YOB: 1960  Date of Visit: 04/01/2024    To Whom It May Concern:    Lemuel Claire  was at [facility] on 04/01/2024. The patient may return to work on 4/6/24 with restrictions of no heavy lifting over 20lbs until further notice from Dr. Kehinde DO. If you have any questions or concerns, or if I can be of further assistance, please do not hesitate to contact me.    Sincerely,    DO Sita Austin LPN

## 2024-04-01 NOTE — PROGRESS NOTES
Subjective     Patient ID: Neyda Claire is a 63 y.o. female.    Chief Complaint: Follow-up    63-year-old  female presents to the clinic for postop evaluation status post open exploratory laparotomy with strangulated small bowel in hernia requiring small-bowel resection, primary closure of femoral hernia defect, incidental appendectomy, open cholecystectomy with cholangiogram.  Patient doing well.  Patient is tolerating diet and having loose stools, but patient has gained 4 lb and states her stools are becoming more formed.  Denies any chest pain/shortness of breath, nausea/vomiting/diarrhea, fever/chills.    1/16:   Patient presents to clinic for evaluation for abdominal wound.  Thinks she still has a staple in place under the skin.  Denies any chest pain/shortness of breath, nausea/vomiting/diarrhea, fever/chills.    2/19:  Patient presents for re-evaluation.  Patient doing well, tolerating diet and having normal bowel function.  Still sees a few sutures present through the skin, which has not changed.  Denies any chest pain/shortness of breath, nausea/vomiting/diarrhea, fever/chills.    4/1:  Patient doing well.  States of the stitches have fallen out.  She does complain of a puffiness to the abdomen, but denies any pain.  Denies any chest pain/shortness of breath, nausea/vomiting/diarrhea, fever/chills.      Review of Systems   Constitutional: Negative.    HENT: Negative.     Eyes: Negative.    Respiratory:  Negative for shortness of breath.    Cardiovascular:  Negative for chest pain.   Gastrointestinal:  Negative for abdominal distention, abdominal pain, blood in stool and change in bowel habit.   Genitourinary: Negative.  Negative for hematuria.   Musculoskeletal:  Negative for myalgias.   Neurological:  Negative for dizziness and weakness.   Psychiatric/Behavioral: Negative.            Objective     Physical Exam  Constitutional:       General: She is not in acute distress.     Appearance: Normal  appearance.   HENT:      Head: Normocephalic.   Cardiovascular:      Rate and Rhythm: Normal rate.   Pulmonary:      Effort: Pulmonary effort is normal. No respiratory distress.   Abdominal:      General: There is no distension.      Tenderness: There is no abdominal tenderness.          Comments: Loss of domain incisional hernia; skin incisions clean dry and intact and well healed   Musculoskeletal:         General: Normal range of motion.   Skin:     General: Skin is warm.      Coloration: Skin is not jaundiced.   Neurological:      General: No focal deficit present.      Mental Status: She is alert and oriented to person, place, and time.      Cranial Nerves: No cranial nerve deficit.            Assessment and Plan     1. Abdominal hernia without obstruction and without gangrene, recurrence not specified, unspecified hernia type  -     Ambulatory referral/consult to General Surgery; Future; Expected date: 04/01/2024         No lifting over 20 lb indefinitely.  We will refer to the hernia specialist in [place] so patient can establish care; she is still deciding whether she wants to have surgery or not.  Follow back up in 6 months       No follow-ups on file.

## 2024-04-08 ENCOUNTER — TELEPHONE (OUTPATIENT)
Dept: SURGERY | Facility: CLINIC | Age: 64
End: 2024-04-08
Payer: COMMERCIAL

## 2024-04-08 NOTE — TELEPHONE ENCOUNTER
----- Message from Samara Dwyer sent at 4/8/2024 10:12 AM CDT -----  pt calling to speak with nurse regarding her return to work forms - call back # [phone]    Informed pt that work forms were completed and faxed this morning. Pt voiced understanding.

## 2024-04-09 ENCOUNTER — TELEPHONE (OUTPATIENT)
Dept: SURGERY | Facility: CLINIC | Age: 64
End: 2024-04-09
Payer: COMMERCIAL

## 2024-04-24 DIAGNOSIS — R23.2 HOT FLASHES: ICD-10-CM

## 2024-04-25 RX ORDER — ESTRADIOL 1 MG/1
1 TABLET ORAL
Qty: 30 TABLET | Refills: 0 | Status: SHIPPED | OUTPATIENT
Start: 2024-04-25 | End: 2024-06-07

## 2024-05-07 ENCOUNTER — HOSPITAL ENCOUNTER (OUTPATIENT)
Dept: RADIOLOGY | Facility: HOSPITAL | Age: 64
Discharge: HOME | End: 2024-05-07
Attending: FAMILY MEDICINE
Payer: COMMERCIAL

## 2024-05-07 VITALS — WEIGHT: 94 LBS | HEIGHT: 65 IN | BODY MASS INDEX: 15.66 KG/M2

## 2024-05-07 DIAGNOSIS — Z12.39 ENCOUNTER FOR SCREENING FOR MALIGNANT NEOPLASM OF BREAST, UNSPECIFIED SCREENING MODALITY: ICD-10-CM

## 2024-05-07 PROCEDURE — 77063 BREAST TOMOSYNTHESIS BI: CPT | Mod: TC

## 2024-05-07 PROCEDURE — 77067 SCR MAMMO BI INCL CAD: CPT | Mod: TC

## 2024-05-14 ENCOUNTER — PROCEDURE VISIT (OUTPATIENT)
Dept: DERMATOLOGY | Facility: CLINIC | Age: 64
End: 2024-05-14
Payer: COMMERCIAL

## 2024-05-14 VITALS — SYSTOLIC BLOOD PRESSURE: 117 MMHG | HEART RATE: 87 BPM | DIASTOLIC BLOOD PRESSURE: 73 MMHG

## 2024-05-14 DIAGNOSIS — C44.41 BASAL CELL CARCINOMA OF SCALP: Primary | ICD-10-CM

## 2024-05-14 PROCEDURE — 99499 UNLISTED E&M SERVICE: CPT | Mod: ,,, | Performed by: STUDENT IN AN ORGANIZED HEALTH CARE EDUCATION/TRAINING PROGRAM

## 2024-05-14 PROCEDURE — 13121 CMPLX RPR S/A/L 2.6-7.5 CM: CPT | Mod: 51,,, | Performed by: STUDENT IN AN ORGANIZED HEALTH CARE EDUCATION/TRAINING PROGRAM

## 2024-05-14 PROCEDURE — 17311 MOHS 1 STAGE H/N/HF/G: CPT | Mod: ,,, | Performed by: STUDENT IN AN ORGANIZED HEALTH CARE EDUCATION/TRAINING PROGRAM

## 2024-05-14 PROCEDURE — 17312 MOHS ADDL STAGE: CPT | Mod: ,,, | Performed by: STUDENT IN AN ORGANIZED HEALTH CARE EDUCATION/TRAINING PROGRAM

## 2024-05-14 RX ORDER — DOXYCYCLINE 100 MG/1
100 CAPSULE ORAL 2 TIMES DAILY
Qty: 14 CAPSULE | Refills: 0 | Status: SHIPPED | OUTPATIENT
Start: 2024-05-14

## 2024-05-14 NOTE — PATIENT INSTRUCTIONS

## 2024-05-14 NOTE — PROGRESS NOTES
Mohs Surgery Consult Note    Neyda Claire is a 63 y.o. female who is referred by SHEILA Grey for evaluation of a BCC on the scalp.     Recurrent skin cancer: No    Preoperative Risk Factors:  Current Anticoagulants: No  Endocarditis / Rheumatic Fever hx: No  Immunocompromised: No  Prosthetic joint: No  Congenital heart defect: No  Prosthetic heart valve: No  Diabetic: No  Transplant: No  Pacemaker: No  Defibrillator:  No  Prior problem with local anesthesia: No  Tobacco History: Yes]  Clindamycin Allergy: No  Pregnant: no     Transmissible Diseases:  HIV No  Hepatitis B or C  No      Exam:  Limited skin exam is normal except for a  BCC  located on the scalp  .    Pathologic Findings:  Accession # [id number]  Diagnosis: BCC    Assessment and Plan:  Treatment Options : The various treatment options for skin cancer removal were reviewed with the patient in detail. These include Mohs surgery with its high cure rate, excisional surgery, destructive treatment, radiation therapy, and various topical therapies.  Given the indications and high cure rate, the patient has agreed to proceed with Mohs.  Risks and Benefits : The rationale for Mohs was explained to the patient. The risks and benefits to therapy were discussed in detail. Specifically, the risks of infection, scarring, bleeding, dehiscence, hematoma, prolonged wound healing, incomplete removal, allergy to anesthesia, nerve injury, inability to clear the tumor, and recurrence were addressed. The treatment site was clearly identified and confirmed by the patient.    Plan:  Mohs Micrographic Surgery    Antibiotics: Doxycycline 100 mg BID x 7 days     Giles Gordillo MD   Mohs Surgery/Dermatologic Oncology

## 2024-05-14 NOTE — PROGRESS NOTES
Mohs Surgery Operative Note    Patient name: Neyda Claire  Date: 05/14/2024    Mohs accession #:   Previous dermpath accession #: [id number]  Location: [facility]   Preop diagnosis:BCC  Postop diagnosis: Same  Mohs AUC score: 9  Number of stages: 3  Preop size: 0.7 x 0.7 cm   Postop size: 1.1 x 1.1 cm   Depth of defect: periosteum   Repair type: complex   Amount of lidocaine used: 9 cc of 2%   Surgeon and Pathologist: YELITZA Gordillo MD     Indications for Mohs Surgery    Removal of the patient's tumor is complicated by the following clinical features: Clinical area critical for tissue conservation (Area M: cheeks, forehead, scalp, neck, jawline, pretibial surface), Aggressive pattern on initial pathology , and Poorly-defined clinical tumor borders    Based on my medical judgement, Mohs surgery is the most appropriate treatment for this cancer compared to other treatments. I discussed alternative treatments to Mohs surgery and specifically discussed the risks and benefits of curettage, excision with permanent sections, and foregoing treatment. The rationale for Mohs was explained to the patient and consent was obtained. The risks, benefits and alternatives to therapy were discussed in detail. Specifically, the risks of infection, scarring, bleeding, prolonged wound healing, incomplete removal, allergy to anesthesia, nerve injury and recurrence were addressed. Prior to the procedure, the treatment site was clearly identified and confirmed by the patient. All components of Universal Protocol/PAUSE Rule completed. PHOENIX Gordillo MD operated in two distinct and integrated capacities as the surgeon and pathologist.    STAGE I:  The patient was placed on the operating table. The cancer was identified and outlined. The entire surgical field was prepped with chlorhexidine The surgical site was anesthetized using Lidocaine 1% with epinephrine 1:100,000 buffered with sodium bicarbonate 8.4% in a 1:10 ratio.    The area of  clinically apparent tumor was debulked with a 2 mm curette. The layer of tissue was then surgically excised using a #15 blade and was then transferred onto a specimen sheet maintaining the orientation of the specimen. Hemostasis was obtained using monopolar electrodesiccation. The wound site was then covered with a dressing while the tissue samples were processed for examination.    The specimen was oriented, mapped and divided. Each section was then inked and processed in the Mohs lab using the Mohs protocol and submitted for frozen section. The histopathologic sections were reviewed by the surgeon in conjunction with the reference map.     Total blocks: 1 Total slides: 3    Frozen section analysis revealed: residual tumor present on stage 1. Tumor was indicated in red on the reference map.    Cell morphology: Nests of basaloid islands with abundant mucin, palisading, and retraction   Pathological Pattern: Nodular basal cell carcinoma  Depth of invasion: Periosteum  Presence of scar tissue: No  Perineural invasion: No  Actinic keratosis: No  Inflammation obscuring possible tumor presence: No    STAGE II:  The patient was prepped in the same fashion as the first stage. Using a similar technique to that described above, a thin layer of tissue was removed from all areas where tumor was visible on the previous stage. The tissue was again oriented, mapped, dyed, and processed as above. Histopathologic sections were reviewed in conjunction with the reference map.     Total blocks: 1 Total slides: 2    Residual tumor was identified and indicated in red on the reference map, identifying the location where further tissue excision was necessary.  Therefore, an additional stage of Mohs Micrographic surgery was deemed necessary. Tumor characteristics were the same as the first stage.    STAGE III:  The patient was prepped in the same fashion as the first stage. Using a similar technique to that described above, a thin layer of  tissue was removed from all areas where tumor was visible on the previous stage. The tissue was again oriented, mapped, dyed, and processed as above. Histopathologic sections were reviewed in conjunction with the reference map.     Total blocks: 1 Total slides: 1    Frozen section analysis revealed: No additional tumor identified on microscopic examination by the surgeon. Histology: No malignant cells seen in the sections examined.    Additional Histologic Findings: None    REPAIR: Complex Closure    Primary Surgeon : YELITZA Gordillo MD   Repair Size: 3.5 cm  Sutures:  4-0 monocryl; staples    Width of underminin cm   Free margin involved: no  Presence of exposed bone/cartilage/tendon/named neurovascular structure: yes, exposed bone   Use of retention sutures: No  Indication for complex repair: Wide undermining at least the width of the defect on one side needed to facilitate a lower tension closure  and exposed  bone    The defect was identified and a marking pen was used to plan the repair. The area was infiltrated with Lidocaine 1% with epinephrine 1:100,000 buffered with sodium bicarbonate 8.4% in a 1:10 ratio, prepped with chlorhexidine and draped with sterile towels.  The wound was debeveled and undermined widely to the width listed as above. Cones were excised within relaxed skin tension lines on both sides of the defect. Hemostasis was obtained using monopolar electrodesiccation. The dermis and subcutaneous tissue were then approximated using buried vertical mattress sutures. Percutaneous simple running sutures were carefully placed for maximum eversion and meticulous wound edge approximation. Careful attention was paid to avoid distorting any nearby free margins.    The wound was cleansed with saline and ointment was applied along the wound surface. A sterile pressure dressing was applied. Wound care instructions were given verbally and in writing. The patient left the operating suite in stable  condition. Patient was informed that additional refinement of the resulting surgical scar may be used as a second stage of this reconstruction.    Giles Gordillo MD   Mohs Surgery, Dermatologic Oncology

## 2024-05-21 ENCOUNTER — ANESTHESIA EVENT (OUTPATIENT)
Dept: GASTROENTEROLOGY | Facility: HOSPITAL | Age: 64
End: 2024-05-21
Payer: COMMERCIAL

## 2024-05-21 ENCOUNTER — ANESTHESIA (OUTPATIENT)
Dept: GASTROENTEROLOGY | Facility: HOSPITAL | Age: 64
End: 2024-05-21
Payer: COMMERCIAL

## 2024-05-21 ENCOUNTER — HOSPITAL ENCOUNTER (OUTPATIENT)
Dept: GASTROENTEROLOGY | Facility: HOSPITAL | Age: 64
Discharge: HOME | End: 2024-05-21
Attending: FAMILY MEDICINE | Admitting: INTERNAL MEDICINE
Payer: COMMERCIAL

## 2024-05-21 VITALS
RESPIRATION RATE: 12 BRPM | HEIGHT: 65 IN | DIASTOLIC BLOOD PRESSURE: 49 MMHG | WEIGHT: 94 LBS | HEART RATE: 74 BPM | TEMPERATURE: 98 F | BODY MASS INDEX: 15.66 KG/M2 | SYSTOLIC BLOOD PRESSURE: 109 MMHG | OXYGEN SATURATION: 97 %

## 2024-05-21 DIAGNOSIS — Z12.11 COLON CANCER SCREENING: ICD-10-CM

## 2024-05-21 DIAGNOSIS — D12.3 ADENOMATOUS POLYP OF TRANSVERSE COLON: Primary | ICD-10-CM

## 2024-05-21 PROCEDURE — C1889 IMPLANT/INSERT DEVICE, NOC: HCPCS

## 2024-05-21 PROCEDURE — 25000003 PHARM REV CODE 250: Performed by: NURSE ANESTHETIST, CERTIFIED REGISTERED

## 2024-05-21 PROCEDURE — 45385 COLONOSCOPY W/LESION REMOVAL: CPT | Mod: 33,59,, | Performed by: INTERNAL MEDICINE

## 2024-05-21 PROCEDURE — 45390 COLONOSCOPY W/RESECTION: CPT | Mod: PT | Performed by: INTERNAL MEDICINE

## 2024-05-21 PROCEDURE — 37000008 HC ANESTHESIA 1ST 15 MINUTES

## 2024-05-21 PROCEDURE — 88305 TISSUE EXAM BY PATHOLOGIST: CPT | Mod: 26,,, | Performed by: PATHOLOGY

## 2024-05-21 PROCEDURE — 63600175 PHARM REV CODE 636 W HCPCS: Performed by: NURSE ANESTHETIST, CERTIFIED REGISTERED

## 2024-05-21 PROCEDURE — 88305 TISSUE EXAM BY PATHOLOGIST: CPT | Mod: TC,91,SUR | Performed by: INTERNAL MEDICINE

## 2024-05-21 PROCEDURE — D9220A PRA ANESTHESIA: Mod: 33,,, | Performed by: NURSE ANESTHETIST, CERTIFIED REGISTERED

## 2024-05-21 PROCEDURE — 27201423 OPTIME MED/SURG SUP & DEVICES STERILE SUPPLY

## 2024-05-21 PROCEDURE — 45380 COLONOSCOPY AND BIOPSY: CPT | Mod: PT,59 | Performed by: INTERNAL MEDICINE

## 2024-05-21 PROCEDURE — 37000009 HC ANESTHESIA EA ADD 15 MINS

## 2024-05-21 PROCEDURE — 45390 COLONOSCOPY W/RESECTION: CPT | Mod: 33,,, | Performed by: INTERNAL MEDICINE

## 2024-05-21 RX ORDER — LIDOCAINE HYDROCHLORIDE 20 MG/ML
INJECTION, SOLUTION EPIDURAL; INFILTRATION; INTRACAUDAL; PERINEURAL
Status: DISCONTINUED | OUTPATIENT
Start: 2024-05-21 | End: 2024-05-21

## 2024-05-21 RX ORDER — SODIUM CHLORIDE 0.9 % (FLUSH) 0.9 %
10 SYRINGE (ML) INJECTION
Status: DISCONTINUED | OUTPATIENT
Start: 2024-05-21 | End: 2024-05-22 | Stop reason: HOSPADM

## 2024-05-21 RX ORDER — PROPOFOL 10 MG/ML
VIAL (ML) INTRAVENOUS
Status: DISCONTINUED | OUTPATIENT
Start: 2024-05-21 | End: 2024-05-21

## 2024-05-21 RX ADMIN — PROPOFOL 100 MG: 10 INJECTION, EMULSION INTRAVENOUS at 11:05

## 2024-05-21 RX ADMIN — PROPOFOL 80 MG: 10 INJECTION, EMULSION INTRAVENOUS at 11:05

## 2024-05-21 RX ADMIN — PROPOFOL 50 MG: 10 INJECTION, EMULSION INTRAVENOUS at 11:05

## 2024-05-21 RX ADMIN — LIDOCAINE HYDROCHLORIDE 80 MG: 20 INJECTION, SOLUTION INTRAVENOUS at 10:05

## 2024-05-21 RX ADMIN — SODIUM CHLORIDE: 9 INJECTION, SOLUTION INTRAVENOUS at 10:05

## 2024-05-21 RX ADMIN — PROPOFOL 100 MG: 10 INJECTION, EMULSION INTRAVENOUS at 10:05

## 2024-05-21 NOTE — H&P
Gastroenterology Pre-procedure H&P    History of Present Illness    Neyda Claire is a 63 y.o. female that  has a past medical history of Bipolar disorder, unspecified, Cancer, COPD (chronic obstructive pulmonary disease), and Mental disorder.     Patient here for routine screening. Has chronic abdominal pain related to adhesions from prior surgeries. Intermittent constipation.     Patient denies wt loss, diarrhea, melena/hematochezia, change in stool caliber, no anticoagulants, FMHx of GI related malignancies.      Past Medical History:   Diagnosis Date    Bipolar disorder, unspecified     Cancer     basal cell carcinoma top of head, cervical ca at age 16    COPD (chronic obstructive pulmonary disease)     Mental disorder        Past Surgical History:   Procedure Laterality Date    APPENDECTOMY  12/13/2023    Procedure: APPENDECTOMY;  Surgeon: Hebert Busby DO;  Location: [facility] OR;  Service: General;;    CHOLANGIOGRAM N/A 12/13/2023    Procedure: CHOLANGIOGRAM;  Surgeon: Hebert Busby DO;  Location: [facility] OR;  Service: General;  Laterality: N/A;    CHOLECYSTECTOMY  12/13/2023    Procedure: CHOLECYSTECTOMY;  Surgeon: Hebert Busby DO;  Location: [facility] OR;  Service: General;;    EXCISION, SMALL INTESTINE N/A 12/13/2023    Procedure: SMALL BOWEL RESECTION;  Surgeon: Hebert Busby DO;  Location: [facility] OR;  Service: General;  Laterality: N/A;    HYSTERECTOMY      LAPAROSCOPY  12/13/2023    Procedure: LAPAROSCOPY;  Surgeon: Hebert Busby DO;  Location: [facility] OR;  Service: General;;    LYSIS OF ADHESIONS  12/13/2023    Procedure: LYSIS, ADHESIONS;  Surgeon: Hebert Busby DO;  Location: [facility] OR;  Service: General;;    OOPHORECTOMY      REPAIR, HERNIA, INGUINAL  12/13/2023    Procedure: REPAIR, HERNIA, INGUINAL;  Surgeon: Hebert Busby DO;  Location: [facility] OR;  Service: General;;    TUBAL LIGATION         Family History   Problem Relation Name Age of Onset    Breast  cancer Mother      Stroke Mother      Hypertension Mother      No Known Problems Father      Tuberculosis Paternal Grandfather      Liver cancer Brother         Social History     Socioeconomic History    Marital status: Single   Tobacco Use    Smoking status: Some Days     Current packs/day: 0.50     Average packs/day: 0.5 packs/day for 15.0 years (7.5 ttl pk-yrs)     Types: Cigarettes     Passive exposure: Current    Smokeless tobacco: Never   Substance and Sexual Activity    Alcohol use: Not Currently    Drug use: Never    Sexual activity: Not Currently     Birth control/protection: See Surgical Hx     Social Determinants of Health     Financial Resource Strain: Low Risk  (12/14/2023)    Overall Financial Resource Strain (CARDIA)     Difficulty of Paying Living Expenses: Not hard at all   Food Insecurity: No Food Insecurity (12/14/2023)    Hunger Vital Sign     Worried About Running Out of Food in the Last Year: Never true     Ran Out of Food in the Last Year: Never true   Transportation Needs: No Transportation Needs (12/14/2023)    PRAPARE - Transportation     Lack of Transportation (Medical): No     Lack of Transportation (Non-Medical): No   Physical Activity: Inactive (12/14/2023)    Exercise Vital Sign     Days of Exercise per Week: 0 days     Minutes of Exercise per Session: 0 min   Stress: Stress Concern Present (12/14/2023)    [employer] - Occupational Stress Questionnaire     Feeling of Stress : To some extent   Housing Stability: Low Risk  (12/14/2023)    Housing Stability Vital Sign     Unable to Pay for Housing in the Last Year: No     Number of Places Lived in the Last Year: 1     Unstable Housing in the Last Year: No       Current Outpatient Medications   Medication Sig Dispense Refill    albuterol (VENTOLIN HFA) 90 mcg/actuation inhaler Inhale 2 puffs into the lungs every 4 (four) hours as needed for Wheezing or Shortness of Breath. Rescue 18 g 11     "albuterol-ipratropium (DUO-NEB) 2.5 mg-0.5 mg/3 mL nebulizer solution Take 3 mLs by nebulization every 4 (four) hours as needed for Wheezing. Rescue (Patient not taking: Reported on 12/29/2023) 300 mL 0    ciprofloxacin HCl (CIPRO) 500 MG tablet Take 1 tablet (500 mg total) by mouth every 12 (twelve) hours. 14 tablet 0    docusate sodium (COLACE) 100 MG capsule Take 1 capsule (100 mg total) by mouth 2 (two) times daily. 28 capsule 0    doxycycline (VIBRAMYCIN) 100 MG Cap Take 1 capsule (100 mg total) by mouth 2 (two) times daily. 14 capsule 0    estradioL (ESTRACE) 1 MG tablet Take 1 tablet by mouth once daily 30 tablet 0    HYDROcodone-acetaminophen (NORCO) 7.5-325 mg per tablet Take 1 tablet by mouth every 6 (six) hours as needed for Pain. (Patient not taking: Reported on 12/29/2023) 12 tablet 0    meloxicam (MOBIC) 7.5 MG tablet Take 1 tablet (7.5 mg total) by mouth once daily. 30 tablet 2    mupirocin (BACTROBAN) 2 % ointment Apply topically once daily. (Patient not taking: Reported on 4/1/2024) 30 g 12    promethazine (PHENERGAN) 25 MG tablet TAKE 1 TABLET BY MOUTH EVERY 6 HOURS AS NEEDED FOR NAUSEA (Patient not taking: Reported on 4/1/2024) 15 tablet 0    QUEtiapine (SEROQUEL) 50 MG tablet Take 1 tablet (50 mg total) by mouth every evening. And may increase to 2 tablets in the evening. (Patient not taking: Reported on 4/1/2024) 60 tablet 11     No current facility-administered medications for this encounter.       Review of patient's allergies indicates:   Allergen Reactions    Sulfa (sulfonamide antibiotics)        Objective:  Vitals:    05/21/24 0917   BP: 105/65   Pulse: 81   Resp: 16   Temp: 97.6 °F (36.4 °C)   TempSrc: Oral   SpO2: 95%   Weight: 42.6 kg (94 lb)   Height: 5' 5" (1.651 m)        GEN: normal appearing, NAD, AAO x3  HENT: NCAT, anicteric, OP benign  CV: normal rate, regular rhythm  RESP: NABS, symmetric rise, unlabored  ABD: soft, ND, no guarding or TTP  SKIN: warm and dry  NEURO: grossly " afocal    Assessment and Plan:    Proceed with:    Colonoscopy for screening for colon cancer    Reji Delgado MD  Gastroenterology

## 2024-05-21 NOTE — DISCHARGE INSTRUCTIONS
Procedure Date  5/21/24     Impression  Overall Impression:   One polyp measuring 20 mm or greater in the transverse colon; lift performed; removed in piecemeal fashion by EMR; placed 3 clips successfully; hemostasis achieved; tattooed 6 cm distal to the finding  One polyp measuring 10-19 mm in the sigmoid colon; performed hot snare removal  Scattered diverticulosis of moderate severity in the descending colon and sigmoid colon  The terminal ileum, ileocecal valve, cecum and ascending colon appeared normal. Performed random biopsy using biopsy forceps.  (grade 2) hemorrhoids        Recommendation  Await pathology results   Repeat colonoscopy in 6-9 months for surveillance of piecemeal EMR site      Outcome of procedure: successful Colonoscopy  Disposition: patient to recovery following procedure; discharge to home when appropriate parameters met  Provisions for follow up: please call my office for any unexpected symptoms like chest or abdominal pain or bleeding following your procedure.  Final Diagnosis: colon polyp    NO DRIVING, OPERATING EQUIPMENT, OR SIGNING LEGAL DOCUMENTS FOR 24 HOURS.THE NURSE WILL CALL YOU WITH YOUR BIOPSY RESULTS IN A FEW DAYS. IF YOU HAVE  [facility] MYCHART YOUR RESULTS WILL APPEAR THERE AS WELL.

## 2024-05-21 NOTE — TRANSFER OF CARE
"Anesthesia Transfer of Care Note    Patient: Neyda Claire    Procedure(s) Performed: * No procedures listed *    Patient location: GI    Anesthesia Type: MAC    Transport from OR: Transported from OR on room air with adequate spontaneous ventilation    Post pain: adequate analgesia    Post assessment: no apparent anesthetic complications    Post vital signs: stable    Level of consciousness: sedated and responds to stimulation    Nausea/Vomiting: no nausea/vomiting    Complications: none    Transfer of care protocol was followedComments: Good SV continue, NAD noted, VSS, RTRN      Last vitals: Visit Vitals  /80 (BP Location: Right arm, Patient Position: Lying)   Pulse 78   Temp 36.6 °C (97.8 °F) (Oral)   Resp 15   Ht 5' 5" (1.651 m)   Wt 42.6 kg (94 lb)   SpO2 98%   Breastfeeding No   BMI 15.64 kg/m²     "

## 2024-05-21 NOTE — ANESTHESIA PREPROCEDURE EVALUATION
05/21/2024  Neyda Claire is a 63 y.o., female.      Pre-op Assessment    I have reviewed the Patient Summary Reports.     I have reviewed the Nursing Notes. I have reviewed the NPO Status.   I have reviewed the Medications.     Review of Systems  Anesthesia Hx:   History of prior surgery of interest to airway management or planning:          Denies Family Hx of Anesthesia complications.    Denies Personal Hx of Anesthesia complications.                    Pulmonary:   COPD         Chronic Obstructive Pulmonary Disease (COPD):                      Psych:  Psychiatric History                Active Ambulatory Problems     Diagnosis Date Noted    Weight loss 02/22/2023    Epigastric pain 02/22/2023    Hot flashes 02/22/2023    Fever 08/07/2023    Depression 08/07/2023    Bipolar depression 08/07/2023    Vitamin D deficiency 08/07/2023    Insomnia 08/07/2023    Cholelithiasis with acute cholecystitis 12/14/2023    S/P small bowel resection 12/14/2023    Hypokalemia 12/16/2023    Hypophosphatemia 12/16/2023     Resolved Ambulatory Problems     Diagnosis Date Noted    Rectal bleeding 02/22/2023     Past Medical History:   Diagnosis Date    Bipolar disorder, unspecified     Cancer     COPD (chronic obstructive pulmonary disease)     Mental disorder       Review of patient's allergies indicates:   Allergen Reactions    Sulfa (sulfonamide antibiotics)       Current Outpatient Medications on File Prior to Encounter   Medication Sig Dispense Refill    albuterol (VENTOLIN HFA) 90 mcg/actuation inhaler Inhale 2 puffs into the lungs every 4 (four) hours as needed for Wheezing or Shortness of Breath. Rescue 18 g 11    albuterol-ipratropium (DUO-NEB) 2.5 mg-0.5 mg/3 mL nebulizer solution Take 3 mLs by nebulization every 4 (four) hours as needed for Wheezing. Rescue (Patient not taking: Reported on 12/29/2023) 300 mL 0     ciprofloxacin HCl (CIPRO) 500 MG tablet Take 1 tablet (500 mg total) by mouth every 12 (twelve) hours. 14 tablet 0    docusate sodium (COLACE) 100 MG capsule Take 1 capsule (100 mg total) by mouth 2 (two) times daily. 28 capsule 0    doxycycline (VIBRAMYCIN) 100 MG Cap Take 1 capsule (100 mg total) by mouth 2 (two) times daily. 14 capsule 0    estradioL (ESTRACE) 1 MG tablet Take 1 tablet by mouth once daily 30 tablet 0    HYDROcodone-acetaminophen (NORCO) 7.5-325 mg per tablet Take 1 tablet by mouth every 6 (six) hours as needed for Pain. (Patient not taking: Reported on 12/29/2023) 12 tablet 0    meloxicam (MOBIC) 7.5 MG tablet Take 1 tablet (7.5 mg total) by mouth once daily. 30 tablet 2    mupirocin (BACTROBAN) 2 % ointment Apply topically once daily. (Patient not taking: Reported on 4/1/2024) 30 g 12    promethazine (PHENERGAN) 25 MG tablet TAKE 1 TABLET BY MOUTH EVERY 6 HOURS AS NEEDED FOR NAUSEA (Patient not taking: Reported on 4/1/2024) 15 tablet 0    QUEtiapine (SEROQUEL) 50 MG tablet Take 1 tablet (50 mg total) by mouth every evening. And may increase to 2 tablets in the evening. (Patient not taking: Reported on 4/1/2024) 60 tablet 11     No current facility-administered medications on file prior to encounter.        Physical Exam  General: Well nourished    Airway:  Mallampati: II   Mouth Opening: Normal  TM Distance: Normal, at least 6 cm  Tongue: Normal  Neck ROM: Normal ROM        Anesthesia Plan  Type of Anesthesia, risks & benefits discussed:    Anesthesia Type: MAC  Intra-op Monitoring Plan: Standard ASA Monitors  Post Op Pain Control Plan: multimodal analgesia  Induction:  IV  Informed Consent: Informed consent signed with the Patient and all parties understand the risks and agree with anesthesia plan.  All questions answered. Patient consented to blood products? No  ASA Score: 3  Day of Surgery Review of History & Physical: H&P Update referred to the surgeon/provider.I have interviewed and  examined the patient. I have reviewed the patient's H&P dated: There are no significant changes.     Ready For Surgery From Anesthesia Perspective.     .

## 2024-05-22 LAB
ESTROGEN SERPL-MCNC: NORMAL PG/ML
INSULIN SERPL-ACNC: NORMAL U[IU]/ML
LAB AP GROSS DESCRIPTION: NORMAL
LAB AP LABORATORY NOTES: NORMAL
T3RU NFR SERPL: NORMAL %

## 2024-05-23 ENCOUNTER — CLINICAL SUPPORT (OUTPATIENT)
Dept: DERMATOLOGY | Facility: CLINIC | Age: 64
End: 2024-05-23
Payer: COMMERCIAL

## 2024-05-23 DIAGNOSIS — Z48.02 ENCOUNTER FOR REMOVAL OF SUTURES: Primary | ICD-10-CM

## 2024-05-23 PROCEDURE — 99024 POSTOP FOLLOW-UP VISIT: CPT | Mod: ,,, | Performed by: STUDENT IN AN ORGANIZED HEALTH CARE EDUCATION/TRAINING PROGRAM

## 2024-05-23 NOTE — PROGRESS NOTES
Mohs accession #:   Previous dermpath accession #: [id number]  Location: [facility]   Preop diagnosis:BCC  Postop diagnosis: Same  Mohs AUC score: 9  Number of stages: 3  Preop size: 0.7 x 0.7 cm   Postop size: 1.1 x 1.1 cm   Depth of defect: periosteum   Repair type: complex     Staples removed. Patient tolerated well. Incision site healing well. No s/s of infection. No complaints from patient.     Saba Nicholas RN

## 2024-06-01 DIAGNOSIS — R23.2 HOT FLASHES: ICD-10-CM

## 2024-06-07 RX ORDER — ESTRADIOL 1 MG/1
1 TABLET ORAL DAILY
Qty: 30 TABLET | Refills: 0 | Status: SHIPPED | OUTPATIENT
Start: 2024-06-07 | End: 2024-09-05

## 2024-06-23 RX ORDER — PROMETHAZINE HYDROCHLORIDE 25 MG/1
25 TABLET ORAL EVERY 6 HOURS PRN
Qty: 15 TABLET | Refills: 0 | Status: SHIPPED | OUTPATIENT
Start: 2024-06-23

## 2024-07-16 RX ORDER — PROMETHAZINE HYDROCHLORIDE 25 MG/1
25 TABLET ORAL EVERY 6 HOURS PRN
Qty: 15 TABLET | Refills: 0 | Status: SHIPPED | OUTPATIENT
Start: 2024-07-16

## 2024-08-08 ENCOUNTER — TELEPHONE (OUTPATIENT)
Dept: GASTROENTEROLOGY | Facility: CLINIC | Age: 64
End: 2024-08-08
Payer: COMMERCIAL

## 2024-09-10 DIAGNOSIS — K92.1 BLOOD IN STOOL: Primary | ICD-10-CM

## 2024-09-10 DIAGNOSIS — R10.84 PAIN, ABDOMINAL, GENERALIZED: ICD-10-CM

## 2024-10-14 ENCOUNTER — OFFICE VISIT (OUTPATIENT)
Dept: FAMILY MEDICINE | Facility: CLINIC | Age: 64
End: 2024-10-14
Payer: COMMERCIAL

## 2024-10-14 VITALS
SYSTOLIC BLOOD PRESSURE: 125 MMHG | HEIGHT: 65 IN | BODY MASS INDEX: 13.99 KG/M2 | OXYGEN SATURATION: 94 % | HEART RATE: 87 BPM | RESPIRATION RATE: 14 BRPM | WEIGHT: 84 LBS | DIASTOLIC BLOOD PRESSURE: 77 MMHG | TEMPERATURE: 98 F

## 2024-10-14 DIAGNOSIS — F31.9 BIPOLAR DEPRESSION: ICD-10-CM

## 2024-10-14 DIAGNOSIS — J20.9 ACUTE BRONCHITIS, UNSPECIFIED ORGANISM: ICD-10-CM

## 2024-10-14 DIAGNOSIS — J44.9 CHRONIC OBSTRUCTIVE PULMONARY DISEASE, UNSPECIFIED COPD TYPE: Primary | ICD-10-CM

## 2024-10-14 DIAGNOSIS — R05.3 CHRONIC COUGH: ICD-10-CM

## 2024-10-14 PROCEDURE — 1159F MED LIST DOCD IN RCRD: CPT | Mod: ,,, | Performed by: NURSE PRACTITIONER

## 2024-10-14 PROCEDURE — 99214 OFFICE O/P EST MOD 30 MIN: CPT | Mod: ,,, | Performed by: NURSE PRACTITIONER

## 2024-10-14 PROCEDURE — 3008F BODY MASS INDEX DOCD: CPT | Mod: ,,, | Performed by: NURSE PRACTITIONER

## 2024-10-14 PROCEDURE — 3074F SYST BP LT 130 MM HG: CPT | Mod: ,,, | Performed by: NURSE PRACTITIONER

## 2024-10-14 PROCEDURE — 3078F DIAST BP <80 MM HG: CPT | Mod: ,,, | Performed by: NURSE PRACTITIONER

## 2024-10-14 PROCEDURE — 1160F RVW MEDS BY RX/DR IN RCRD: CPT | Mod: ,,, | Performed by: NURSE PRACTITIONER

## 2024-10-14 RX ORDER — TIZANIDINE 4 MG/1
4 TABLET ORAL EVERY 8 HOURS
COMMUNITY
Start: 2024-10-08 | End: 2024-12-07

## 2024-10-14 RX ORDER — ALBUTEROL SULFATE 90 UG/1
2 INHALANT RESPIRATORY (INHALATION) EVERY 4 HOURS PRN
Qty: 18 G | Refills: 11 | Status: SHIPPED | OUTPATIENT
Start: 2024-10-14

## 2024-10-14 RX ORDER — CLONAZEPAM 1 MG/1
1 TABLET ORAL 2 TIMES DAILY
COMMUNITY
Start: 2024-05-07

## 2024-10-14 RX ORDER — MONTELUKAST SODIUM 10 MG/1
10 TABLET ORAL NIGHTLY
Qty: 90 TABLET | Refills: 1 | Status: SHIPPED | OUTPATIENT
Start: 2024-10-14

## 2024-10-14 RX ORDER — TRAZODONE HYDROCHLORIDE 100 MG/1
100 TABLET ORAL NIGHTLY
COMMUNITY
Start: 2024-08-09

## 2024-10-14 RX ORDER — PANTOPRAZOLE SODIUM 40 MG/1
40 TABLET, DELAYED RELEASE ORAL DAILY
COMMUNITY
Start: 2024-08-20 | End: 2024-11-18

## 2024-10-14 RX ORDER — BENZONATATE 100 MG/1
100 CAPSULE ORAL 3 TIMES DAILY PRN
Qty: 90 CAPSULE | Refills: 1 | Status: SHIPPED | OUTPATIENT
Start: 2024-10-14

## 2024-10-14 RX ORDER — QUETIAPINE FUMARATE 25 MG/1
25 TABLET, FILM COATED ORAL NIGHTLY
Qty: 30 TABLET | Refills: 0 | Status: SHIPPED | OUTPATIENT
Start: 2024-10-14

## 2024-10-14 RX ORDER — PREDNISOLONE ACETATE 10 MG/ML
1 SUSPENSION/ DROPS OPHTHALMIC
COMMUNITY
Start: 2024-05-01

## 2024-10-14 NOTE — PROGRESS NOTES
"[facility]    Chief Complaint      Chief Complaint   Patient presents with    Establish Care     Patient reports feeling fatigue,with no energy  having a chronic cough and weigh tloss ( patient weighed 94lbs in may ) 84lbs today patient states eh has been very emotional lately , patient reports maggy was diagnosed Bipolar while going through a divorce in 2000 , however states everything balanced out but is now having " Mood Swings " lately.     PHQ score -14       History of Present Illness      Neyda Claire is a 63 y.o. female. She  has a past medical history of Bipolar disorder, unspecified, Cancer, COPD (chronic obstructive pulmonary disease), and Mental disorder., who presents today for Establish care with multiple complaints.  Reports fatigue, chronic cough, weight loss, depression, and back pain.  Reports diagnosed with Bipolar D/O in 2000.  PHQ-9- 14.  Has history of COPD but states she has not seen a Pulmonologist.     Past Medical History:  Past Medical History:   Diagnosis Date    Bipolar disorder, unspecified     Cancer     basal cell carcinoma top of head, cervical ca at age 16    COPD (chronic obstructive pulmonary disease)     Mental disorder        Past Surgical History:   has a past surgical history that includes Hysterectomy; Tubal ligation; Laparoscopy (12/13/2023); repair, hernia, inguinal (12/13/2023); excision, small intestine (N/A, 12/13/2023); Appendectomy (12/13/2023); Cholangiogram (N/A, 12/13/2023); Cholecystectomy (12/13/2023); Lysis of adhesions (12/13/2023); and Oophorectomy.    Social History:  Social History     Tobacco Use    Smoking status: Some Days     Current packs/day: 0.50     Average packs/day: 0.5 packs/day for 15.0 years (7.5 ttl pk-yrs)     Types: Cigarettes     Passive exposure: Current    Smokeless tobacco: Never   Substance Use Topics    Alcohol use: Not Currently    Drug use: Never       I personally reviewed all past medical, surgical, and social.     Review of " Systems   Constitutional:  Positive for chills and fever.   HENT:  Positive for ear pain, postnasal drip, rhinorrhea and sore throat.    Respiratory:  Positive for cough, shortness of breath and wheezing.    Cardiovascular:  Positive for chest pain.   Gastrointestinal:  Negative for abdominal pain, diarrhea, nausea and vomiting.   Musculoskeletal:  Positive for myalgias.   Skin:  Negative for rash.   Neurological:  Positive for headaches.        Medications:  Outpatient Encounter Medications as of 10/14/2024   Medication Sig Dispense Refill    clonazePAM (KLONOPIN) 1 MG tablet Take 1 mg by mouth 2 (two) times daily.      meloxicam (MOBIC) 7.5 MG tablet Take 1 tablet (7.5 mg total) by mouth once daily. 30 tablet 2    pantoprazole (PROTONIX) 40 MG tablet Take 40 mg by mouth once daily.      prednisoLONE acetate (PRED FORTE) 1 % DrpS Apply 1 drop to eye.      promethazine (PHENERGAN) 25 MG tablet TAKE 1 TABLET BY MOUTH EVERY 6 HOURS AS NEEDED FOR NAUSEA 15 tablet 0    tiZANidine (ZANAFLEX) 4 MG tablet Take 4 mg by mouth every 8 (eight) hours.      traZODone (DESYREL) 100 MG tablet Take 100 mg by mouth every evening.      [DISCONTINUED] albuterol (VENTOLIN HFA) 90 mcg/actuation inhaler Inhale 2 puffs into the lungs every 4 (four) hours as needed for Wheezing or Shortness of Breath. Rescue 18 g 11    albuterol (VENTOLIN HFA) 90 mcg/actuation inhaler Inhale 2 puffs into the lungs every 4 (four) hours as needed for Wheezing or Shortness of Breath. Rescue 18 g 11    benzonatate (TESSALON) 100 MG capsule Take 1 capsule (100 mg total) by mouth 3 (three) times daily as needed for Cough. 90 capsule 1    montelukast (SINGULAIR) 10 mg tablet Take 1 tablet (10 mg total) by mouth every evening. 90 tablet 1    QUEtiapine (SEROQUEL) 25 MG Tab Take 1 tablet (25 mg total) by mouth every evening. 30 tablet 0    [DISCONTINUED] albuterol-ipratropium (DUO-NEB) 2.5 mg-0.5 mg/3 mL nebulizer solution Take 3 mLs by nebulization every 4 (four)  "hours as needed for Wheezing. Rescue (Patient not taking: Reported on 10/14/2024) 300 mL 0    [DISCONTINUED] ciprofloxacin HCl (CIPRO) 500 MG tablet Take 1 tablet (500 mg total) by mouth every 12 (twelve) hours. 14 tablet 0    [DISCONTINUED] docusate sodium (COLACE) 100 MG capsule Take 1 capsule (100 mg total) by mouth 2 (two) times daily. 28 capsule 0    [DISCONTINUED] doxycycline (VIBRAMYCIN) 100 MG Cap Take 1 capsule (100 mg total) by mouth 2 (two) times daily. 14 capsule 0    [DISCONTINUED] estradioL (ESTRACE) 1 MG tablet Take 1 tablet (1 mg total) by mouth once daily. 30 tablet 0    [DISCONTINUED] HYDROcodone-acetaminophen (NORCO) 7.5-325 mg per tablet Take 1 tablet by mouth every 6 (six) hours as needed for Pain. (Patient not taking: Reported on 12/29/2023) 12 tablet 0    [DISCONTINUED] mupirocin (BACTROBAN) 2 % ointment Apply topically once daily. (Patient not taking: Reported on 4/1/2024) 30 g 12    [DISCONTINUED] QUEtiapine (SEROQUEL) 50 MG tablet Take 1 tablet (50 mg total) by mouth every evening. And may increase to 2 tablets in the evening. (Patient not taking: Reported on 4/1/2024) 60 tablet 11     No facility-administered encounter medications on file as of 10/14/2024.       Allergies:  Review of patient's allergies indicates:   Allergen Reactions    Sulfa (sulfonamide antibiotics)        Health Maintenance:  Immunization History   Administered Date(s) Administered    COVID-19, MRNA, LN-S, PF (MODERNA FULL 0.5 ML DOSE) 03/12/2021, 04/09/2021    Tdap 06/17/2019      Health Maintenance   Topic Date Due    Hepatitis C Screening  Never done    Shingles Vaccine (1 of 2) Never done    Mammogram  05/07/2025    Colorectal Cancer Screening  05/21/2025    Lipid Panel  02/22/2028    TETANUS VACCINE  06/17/2029        Physical Exam      Vital Signs  Temp: 98.3 °F (36.8 °C)  Temp Source: Oral  Pulse: 87  Resp: 14  SpO2: (!) 94 %  BP: 125/77  Pain Score: 0-No pain  Height and Weight  Height: 5' 5" (165.1 " cm)  Weight: 38.1 kg (84 lb)  BSA (Calculated - sq m): 1.32 sq meters  BMI (Calculated): 14  Weight in (lb) to have BMI = 25: 149.9]    Physical Exam  Vitals and nursing note reviewed.   Constitutional:       General: She is not in acute distress.     Appearance: She is well-developed and underweight.   HENT:      Head: Normocephalic.      Right Ear: Hearing normal.      Left Ear: Hearing normal.      Nose: Nose normal.   Eyes:      General: Lids are normal.      Conjunctiva/sclera: Conjunctivae normal.   Cardiovascular:      Rate and Rhythm: Normal rate and regular rhythm.      Heart sounds: Normal heart sounds.   Pulmonary:      Effort: Pulmonary effort is normal.      Breath sounds: Normal breath sounds.   Musculoskeletal:         General: Normal range of motion.      Cervical back: Normal range of motion and neck supple.   Skin:     General: Skin is warm and dry.   Neurological:      Mental Status: She is alert and oriented to person, place, and time.      Gait: Gait is intact.   Psychiatric:         Behavior: Behavior is cooperative.          Laboratory:  CBC:  Recent Labs   Lab 12/18/23  0359 12/19/23  0258 12/20/23  0413   WBC 9.48 10.87 10.95   RBC 4.28 4.33 4.30   Hemoglobin 13.4 13.4 13.3   Hematocrit 40.1 39.9 38.6   Platelet Count 213 256 294   MCV 93.7 92.1 89.8   MCH 31.3 H 30.9 30.9   MCHC 33.4 33.6 34.5     CMP:  Recent Labs   Lab 02/22/23  1730 07/05/23  1626 12/12/23  1330 12/14/23  0315 12/20/23  0523 06/27/24  0526   Glucose 80 81 102   < > 62 L  --    Calcium 9.1 9.5 8.9   < > 7.9 L 8.6 L   Albumin 4.3 4.3 3.7  --   --   --    Total Protein 7.0 7.5 7.3  --   --   --    Sodium 139 138 135 L   < > 136 138   Potassium 3.9 4.8 3.5   < > 3.7 4.5   CO2 29 31 23   < > 24  --    Carbon Dioxide  --   --   --   --   --  26   Chloride 108 H 104 103   < > 103 106   BUN 10 10 26 H   < > 9  --    Blood Urea Nitrogen  --   --   --   --   --  8   Alk Phos 68 65 66  --   --   --    ALT 30 29 23  --   --   --     AST 20 24 21  --   --   --    Bilirubin, Total 0.5 0.3 0.6  --   --   --     < > = values in this interval not displayed.     LIPIDS:  Recent Labs   Lab 03/29/22  0919 02/22/23  1730   TSH  --  0.551   HDL Cholesterol 71 H 81 H   Cholesterol 174 189   Triglycerides 60 63   LDL Calculated 91 95   Cholesterol/HDL Ratio (Risk Factor) 2.5 2.3   Non- 108     TSH:  Recent Labs   Lab 02/22/23  1730   TSH 0.551     A1C:  Recent Labs   Lab 02/22/23  1730 07/05/23  1626   Hemoglobin A1C 5.6 5.5       Assessment/Plan     Neyda Claire is a 63 y.o.female with:     1. Chronic obstructive pulmonary disease, unspecified COPD type  -     montelukast (SINGULAIR) 10 mg tablet; Take 1 tablet (10 mg total) by mouth every evening.  Dispense: 90 tablet; Refill: 1  -     Ambulatory referral/consult to Pulmonology; Future; Expected date: 10/23/2024    2. Bipolar depression  -     QUEtiapine (SEROQUEL) 25 MG Tab; Take 1 tablet (25 mg total) by mouth every evening.  Dispense: 30 tablet; Refill: 0    3. Chronic cough  -     benzonatate (TESSALON) 100 MG capsule; Take 1 capsule (100 mg total) by mouth 3 (three) times daily as needed for Cough.  Dispense: 90 capsule; Refill: 1  -     Ambulatory referral/consult to Pulmonology; Future; Expected date: 10/23/2024    4. Acute bronchitis, unspecified organism  -     albuterol (VENTOLIN HFA) 90 mcg/actuation inhaler; Inhale 2 puffs into the lungs every 4 (four) hours as needed for Wheezing or Shortness of Breath. Rescue  Dispense: 18 g; Refill: 11         Total time spent face-to-face and non-face-to-face coordinating care for this encounter was: 30 minutes     Chronic conditions status updated as per HPI.  Other than changes above, cont current medications and maintain follow up with specialists.  Return to clinic in 3 month(s).    Samara Solano, P  [facility]  Answers submitted by the patient for this visit:  Cough Questionnaire (Submitted on 10/14/2024)  Chief Complaint:  Cough  Chronicity: chronic  Onset: more than 1 month ago  Progression since onset: waxing and waning  Frequency: every few minutes  Cough characteristics: productive of purulent sputum  ear congestion: Yes  nasal congestion: Yes  sweats: Yes  weight loss: Yes  Aggravated by: lying down, stress  bronchitis: Yes  COPD: Yes  emphysema: Yes  pneumonia: Yes  Treatments tried: OTC cough suppressant, a beta-agonist inhaler, body position changes, rest  Improvement on treatment: no relief

## 2024-11-07 ENCOUNTER — OFFICE VISIT (OUTPATIENT)
Dept: GASTROENTEROLOGY | Facility: CLINIC | Age: 64
End: 2024-11-07
Payer: COMMERCIAL

## 2024-11-07 ENCOUNTER — TELEPHONE (OUTPATIENT)
Dept: PHARMACY | Facility: CLINIC | Age: 64
End: 2024-11-07

## 2024-11-07 ENCOUNTER — OFFICE VISIT (OUTPATIENT)
Dept: PULMONOLOGY | Facility: CLINIC | Age: 64
End: 2024-11-07
Payer: COMMERCIAL

## 2024-11-07 VITALS
SYSTOLIC BLOOD PRESSURE: 120 MMHG | HEIGHT: 65 IN | DIASTOLIC BLOOD PRESSURE: 78 MMHG | HEART RATE: 92 BPM | OXYGEN SATURATION: 96 % | WEIGHT: 85 LBS | BODY MASS INDEX: 14.16 KG/M2

## 2024-11-07 VITALS
HEART RATE: 78 BPM | DIASTOLIC BLOOD PRESSURE: 84 MMHG | OXYGEN SATURATION: 95 % | HEIGHT: 65 IN | RESPIRATION RATE: 16 BRPM | SYSTOLIC BLOOD PRESSURE: 128 MMHG | BODY MASS INDEX: 13.99 KG/M2 | WEIGHT: 84 LBS

## 2024-11-07 DIAGNOSIS — R63.4 UNINTENTIONAL WEIGHT LOSS: ICD-10-CM

## 2024-11-07 DIAGNOSIS — R13.10 DYSPHAGIA, UNSPECIFIED TYPE: Primary | ICD-10-CM

## 2024-11-07 DIAGNOSIS — J44.9 CHRONIC OBSTRUCTIVE PULMONARY DISEASE, UNSPECIFIED COPD TYPE: ICD-10-CM

## 2024-11-07 DIAGNOSIS — R06.02 SHORTNESS OF BREATH: ICD-10-CM

## 2024-11-07 DIAGNOSIS — J42 CHRONIC BRONCHITIS WITH ACUTE EXACERBATION: Primary | ICD-10-CM

## 2024-11-07 DIAGNOSIS — J20.9 CHRONIC BRONCHITIS WITH ACUTE EXACERBATION: Primary | ICD-10-CM

## 2024-11-07 DIAGNOSIS — F17.210 SMOKING GREATER THAN 40 PACK YEARS: ICD-10-CM

## 2024-11-07 DIAGNOSIS — R05.3 CHRONIC COUGH: ICD-10-CM

## 2024-11-07 DIAGNOSIS — K92.1 BLOOD IN STOOL: ICD-10-CM

## 2024-11-07 DIAGNOSIS — R10.84 PAIN, ABDOMINAL, GENERALIZED: ICD-10-CM

## 2024-11-07 PROCEDURE — 3079F DIAST BP 80-89 MM HG: CPT | Mod: ,,, | Performed by: STUDENT IN AN ORGANIZED HEALTH CARE EDUCATION/TRAINING PROGRAM

## 2024-11-07 PROCEDURE — 3074F SYST BP LT 130 MM HG: CPT | Mod: ,,, | Performed by: STUDENT IN AN ORGANIZED HEALTH CARE EDUCATION/TRAINING PROGRAM

## 2024-11-07 PROCEDURE — 3008F BODY MASS INDEX DOCD: CPT | Mod: ,,, | Performed by: STUDENT IN AN ORGANIZED HEALTH CARE EDUCATION/TRAINING PROGRAM

## 2024-11-07 PROCEDURE — 99999 PR PBB SHADOW E&M-EST. PATIENT-LVL V: CPT | Mod: PBBFAC,,,

## 2024-11-07 PROCEDURE — 1160F RVW MEDS BY RX/DR IN RCRD: CPT | Mod: ,,, | Performed by: STUDENT IN AN ORGANIZED HEALTH CARE EDUCATION/TRAINING PROGRAM

## 2024-11-07 PROCEDURE — 99204 OFFICE O/P NEW MOD 45 MIN: CPT | Mod: S$PBB,,, | Performed by: STUDENT IN AN ORGANIZED HEALTH CARE EDUCATION/TRAINING PROGRAM

## 2024-11-07 PROCEDURE — 99215 OFFICE O/P EST HI 40 MIN: CPT | Mod: PBBFAC | Performed by: STUDENT IN AN ORGANIZED HEALTH CARE EDUCATION/TRAINING PROGRAM

## 2024-11-07 PROCEDURE — 3074F SYST BP LT 130 MM HG: CPT | Mod: ,,,

## 2024-11-07 PROCEDURE — 3078F DIAST BP <80 MM HG: CPT | Mod: ,,,

## 2024-11-07 PROCEDURE — 1159F MED LIST DOCD IN RCRD: CPT | Mod: ,,, | Performed by: STUDENT IN AN ORGANIZED HEALTH CARE EDUCATION/TRAINING PROGRAM

## 2024-11-07 PROCEDURE — 99215 OFFICE O/P EST HI 40 MIN: CPT | Mod: PBBFAC

## 2024-11-07 PROCEDURE — 99999 PR PBB SHADOW E&M-EST. PATIENT-LVL V: CPT | Mod: PBBFAC,,, | Performed by: STUDENT IN AN ORGANIZED HEALTH CARE EDUCATION/TRAINING PROGRAM

## 2024-11-07 PROCEDURE — 3008F BODY MASS INDEX DOCD: CPT | Mod: ,,,

## 2024-11-07 PROCEDURE — 99214 OFFICE O/P EST MOD 30 MIN: CPT | Mod: S$PBB,,,

## 2024-11-07 RX ORDER — AZITHROMYCIN 250 MG/1
TABLET, FILM COATED ORAL
Qty: 6 TABLET | Refills: 0 | Status: SHIPPED | OUTPATIENT
Start: 2024-11-07 | End: 2024-11-12

## 2024-11-07 RX ORDER — PREDNISONE 50 MG/1
50 TABLET ORAL DAILY
Qty: 7 TABLET | Refills: 0 | Status: SHIPPED | OUTPATIENT
Start: 2024-11-07

## 2024-11-07 RX ORDER — FLUTICASONE FUROATE, UMECLIDINIUM BROMIDE AND VILANTEROL TRIFENATATE 200; 62.5; 25 UG/1; UG/1; UG/1
1 POWDER RESPIRATORY (INHALATION) DAILY
Qty: 60 EACH | Refills: 11 | Status: SHIPPED | OUTPATIENT
Start: 2024-11-07

## 2024-11-07 NOTE — PROGRESS NOTES
[facility]  Pulmonology  NEW VISIT     Patient Name:  Neyda Claire  Primary Care Provider: Agustin Cummings II, MD  Date of Service: 11/7/2024   Reason for Referral:   J44.9 (ICD-10-CM) - Chronic obstructive pulmonary disease, unspecified COPD type   R05.3 (ICD-10-CM) - Chronic cough       Chief Complaint: Cough    SUBJECTIVE   HPI:  Neyda Claire is a 63 y.o. female with prior nicotine dependence and hx of COPD who presents today upon referral with complaints of cough.     [place] being a progressive cough over the past few weeks that has raised concern.  She states that she was coughing so much that she has soreness in her ribs and her back.  Her cough is productive of sputum that is opaque but not black.  She has a poor appetite as well and reports some weight loss of the past year.  She reports that she was previously diagnosed with COPD after hospitalization for an acute illness which included a pneumonia with a spot in her lung.  She expresses concern with possible cancer given her smoking history as well as her family history of lung cancer.  She would like to have screening for lung cancer.  She was previously received treatment with Trelegy which improved her breathing symptoms; at this time, she was managed with albuterol which he was on an as-needed basis.  She has had no hospitalizations over the past year for any respiratory complaints.  She had abdominal surgery in 06/2024 for hernia repair with a unremarkable postoperative course.      Past Medical History:   Diagnosis Date    Bipolar disorder, unspecified     Cancer     basal cell carcinoma top of head, cervical ca at age 16    COPD (chronic obstructive pulmonary disease)     Mental disorder        Past Surgical History:   Procedure Laterality Date    APPENDECTOMY  12/13/2023    Procedure: APPENDECTOMY;  Surgeon: Hebert Busby DO;  Location: [facility] OR;  Service: General;;    CHOLANGIOGRAM N/A 12/13/2023    Procedure: CHOLANGIOGRAM;   Surgeon: Hebert Busby DO;  Location: [facility] OR;  Service: General;  Laterality: N/A;    CHOLECYSTECTOMY  2023    Procedure: CHOLECYSTECTOMY;  Surgeon: Hebert Busby DO;  Location: [facility] OR;  Service: General;;    EXCISION, SMALL INTESTINE N/A 2023    Procedure: SMALL BOWEL RESECTION;  Surgeon: Hebert Busby DO;  Location: [facility] OR;  Service: General;  Laterality: N/A;    HYSTERECTOMY      LAPAROSCOPY  2023    Procedure: LAPAROSCOPY;  Surgeon: Hebert Busby DO;  Location: [facility] OR;  Service: General;;    LYSIS OF ADHESIONS  2023    Procedure: LYSIS, ADHESIONS;  Surgeon: Hebert Busby DO;  Location: [facility] OR;  Service: General;;    OOPHORECTOMY      REPAIR, HERNIA, INGUINAL  2023    Procedure: REPAIR, HERNIA, INGUINAL;  Surgeon: Hebert Busby DO;  Location: [facility] OR;  Service: General;;    TUBAL LIGATION         Family History   Problem Relation Name Age of Onset    Breast cancer Mother      Stroke Mother      Hypertension Mother      No Known Problems Father      Tuberculosis Paternal Grandfather      Liver cancer Brother          Social History     Socioeconomic History    Marital status: Single   Tobacco Use    Smoking status: Former     Current packs/day: 0.00     Average packs/day: 1 pack/day for 44.6 years (44.6 ttl pk-yrs)     Types: Cigarettes     Start date:      Quit date: 2019     Years since quittin.2     Passive exposure: Current    Smokeless tobacco: Never   Substance and Sexual Activity    Alcohol use: Not Currently    Drug use: Never    Sexual activity: Not Currently     Birth control/protection: See Surgical Hx     Social Drivers of Health     Financial Resource Strain: Low Risk  (10/14/2024)    Overall Financial Resource Strain (CARDIA)     Difficulty of Paying Living Expenses: Not hard at all   Food Insecurity: No Food Insecurity (10/14/2024)    Hunger Vital Sign     Worried About Running Out of Food in the  "Last Year: Never true     Ran Out of Food in the Last Year: Never true   Transportation Needs: Unknown (7/2/2024)    Received from [facility]    GARY - Transportation     Lack of Transportation (Medical): No     Lack of Transportation (Non-Medical): Patient declined   Physical Activity: Sufficiently Active (10/14/2024)    Exercise Vital Sign     Days of Exercise per Week: 5 days     Minutes of Exercise per Session: 150+ min   Stress: Stress Concern Present (10/14/2024)    [facility] [employer] [facility] - Occupational Stress Questionnaire     Feeling of Stress : Very much   Housing Stability: Unknown (10/14/2024)    Housing Stability Vital Sign     Unable to Pay for Housing in the Last Year: No       Social History     Social History Narrative    Not on file       Review of patient's allergies indicates:   Allergen Reactions    Cyclobenzaprine Anxiety, Itching, Palpitations and Shortness Of Breath    Sulfa (sulfonamide antibiotics)         Medications: Medications reviewed to include over the counter medications.    Review of Systems: A focused ROS was completed and found to be negative except for that mentioned above.      OBJECTIVE   PHYSICAL EXAM:  Vitals:    11/07/24 1529   BP: 128/84   BP Location: Left arm   Patient Position: Sitting   Pulse: 78   Resp: 16   SpO2: 95%   Weight: 38.1 kg (84 lb)   Height: 5' 5" (1.651 m)        GENERAL: NAD, thin  HEENT: normocephalic, non-icteric conjunctivae, moist oral mucosa  RESPIRATORY: clear to auscultation, expiratory wheeze, scattered inspiratory rhonchi, no rales, normal pulmonary effort, coughing during encounter  CARDIOVASCULAR: Regular rate and rhythm, no murmurs rubs or gallops  SKIN: no rash, jaundice, ecchymosis or ulcers  MUSCULOSKELETAL: No clubbing or cyanosis; no pedal edema  NEUROLOGIC: AO ×3, no gross deficits    LABS:  Lab studies reviewed and notable for CO2 26, SCr " 0.50 (06/2024), H/H 13.3/38.6, SEos 50 (12/2023)    IMAGING:  Imaging reviewed and notable for CXR 12/2022 with clear lung fields bilaterally, no nodules, no masses, no pleural effusion, no pneumothorax, normal cardiac silhouette    LUNG FUNCTION TESTING: None available to review or report      ASSESSMENT & PLAN     1. Chronic bronchitis with acute exacerbation  Assessment & Plan:  64 yo F with 45 pack years and chronic cough presents to establish care.  She was a previous history of hospitalization for pneumonia.  No exacerbation this year.  She presents today with a chronic progressive cough with associated poor appetite and gradual weight loss.  Exam is remarkable for an acute chronic bronchitis flare.  She is currently managed with albuterol which he uses as needed.  Plan for management as follows:   - prednisone burst x7 days and azithromycin Z-Mendoza  - start Trelegy 200 daily   -- 2 samples provided    -- referral for pharmacy assistance afforded due to high out-of-pocket cost previously  - continue albuterol as needed   - obtain PFT and 6MWT      2. Smoking greater than 40 pack years  Assessment & Plan:  Patient has a 45 pack-year smoking and quit in 2019 . We discussed lung cancer screening with low-dose CT including benefits (early diagnosis, reduced risk of death from lung cancer and decreased worry) and harms (false-positive findings, over-diagnosis, radiation exposure, increased anxiety) of screening and importance of adherence to the screening program with annual imaging at minimum. Patient is agreeable to lung cancer screening and to this end we will obtain a low-dose CT baseline.   - LDCT, ordered      Orders:  -     CT Chest Lung Screening Low Dose; Future; Expected date: 11/07/2024    3. Chronic cough  Assessment & Plan:  64 yo F with 45 pack years and chronic cough presents to establish care.  She was a previous history of hospitalization for pneumonia.  No exacerbation this year.  She presents today  with a chronic progressive cough with associated poor appetite and gradual weight loss.  Exam is remarkable for an acute chronic bronchitis flare.  She is currently managed with albuterol which he uses as needed.  Plan for management as follows:   - prednisone burst x7 days and azithromycin Z-Silvio  - start Trelegy 200 daily   -- 2 samples provided    -- referral for pharmacy assistance afforded due to high out-of-pocket cost previously  - continue albuterol as needed   - obtain PFT and 6MWT    Orders:  -     Ambulatory referral/consult to Pulmonology  -     Complete PFT w/ bronchodilator; Future  -     Stress test, pulmonary; Future; Expected date: 11/07/2024  -     predniSONE (DELTASONE) 50 MG Tab; Take 1 tablet (50 mg total) by mouth once daily.  Dispense: 7 tablet; Refill: 0  -     azithromycin (Z-SILVIO) 250 MG tablet; Take 2 tablets by mouth on day 1; Take 1 tablet by mouth on days 2-5  Dispense: 6 tablet; Refill: 0    4. Shortness of breath  -     Complete PFT w/ bronchodilator; Future  -     Stress test, pulmonary; Future; Expected date: 11/07/2024    5. Chronic obstructive pulmonary disease, unspecified COPD type  -     Ambulatory referral/consult to Pulmonology  -     fluticasone-umeclidin-vilanter (TRELEGY ELLIPTA) 200-62.5-25 mcg inhaler; Inhale 1 puff into the lungs once daily.  Dispense: 60 each; Refill: 11  -     Ambulatory referral/consult to Pharmacy Assistance; Future; Expected date: 11/14/2024           Follow up in about 4 weeks (around 12/5/2024) for Routine follow up.      Case was discussed with patient; all questions were answered to patient's satisfaction and patient verbalized understanding.       Marilin Anders MD  Pulmonary Medicine  [facility]  Phone: [phone]

## 2024-11-07 NOTE — LETTER
November 7, 2024    Neyda Claire  [address] [place] [address]           Dear Ms. Claire,      My name is Hailey Shannon  I am reaching out on behalf of [facility]s Pharmacy Patient Assistance Team after receiving a referral from your Provider inquiring about assistance with your medication. I tried to contact you on 11/7/2024 . Sorry we missed you. Our goal is to assist qualified patients with financial assistance for their medications to better help enrollment for their medications to better help you achieve your health goals.      Please note that enrollment into available support will require the following documents:      Proof of household Income (such as social security statement, 1099 form, pension statement or 3 consecutive pay stubs)  Copy of all insurance cards (front and back)  Print out from your insurance or pharmacy showing how much you have spent on prescriptions this year  Completed Medication Access Center Authorization Forms       Please reach out to my phone number below if you are still in need of assistance with your medications. Follow-up attempt to reach you through MyChart or letter will be made in 5 business days. We look forward to hearing from you soon!    Thank you for choosing [facility] for your healthcare needs      Sincerely  Hailey CRAWFORD @[phone]  Pharmacy Patient Assistance Team  [address] [place] [address]  [id number] [place] [id number]  Fax: [phone]  Email: [email or web address]

## 2024-11-07 NOTE — PROGRESS NOTES
CC:  No appetite, dysphagia unintentional weight loss    HPI 63 y.o. white female presents today for complaint of inability to gain weight.  Patient states in June she weighed 105 lb and she is now down to 85 lb.  She has had a loss of appetite times six-months in has to force herself to eat food.  Patient does state she drinks boost and ensure but is not having any nausea or vomiting.  Patient was a previous smoker she stopped smoking in 2019.  Patient did have a abdominal surgery due to a hernia in had a removal of her gallbladder and her appendix.  After that surgery she then had another hernia repair in her incisional midline.  Patient states that she has to force herself to eat food even though she does not want to.  Patient does admit to getting choked on pills and food at time.  Patient states that she can just get choked swallowing her own spit at times.  Colonoscopy 05/21/2024 patient had a polyp 20 mm removed 110-19 mm and move she does have scattered diverticulosis moderate severity in grade 2 hemorrhoids.  Pathology report for polyps were sessile serrated adenoma.  Patient is having a repeat colonoscopy 11/27/24 with Dr. Delgado.  Due to in his history we will plan to add an EGD to that same date she is having her colonoscopy.  I did discuss this with SHEILA Benjamin and Dr. Boland.  Patient states that this would be much easier for her to have on the same day due to transportation.    Medical records reviewed. Additional history supplemented by nursing.     Past Medical History:   Diagnosis Date    Bipolar disorder, unspecified     Cancer     basal cell carcinoma top of head, cervical ca at age 16    COPD (chronic obstructive pulmonary disease)     Mental disorder          Review of Systems  General ROS: negative for chills, fever or weight loss  Cardiovascular ROS: no chest pain or dyspnea on exertion  Gastrointestinal ROS: no abdominal pain, change in bowel habits, or black/ bloody  "stools    Physical Examination  /78   Pulse 92   Ht 5' 5" (1.651 m)   Wt 38.6 kg (85 lb)   SpO2 96%   BMI 14.14 kg/m²   General appearance: alert, cooperative, no distress  HENT: Normocephalic, atraumatic, neck symmetrical, no nasal discharge   Lungs: no labored breathing, symmetric chest wall expansion bilaterally  Heart: regular rate and rhythm without rub; no displacement of the PMI   Abdomen: soft, non-tender; bowel sounds normoactive; no organomegalyt    Labs:  Lab Results   Component Value Date    WBC 10.95 12/20/2023    HGB 13.3 12/20/2023    HCT 38.6 12/20/2023    MCV 89.8 12/20/2023     12/20/2023       CMP  Sodium   Date Value Ref Range Status   06/27/2024 138 136 - 145 mmol/L Final     Potassium   Date Value Ref Range Status   06/27/2024 4.5 3.5 - 5.1 mmol/L Final     Chloride   Date Value Ref Range Status   06/27/2024 106 100 - 109 mmol/L Final     Carbon Dioxide   Date Value Ref Range Status   06/27/2024 26 22 - 33 mmol/L Final     Glucose   Date Value Ref Range Status   12/20/2023 62 (L) 74 - 106 mg/dL Final     Blood Urea Nitrogen   Date Value Ref Range Status   06/27/2024 8 5 - 25 mg/dL Final     Creatinine   Date Value Ref Range Status   06/27/2024 0.50 (L) 0.55 - 1.02 mg/dL Final     Calcium   Date Value Ref Range Status   06/27/2024 8.6 (L) 8.8 - 10.6 mg/dL Final     Total Protein   Date Value Ref Range Status   12/12/2023 7.3 6.4 - 8.2 g/dL Final     Albumin   Date Value Ref Range Status   12/12/2023 3.7 3.5 - 5.0 g/dL Final     Bilirubin, Total   Date Value Ref Range Status   12/12/2023 0.6 >0.0 - 1.2 mg/dL Final     Alk Phos   Date Value Ref Range Status   12/12/2023 66 50 - 130 U/L Final     AST   Date Value Ref Range Status   12/12/2023 21 15 - 37 U/L Final     ALT   Date Value Ref Range Status   12/12/2023 23 13 - 56 U/L Final     Anion Gap   Date Value Ref Range Status   06/27/2024 6 (L) 8 - 16 mmol/L Final     eGFR    Date Value Ref Range Status "   06/27/2024 106 mL/min/1.73mSq Final     Comment:     In accordance with NKF-ASN Task Force recommendation, calculation based on the Chronic Kidney Disease Epidemiology Collaboration (CKD-EPI) equation without adjustment for race. eGFR adjusted for gender and age and calculated in ml/min/1.73mSquared. eGFR cannot be calculated if patient is under 18 years of age.     Reference Range:   >= 60 ml/min/1.73mSquared.       Imaging:  CT abd and pelvis 08/26/2024  1.  No acute abnormality within the abdomen or pelvis to explain   patient's symptoms.     Independently reviewed    Assessment: Neyda Claire 64 yo WF here with:  Dysphagia  Loss of appetite  Unintentional weight loss  History of sessile serrated adenomas    Plan:  EGD scheduled for dysphagia and loss of appetite with unintentional weight loss  Patient has a repeat colonoscopy on 11/27/2024 patient is aware, cave prep instructions once again to patient per her request.  Patient to follow-up 4-5 months with Shannan SOSA or sooner    30 minutes of total time spent on the encounter, which includes face to face time and non-face to face time preparing to see the patient (eg, review of tests), obtaining and/or reviewing separately obtained history, documenting clinical information in the electronic or other health record, Independently interpreting results (not separately reported) and communicating results to the patient/family/caregiver, or care coordination (not separately reported).         SHEILA Spence  [facility]

## 2024-11-07 NOTE — ASSESSMENT & PLAN NOTE
Patient has a 45 pack-year smoking and quit in 2019 . We discussed lung cancer screening with low-dose CT including benefits (early diagnosis, reduced risk of death from lung cancer and decreased worry) and harms (false-positive findings, over-diagnosis, radiation exposure, increased anxiety) of screening and importance of adherence to the screening program with annual imaging at minimum. Patient is agreeable to lung cancer screening and to this end we will obtain a low-dose CT baseline.   - LDCT, ordered

## 2024-11-17 DIAGNOSIS — F31.9 BIPOLAR DEPRESSION: ICD-10-CM

## 2024-11-18 ENCOUNTER — OFFICE VISIT (OUTPATIENT)
Dept: FAMILY MEDICINE | Facility: CLINIC | Age: 64
End: 2024-11-18
Payer: COMMERCIAL

## 2024-11-18 VITALS
DIASTOLIC BLOOD PRESSURE: 74 MMHG | TEMPERATURE: 98 F | BODY MASS INDEX: 14.23 KG/M2 | HEIGHT: 65 IN | OXYGEN SATURATION: 95 % | WEIGHT: 85.38 LBS | RESPIRATION RATE: 18 BRPM | HEART RATE: 92 BPM | SYSTOLIC BLOOD PRESSURE: 112 MMHG

## 2024-11-18 DIAGNOSIS — J44.9 CHRONIC OBSTRUCTIVE PULMONARY DISEASE, UNSPECIFIED COPD TYPE: Primary | ICD-10-CM

## 2024-11-18 DIAGNOSIS — Z11.59 NEED FOR HEPATITIS C SCREENING TEST: ICD-10-CM

## 2024-11-18 DIAGNOSIS — R05.3 CHRONIC COUGH: ICD-10-CM

## 2024-11-18 DIAGNOSIS — Z23 NEED FOR PNEUMOCOCCAL 20-VALENT CONJUGATE VACCINATION: ICD-10-CM

## 2024-11-18 DIAGNOSIS — Z11.4 SCREENING FOR HIV (HUMAN IMMUNODEFICIENCY VIRUS): ICD-10-CM

## 2024-11-18 PROCEDURE — 86803 HEPATITIS C AB TEST: CPT | Mod: ,,, | Performed by: CLINICAL MEDICAL LABORATORY

## 2024-11-18 PROCEDURE — 1160F RVW MEDS BY RX/DR IN RCRD: CPT | Mod: ,,, | Performed by: NURSE PRACTITIONER

## 2024-11-18 PROCEDURE — 3008F BODY MASS INDEX DOCD: CPT | Mod: ,,, | Performed by: NURSE PRACTITIONER

## 2024-11-18 PROCEDURE — 90471 IMMUNIZATION ADMIN: CPT | Mod: ,,, | Performed by: NURSE PRACTITIONER

## 2024-11-18 PROCEDURE — 90677 PCV20 VACCINE IM: CPT | Mod: ,,, | Performed by: NURSE PRACTITIONER

## 2024-11-18 PROCEDURE — 87389 HIV-1 AG W/HIV-1&-2 AB AG IA: CPT | Mod: ,,, | Performed by: CLINICAL MEDICAL LABORATORY

## 2024-11-18 PROCEDURE — 1159F MED LIST DOCD IN RCRD: CPT | Mod: ,,, | Performed by: NURSE PRACTITIONER

## 2024-11-18 PROCEDURE — 3078F DIAST BP <80 MM HG: CPT | Mod: ,,, | Performed by: NURSE PRACTITIONER

## 2024-11-18 PROCEDURE — 99213 OFFICE O/P EST LOW 20 MIN: CPT | Mod: 25,,, | Performed by: NURSE PRACTITIONER

## 2024-11-18 PROCEDURE — 3074F SYST BP LT 130 MM HG: CPT | Mod: ,,, | Performed by: NURSE PRACTITIONER

## 2024-11-18 RX ORDER — QUETIAPINE FUMARATE 25 MG/1
25 TABLET, FILM COATED ORAL NIGHTLY
Qty: 30 TABLET | Refills: 0 | Status: SHIPPED | OUTPATIENT
Start: 2024-11-18

## 2024-11-19 LAB
HCV AB SER QL: NORMAL
HIV 1+O+2 AB SERPL QL: NORMAL

## 2024-11-19 RX ORDER — BENZONATATE 100 MG/1
100 CAPSULE ORAL 3 TIMES DAILY PRN
Qty: 90 CAPSULE | Refills: 1 | Status: SHIPPED | OUTPATIENT
Start: 2024-11-19

## 2024-11-19 NOTE — PROGRESS NOTES
[facility] Medicine    Chief Complaint      Chief Complaint   Patient presents with    Follow-up       History of Present Illness      Neyda Claire is a 64 y.o. female. She  has a past medical history of Bipolar disorder, unspecified, Cancer, COPD (chronic obstructive pulmonary disease), and Mental disorder., who presents today for f/u exam of her COPD and unexplained weight loss.  She has been seen by both GI and Pulmonology.  She has upper and lower scopes coming up soon with Dr. Delgado.      Past Medical History:  Past Medical History:   Diagnosis Date    Bipolar disorder, unspecified     Cancer     basal cell carcinoma top of head, cervical ca at age 16    COPD (chronic obstructive pulmonary disease)     Mental disorder        Past Surgical History:   has a past surgical history that includes Hysterectomy; Tubal ligation; Laparoscopy (2023); repair, hernia, inguinal (2023); excision, small intestine (N/A, 2023); Appendectomy (2023); Cholangiogram (N/A, 2023); Cholecystectomy (2023); Lysis of adhesions (2023); and Oophorectomy.    Social History:  Social History     Tobacco Use    Smoking status: Former     Current packs/day: 0.00     Average packs/day: 1 pack/day for 44.6 years (44.6 ttl pk-yrs)     Types: Cigarettes     Start date:      Quit date: 2019     Years since quittin.3     Passive exposure: Current    Smokeless tobacco: Never   Substance Use Topics    Alcohol use: Not Currently    Drug use: Never       I personally reviewed all past medical, surgical, and social.     Review of Systems   Constitutional:  Positive for unexpected weight change. Negative for fatigue.   Eyes:  Negative for visual disturbance.   Respiratory:  Positive for cough. Negative for shortness of breath.    Cardiovascular: Negative.    Gastrointestinal:  Negative for abdominal pain.   Genitourinary:  Negative for difficulty urinating.   Musculoskeletal:  Negative for gait  problem.   Skin: Negative.    Neurological:  Negative for dizziness, light-headedness and headaches.   Psychiatric/Behavioral:  Positive for sleep disturbance.         Medications:  Outpatient Encounter Medications as of 11/18/2024   Medication Sig Dispense Refill    albuterol (VENTOLIN HFA) 90 mcg/actuation inhaler Inhale 2 puffs into the lungs every 4 (four) hours as needed for Wheezing or Shortness of Breath. Rescue 18 g 11    clonazePAM (KLONOPIN) 1 MG tablet Take 1 mg by mouth 2 (two) times daily.      ED A-HIST 4-10 mg per tablet Take 1 tablet by mouth 2 (two) times daily.      fluticasone-umeclidin-vilanter (TRELEGY ELLIPTA) 200-62.5-25 mcg inhaler Inhale 1 puff into the lungs once daily. 60 each 11    meloxicam (MOBIC) 7.5 MG tablet Take 1 tablet (7.5 mg total) by mouth once daily. 30 tablet 2    montelukast (SINGULAIR) 10 mg tablet Take 1 tablet (10 mg total) by mouth every evening. 90 tablet 1    pantoprazole (PROTONIX) 40 MG tablet Take 40 mg by mouth once daily.      QUEtiapine (SEROQUEL) 25 MG Tab Take 1 tablet by mouth in the evening 30 tablet 0    tiZANidine (ZANAFLEX) 4 MG tablet Take 4 mg by mouth every 8 (eight) hours.      traZODone (DESYREL) 100 MG tablet Take 100 mg by mouth every evening.      benzonatate (TESSALON) 100 MG capsule Take 1 capsule (100 mg total) by mouth 3 (three) times daily as needed for Cough. 90 capsule 1    [DISCONTINUED] benzonatate (TESSALON) 100 MG capsule Take 1 capsule (100 mg total) by mouth 3 (three) times daily as needed for Cough. 90 capsule 1    [DISCONTINUED] HYDROcodone-acetaminophen (NORCO) 5-325 mg per tablet Take 1 tablet by mouth every 6 (six) hours as needed. (Patient not taking: Reported on 11/7/2024)      [DISCONTINUED] ketorolac 0.5% (ACULAR) 0.5 % Drop Apply 1 drop to eye. (Patient not taking: Reported on 11/7/2024)      [DISCONTINUED] predniSONE (DELTASONE) 50 MG Tab Take 1 tablet (50 mg total) by mouth once daily. 7 tablet 0    [DISCONTINUED]  "promethazine (PHENERGAN) 25 MG tablet TAKE 1 TABLET BY MOUTH EVERY 6 HOURS AS NEEDED FOR NAUSEA (Patient not taking: Reported on 11/7/2024) 15 tablet 0    [DISCONTINUED] QUEtiapine (SEROQUEL) 25 MG Tab Take 1 tablet (25 mg total) by mouth every evening. 30 tablet 0     Facility-Administered Encounter Medications as of 11/18/2024   Medication Dose Route Frequency Provider Last Rate Last Admin    [COMPLETED] pneumoc 20-cooper conj-dip cr(PF) (PREVNAR-20 (PF)) injection Syrg 0.5 mL  0.5 mL Intramuscular 1 time in Clinic/HOD Xiomara Samara M, FNP   0.5 mL at 11/18/24 1446       Allergies:  Review of patient's allergies indicates:   Allergen Reactions    Cyclobenzaprine Anxiety, Itching, Palpitations and Shortness Of Breath    Sulfa (sulfonamide antibiotics)        Health Maintenance:  Immunization History   Administered Date(s) Administered    COVID-19, MRNA, LN-S, PF (MODERNA FULL 0.5 ML DOSE) 03/12/2021, 04/09/2021    Pneumococcal Conjugate - 20 Valent 11/18/2024    Tdap 06/17/2019      Health Maintenance   Topic Date Due    LDCT Lung Screen  Never done    Shingles Vaccine (1 of 2) Never done    Mammogram  05/07/2025    Colorectal Cancer Screening  11/27/2027    Lipid Panel  02/22/2028    TETANUS VACCINE  06/17/2029    Hepatitis C Screening  Completed        Physical Exam      Vital Signs  Temp: 97.9 °F (36.6 °C)  Temp Source: Oral  Pulse: 92  Resp: 18  SpO2: 95 %  BP: 112/74  Pain Score:   6  Pain Loc: Generalized  Height and Weight  Height: 5' 5" (165.1 cm)  Weight: 38.7 kg (85 lb 6.4 oz)  BSA (Calculated - sq m): 1.33 sq meters  BMI (Calculated): 14.2  Weight in (lb) to have BMI = 25: 149.9]    Physical Exam  Vitals and nursing note reviewed.   Constitutional:       General: She is not in acute distress.     Appearance: She is well-developed and underweight.   HENT:      Head: Normocephalic.      Right Ear: Hearing normal.      Left Ear: Hearing normal.      Nose: Nose normal.   Eyes:      General: Lids are normal. "      Conjunctiva/sclera: Conjunctivae normal.   Cardiovascular:      Rate and Rhythm: Normal rate and regular rhythm.      Heart sounds: Normal heart sounds.   Pulmonary:      Effort: Pulmonary effort is normal.      Breath sounds: Normal breath sounds.   Musculoskeletal:         General: Normal range of motion.      Cervical back: Normal range of motion and neck supple.      Right lower leg: No edema.      Left lower leg: No edema.   Skin:     General: Skin is warm and dry.   Neurological:      Mental Status: She is alert and oriented to person, place, and time.      Gait: Gait is intact.   Psychiatric:         Behavior: Behavior is cooperative.          Laboratory:  CBC:  Recent Labs   Lab 12/18/23  0359 12/19/23  0258 12/20/23  0413   WBC 9.48 10.87 10.95   RBC 4.28 4.33 4.30   Hemoglobin 13.4 13.4 13.3   Hematocrit 40.1 39.9 38.6   Platelet Count 213 256 294   MCV 93.7 92.1 89.8   MCH 31.3 H 30.9 30.9   MCHC 33.4 33.6 34.5     CMP:  Recent Labs   Lab 02/22/23  1730 07/05/23  1626 12/12/23  1330 12/14/23  0315 12/20/23  0523 06/27/24  0526   Glucose 80 81 102   < > 62 L  --    Calcium 9.1 9.5 8.9   < > 7.9 L 8.6 L   Albumin 4.3 4.3 3.7  --   --   --    Total Protein 7.0 7.5 7.3  --   --   --    Sodium 139 138 135 L   < > 136 138   Potassium 3.9 4.8 3.5   < > 3.7 4.5   CO2 29 31 23   < > 24  --    Carbon Dioxide  --   --   --   --   --  26   Chloride 108 H 104 103   < > 103 106   BUN 10 10 26 H   < > 9  --    Blood Urea Nitrogen  --   --   --   --   --  8   Alk Phos 68 65 66  --   --   --    ALT 30 29 23  --   --   --    AST 20 24 21  --   --   --    Bilirubin, Total 0.5 0.3 0.6  --   --   --     < > = values in this interval not displayed.     LIPIDS:  Recent Labs   Lab 03/29/22  0919 02/22/23  1730   TSH  --  0.551   HDL Cholesterol 71 H 81 H   Cholesterol 174 189   Triglycerides 60 63   LDL Calculated 91 95   Cholesterol/HDL Ratio (Risk Factor) 2.5 2.3   Non- 108     TSH:  Recent Labs   Lab  02/22/23  1730   TSH 0.551     A1C:  Recent Labs   Lab 02/22/23  1730 07/05/23  1626   Hemoglobin A1C 5.6 5.5       Assessment/Plan     Neyda Claire is a 64 y.o.female with:     1. Chronic obstructive pulmonary disease, unspecified COPD type  -     NEBULIZER FOR HOME USE    2. Need for pneumococcal 20-valent conjugate vaccination  -     pneumoc 20-cooper conj-dip cr(PF) (PREVNAR-20 (PF)) injection Syrg 0.5 mL    3. Screening for HIV (human immunodeficiency virus)  -     HIV 1/2 Ag/Ab (4th Gen); Future; Expected date: 11/18/2024    4. Need for hepatitis C screening test  -     Hepatitis C Antibody; Future; Expected date: 11/18/2024    5. Chronic cough  -     benzonatate (TESSALON) 100 MG capsule; Take 1 capsule (100 mg total) by mouth 3 (three) times daily as needed for Cough.  Dispense: 90 capsule; Refill: 1       Keep appts with specialists  Discussed ways to add calories and protein into diet to help with weight gain      Total time spent face-to-face and non-face-to-face coordinating care for this encounter was: 20 minutes     Chronic conditions status updated as per HPI.  Other than changes above, cont current medications and maintain follow up with specialists.  Return to clinic in 3 month(s).    Samara Solano, SHEILA  [facility]

## 2024-11-27 ENCOUNTER — ANESTHESIA (OUTPATIENT)
Dept: GASTROENTEROLOGY | Facility: HOSPITAL | Age: 64
End: 2024-11-27
Payer: COMMERCIAL

## 2024-11-27 ENCOUNTER — ANESTHESIA EVENT (OUTPATIENT)
Dept: GASTROENTEROLOGY | Facility: HOSPITAL | Age: 64
End: 2024-11-27
Payer: COMMERCIAL

## 2024-11-27 ENCOUNTER — HOSPITAL ENCOUNTER (OUTPATIENT)
Dept: GASTROENTEROLOGY | Facility: HOSPITAL | Age: 64
Discharge: HOME | End: 2024-11-27
Attending: INTERNAL MEDICINE | Admitting: INTERNAL MEDICINE
Payer: COMMERCIAL

## 2024-11-27 VITALS
BODY MASS INDEX: 13.66 KG/M2 | DIASTOLIC BLOOD PRESSURE: 71 MMHG | OXYGEN SATURATION: 95 % | HEART RATE: 76 BPM | WEIGHT: 82 LBS | TEMPERATURE: 98 F | HEIGHT: 65 IN | SYSTOLIC BLOOD PRESSURE: 120 MMHG | RESPIRATION RATE: 12 BRPM

## 2024-11-27 DIAGNOSIS — D12.3 ADENOMATOUS POLYP OF TRANSVERSE COLON: ICD-10-CM

## 2024-11-27 DIAGNOSIS — R13.10 DYSPHAGIA, UNSPECIFIED TYPE: ICD-10-CM

## 2024-11-27 DIAGNOSIS — R63.4 UNINTENTIONAL WEIGHT LOSS: ICD-10-CM

## 2024-11-27 PROCEDURE — 63600175 PHARM REV CODE 636 W HCPCS

## 2024-11-27 PROCEDURE — 27201423 OPTIME MED/SURG SUP & DEVICES STERILE SUPPLY

## 2024-11-27 PROCEDURE — 37000009 HC ANESTHESIA EA ADD 15 MINS

## 2024-11-27 PROCEDURE — 37000008 HC ANESTHESIA 1ST 15 MINUTES

## 2024-11-27 PROCEDURE — 88305 TISSUE EXAM BY PATHOLOGIST: CPT | Mod: TC,SUR | Performed by: INTERNAL MEDICINE

## 2024-11-27 RX ORDER — PROPOFOL 10 MG/ML
VIAL (ML) INTRAVENOUS
Status: DISCONTINUED | OUTPATIENT
Start: 2024-11-27 | End: 2024-11-27

## 2024-11-27 RX ORDER — SODIUM CHLORIDE 0.9 % (FLUSH) 0.9 %
10 SYRINGE (ML) INJECTION
Status: DISCONTINUED | OUTPATIENT
Start: 2024-11-27 | End: 2024-11-28 | Stop reason: HOSPADM

## 2024-11-27 RX ORDER — PHENYLEPHRINE HYDROCHLORIDE 10 MG/ML
INJECTION INTRAVENOUS
Status: DISCONTINUED | OUTPATIENT
Start: 2024-11-27 | End: 2024-11-27

## 2024-11-27 RX ORDER — LIDOCAINE HYDROCHLORIDE 20 MG/ML
INJECTION, SOLUTION EPIDURAL; INFILTRATION; INTRACAUDAL; PERINEURAL
Status: DISCONTINUED | OUTPATIENT
Start: 2024-11-27 | End: 2024-11-27

## 2024-11-27 RX ADMIN — PROPOFOL 40 MG: 10 INJECTION, EMULSION INTRAVENOUS at 11:11

## 2024-11-27 RX ADMIN — PROPOFOL 40 MG: 10 INJECTION, EMULSION INTRAVENOUS at 10:11

## 2024-11-27 RX ADMIN — PROPOFOL 60 MG: 10 INJECTION, EMULSION INTRAVENOUS at 10:11

## 2024-11-27 RX ADMIN — PHENYLEPHRINE HYDROCHLORIDE 100 MCG: 10 INJECTION INTRAVENOUS at 11:11

## 2024-11-27 RX ADMIN — LIDOCAINE HYDROCHLORIDE 40 MG: 20 INJECTION, SOLUTION INTRAVENOUS at 10:11

## 2024-11-27 NOTE — TRANSFER OF CARE
"Anesthesia Transfer of Care Note    Patient: Neyda Claire    Procedure(s) Performed: EGD Colonoscopy    Patient location: GI    Anesthesia Type: general and MAC    Transport from OR: Transported from OR on room air with adequate spontaneous ventilation    Post pain: adequate analgesia    Post assessment: no apparent anesthetic complications    Post vital signs: stable    Level of consciousness: awake, alert and oriented    Nausea/Vomiting: no nausea/vomiting    Complications: none    Transfer of care protocol was followedComments: Refer to nursing note for pain marlen score upon discharge from recovery      Last vitals: Visit Vitals  BP (!) 91/55   Pulse 81   Temp 36.6 °C (97.9 °F)   Resp 20   Ht 5' 5" (1.651 m)   Wt 37.2 kg (82 lb)   SpO2 (!) 93%   Breastfeeding No   BMI 13.65 kg/m²     "

## 2024-11-27 NOTE — DISCHARGE INSTRUCTIONS
Procedure Date  11/27/24     Impression  Overall Impression:   Small type I hiatal hernia  Mild erythematous mucosa in the body of the stomach and antrum, consistent with gastritis; performed cold forceps biopsies to rule out H. pylori  The upper third of the esophagus, middle third of the esophagus, lower third of the esophagus, Z-line, cardia, fundus of the stomach, incisura, duodenal bulb, 1st part of the duodenum and 2nd part of the duodenum appeared normal. Performed random biopsy to rule out eosinophilic esophagitis.  Dilated in the esophagus with Savsarah beth-Nallely dilator to 48 Fr ending size. Dilation caused improved passage of the scope        Recommendation  Await pathology results  Continue current medications  Chart reviewed, CT in August with no concerning abdominal pathology; prior cholecystectomy   If nausea persists, could consider gastric emptying study at follow up  Proceed with colonoscopy        Outcome of procedure: successful EGD  Disposition: proceed with colonoscopy   Provisions for follow up: please call my office for any unexpected symptoms like chest or abdominal pain or bleeding following your procedure.  Final Diagnosis: gastritis     Procedure Date  11/27/24     Impression  Overall Impression:   One serrated polyp measuring 10-19 mm in the sigmoid colon; performed hot snare removal  Tattoo in the transverse colon with adjacent scar from prior piecemeal EMR with no residual or recurrent adenomatous tissue  Scattered diverticulosis of moderate severity in the descending colon and sigmoid colon  (grade 2) hemorrhoids        Recommendation  Await pathology results   Repeat colonoscopy in 3 years         Outcome of procedure: successful Colonoscopy  Disposition: patient to recovery following procedure; discharge to home when appropriate parameters met  Provisions for follow up: please call my office for any unexpected symptoms like chest or abdominal pain or bleeding following your  procedure.  Final Diagnosis: colon polyp     No driving today, no operating heavy machinery, no signing any legal documents until tomorrow.    Drink lots of fluids, resume regular diet.  Take your normal medications.     Please call our office for any nausea, vomiting or abdominal pain.

## 2024-11-27 NOTE — ANESTHESIA PREPROCEDURE EVALUATION
11/27/2024  Neyda Claire is a 64 y.o., female.      Pre-op Assessment    I have reviewed the Patient Summary Reports.     I have reviewed the Nursing Notes. I have reviewed the NPO Status.   I have reviewed the Medications.     Review of Systems  Anesthesia Hx:  No problems with previous Anesthesia                Social:  Former Smoker, No Alcohol Use       Pulmonary:   COPD                     Psych:  Psychiatric History  depression bipolar               Physical Exam  General: Well nourished, Cooperative, Alert and Oriented    Airway:  Mallampati: II   Mouth Opening: Normal  TM Distance: Normal  Tongue: Normal  Neck ROM: Normal ROM    Dental:  Intact    Chest/Lungs:  Clear to auscultation, Normal Respiratory Rate    Heart:  Rate: Normal  Rhythm: Regular Rhythm  Sounds: Normal    Abdomen:  Normal, Soft, Nontender        Anesthesia Plan  Type of Anesthesia, risks & benefits discussed:    Anesthesia Type: Gen Natural Airway, MAC  Intra-op Monitoring Plan: Standard ASA Monitors  Post Op Pain Control Plan: multimodal analgesia and IV/PO Opioids PRN  Induction:  IV  Informed Consent: Informed consent signed with the Patient and all parties understand the risks and agree with anesthesia plan.  All questions answered.   ASA Score: 3  Day of Surgery Review of History & Physical: I have interviewed and examined the patient. I have reviewed the patient's H&P dated:     Ready For Surgery From Anesthesia Perspective.     .

## 2024-11-27 NOTE — H&P
Gastroenterology Pre-procedure H&P    History of Present Illness    Neyda Claire is a 64 y.o. female that  has a past medical history of Bipolar disorder, unspecified, Cancer, COPD (chronic obstructive pulmonary disease), and Mental disorder.     Patient with piecemeal resection of a large polyp in TC on prior colonoscopy here for routine surveillance of polypectomy site. She has developed dysphagia and wt loss in the interim, so we are doing an upper endoscopy today as well.       Past Medical History:   Diagnosis Date    Bipolar disorder, unspecified     Cancer     basal cell carcinoma top of head, cervical ca at age 16    COPD (chronic obstructive pulmonary disease)     Mental disorder        Past Surgical History:   Procedure Laterality Date    APPENDECTOMY  12/13/2023    Procedure: APPENDECTOMY;  Surgeon: Hebert Busby DO;  Location: [facility] OR;  Service: General;;    CHOLANGIOGRAM N/A 12/13/2023    Procedure: CHOLANGIOGRAM;  Surgeon: Hebert Busby DO;  Location: [facility] OR;  Service: General;  Laterality: N/A;    CHOLECYSTECTOMY  12/13/2023    Procedure: CHOLECYSTECTOMY;  Surgeon: Hebert Busby DO;  Location: [facility] OR;  Service: General;;    EXCISION, SMALL INTESTINE N/A 12/13/2023    Procedure: SMALL BOWEL RESECTION;  Surgeon: Hebert Busby DO;  Location: [facility] OR;  Service: General;  Laterality: N/A;    HYSTERECTOMY      LAPAROSCOPY  12/13/2023    Procedure: LAPAROSCOPY;  Surgeon: Hebert Busby DO;  Location: [facility] OR;  Service: General;;    LYSIS OF ADHESIONS  12/13/2023    Procedure: LYSIS, ADHESIONS;  Surgeon: Hebert Busby DO;  Location: [facility] OR;  Service: General;;    OOPHORECTOMY      REPAIR, HERNIA, INGUINAL  12/13/2023    Procedure: REPAIR, HERNIA, INGUINAL;  Surgeon: Hebert Busby DO;  Location: [facility] OR;  Service: General;;    TUBAL LIGATION         Family History   Problem Relation Name Age of Onset    Breast cancer Mother      Stroke Mother       Hypertension Mother      No Known Problems Father      Tuberculosis Paternal Grandfather      Liver cancer Brother         Social History     Socioeconomic History    Marital status: Single   Tobacco Use    Smoking status: Former     Current packs/day: 0.00     Average packs/day: 1 pack/day for 44.6 years (44.6 ttl pk-yrs)     Types: Cigarettes     Start date:      Quit date: 2019     Years since quittin.3     Passive exposure: Current    Smokeless tobacco: Never   Substance and Sexual Activity    Alcohol use: Not Currently    Drug use: Never    Sexual activity: Not Currently     Birth control/protection: See Surgical Hx     Social Drivers of Health     Financial Resource Strain: Low Risk  (10/14/2024)    Overall Financial Resource Strain (CARDIA)     Difficulty of Paying Living Expenses: Not hard at all   Food Insecurity: No Food Insecurity (10/14/2024)    Hunger Vital Sign     Worried About Running Out of Food in the Last Year: Never true     Ran Out of Food in the Last Year: Never true   Transportation Needs: Unknown (2024)    Received from [facility]    PRAPARE - Transportation     Lack of Transportation (Medical): No     Lack of Transportation (Non-Medical): Patient declined   Physical Activity: Sufficiently Active (10/14/2024)    Exercise Vital Sign     Days of Exercise per Week: 5 days     Minutes of Exercise per Session: 150+ min   Stress: Stress Concern Present (10/14/2024)    Citizen of [place] [employer] [place] [employer] - Occupational Stress Questionnaire     Feeling of Stress : Very much   Housing Stability: Unknown (10/14/2024)    Housing Stability Vital Sign     Unable to Pay for Housing in the Last Year: No       Current Outpatient Medications   Medication Sig Dispense Refill    albuterol (VENTOLIN HFA) 90 mcg/actuation inhaler Inhale 2 puffs into the lungs every 4 (four) hours as needed for Wheezing or Shortness  "of Breath. Rescue 18 g 11    benzonatate (TESSALON) 100 MG capsule Take 1 capsule (100 mg total) by mouth 3 (three) times daily as needed for Cough. 90 capsule 1    clonazePAM (KLONOPIN) 1 MG tablet Take 1 mg by mouth 2 (two) times daily.      ED A-HIST 4-10 mg per tablet Take 1 tablet by mouth 2 (two) times daily.      fluticasone-umeclidin-vilanter (TRELEGY ELLIPTA) 200-62.5-25 mcg inhaler Inhale 1 puff into the lungs once daily. 60 each 11    meloxicam (MOBIC) 7.5 MG tablet Take 1 tablet (7.5 mg total) by mouth once daily. 30 tablet 2    montelukast (SINGULAIR) 10 mg tablet Take 1 tablet (10 mg total) by mouth every evening. 90 tablet 1    pantoprazole (PROTONIX) 40 MG tablet Take 40 mg by mouth once daily.      QUEtiapine (SEROQUEL) 25 MG Tab Take 1 tablet by mouth in the evening 30 tablet 0    tiZANidine (ZANAFLEX) 4 MG tablet Take 4 mg by mouth every 8 (eight) hours.      traZODone (DESYREL) 100 MG tablet Take 100 mg by mouth every evening.       No current facility-administered medications for this encounter.       Review of patient's allergies indicates:   Allergen Reactions    Cyclobenzaprine Anxiety, Itching, Palpitations and Shortness Of Breath    Sulfa (sulfonamide antibiotics)        Objective:  Vitals:    11/27/24 1002   Weight: 37.2 kg (82 lb)   Height: 5' 5" (1.651 m)        GEN: normal appearing, NAD, AAO x3  HENT: NCAT, anicteric, OP benign  CV: normal rate, regular rhythm  RESP: NABS, symmetric rise, unlabored  ABD: soft, ND, no guarding or TTP  SKIN: warm and dry  NEURO: grossly afocal    Assessment and Plan:    Proceed with:    EGD & Colonoscopy for surveillance of EMR site, dysphagia, wt loss    Reji Delgado MD  Gastroenterology    "

## 2024-11-27 NOTE — ANESTHESIA POSTPROCEDURE EVALUATION
Anesthesia Post Evaluation    Patient: Neyda Claire    Procedure(s) Performed: EGD Colonoscopy    Final Anesthesia Type: MAC      Patient location during evaluation: GI PACU  Patient participation: Yes- Able to Participate  Level of consciousness: awake and alert  Post-procedure vital signs: reviewed and stable  Pain management: adequate  Airway patency: patent    PONV status at discharge: No PONV  Anesthetic complications: no      Cardiovascular status: blood pressure returned to baseline  Respiratory status: unassisted and spontaneous ventilation  Hydration status: euvolemic  Follow-up not needed.  Comments: Refer to nursing note for pain and aidan score upon discharge from recovery              Vitals Value Taken Time   /71 11/27/24 1154   Temp 36.6 °C (97.9 °F) 11/27/24 1115   Pulse 77 11/27/24 1157   Resp 10 11/27/24 1157   SpO2 95 % 11/27/24 1157   Vitals shown include unfiled device data.      No case tracking events are documented in the log.      Pain/Aidan Score: No data recorded         PRESSURE

## 2024-12-05 ENCOUNTER — CLINICAL SUPPORT (OUTPATIENT)
Dept: PULMONOLOGY | Facility: HOSPITAL | Age: 64
End: 2024-12-05
Attending: NURSE PRACTITIONER
Payer: COMMERCIAL

## 2024-12-05 ENCOUNTER — CLINICAL SUPPORT (OUTPATIENT)
Dept: PULMONOLOGY | Facility: HOSPITAL | Age: 64
End: 2024-12-05
Attending: STUDENT IN AN ORGANIZED HEALTH CARE EDUCATION/TRAINING PROGRAM
Payer: COMMERCIAL

## 2024-12-05 ENCOUNTER — HOSPITAL ENCOUNTER (EMERGENCY)
Facility: HOSPITAL | Age: 64
Discharge: HOME | End: 2024-12-05
Payer: COMMERCIAL

## 2024-12-05 VITALS — OXYGEN SATURATION: 94 %

## 2024-12-05 VITALS
HEART RATE: 81 BPM | BODY MASS INDEX: 14.33 KG/M2 | WEIGHT: 86 LBS | TEMPERATURE: 98 F | OXYGEN SATURATION: 95 % | RESPIRATION RATE: 18 BRPM | DIASTOLIC BLOOD PRESSURE: 63 MMHG | SYSTOLIC BLOOD PRESSURE: 106 MMHG | HEIGHT: 65 IN

## 2024-12-05 DIAGNOSIS — R05.3 CHRONIC COUGH: ICD-10-CM

## 2024-12-05 DIAGNOSIS — R06.02 SHORTNESS OF BREATH: ICD-10-CM
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
## 2024-12-05 DIAGNOSIS — R05.9 COUGH: ICD-10-CM

## 2024-12-05 DIAGNOSIS — I95.9 HYPOTENSION: ICD-10-CM

## 2024-12-05 LAB
ALBUMIN SERPL BCP-MCNC: 3.7 G/DL (ref 3.4–4.8)
ALBUMIN/GLOB SERPL: 1.3 {RATIO}
ALP SERPL-CCNC: 79 U/L (ref 40–150)
ALT SERPL W P-5'-P-CCNC: 26 U/L
ANION GAP SERPL CALCULATED.3IONS-SCNC: 11 MMOL/L (ref 7–16)
AST SERPL W P-5'-P-CCNC: 35 U/L (ref 5–34)
BASOPHILS # BLD AUTO: 0.05 K/UL (ref 0–0.2)
BASOPHILS NFR BLD AUTO: 0.4 % (ref 0–1)
BILIRUB SERPL-MCNC: 0.3 MG/DL
BILIRUB UR QL STRIP: NEGATIVE
BUN SERPL-MCNC: 16 MG/DL (ref 10–20)
BUN/CREAT SERPL: 22 (ref 6–20)
CALCIUM SERPL-MCNC: 8.7 MG/DL (ref 8.4–10.2)
CHLORIDE SERPL-SCNC: 106 MMOL/L (ref 98–107)
CLARITY UR: CLEAR
CO2 SERPL-SCNC: 26 MMOL/L (ref 23–31)
COLOR UR: ABNORMAL
CREAT SERPL-MCNC: 0.73 MG/DL (ref 0.55–1.02)
DIFFERENTIAL METHOD BLD: ABNORMAL
EGFR (NO RACE VARIABLE) (RUSH/TITUS): 92 ML/MIN/1.73M2
EOSINOPHIL # BLD AUTO: 0.05 K/UL (ref 0–0.5)
EOSINOPHIL NFR BLD AUTO: 0.4 % (ref 1–4)
ERYTHROCYTE [DISTWIDTH] IN BLOOD BY AUTOMATED COUNT: 13.6 % (ref 11.5–14.5)
GLOBULIN SER-MCNC: 2.9 G/DL (ref 2–4)
GLUCOSE SERPL-MCNC: 175 MG/DL (ref 82–115)
GLUCOSE UR STRIP-MCNC: NORMAL MG/DL
HCT VFR BLD AUTO: 39 % (ref 38–47)
HGB BLD-MCNC: 12.1 G/DL (ref 12–16)
IMM GRANULOCYTES # BLD AUTO: 0.03 K/UL (ref 0–0.04)
IMM GRANULOCYTES NFR BLD: 0.2 % (ref 0–0.4)
KETONES UR STRIP-SCNC: NEGATIVE MG/DL
LEUKOCYTE ESTERASE UR QL STRIP: NEGATIVE
LYMPHOCYTES # BLD AUTO: 3.89 K/UL (ref 1–4.8)
LYMPHOCYTES NFR BLD AUTO: 32.1 % (ref 27–41)
MCH RBC QN AUTO: 30.5 PG (ref 27–31)
MCHC RBC AUTO-ENTMCNC: 31 G/DL (ref 32–36)
MCV RBC AUTO: 98.2 FL (ref 80–96)
MONOCYTES # BLD AUTO: 0.68 K/UL (ref 0–0.8)
MONOCYTES NFR BLD AUTO: 5.6 % (ref 2–6)
MPC BLD CALC-MCNC: 9 FL (ref 9.4–12.4)
NEUTROPHILS # BLD AUTO: 7.41 K/UL (ref 1.8–7.7)
NEUTROPHILS NFR BLD AUTO: 61.3 % (ref 53–65)
NITRITE UR QL STRIP: NEGATIVE
NRBC # BLD AUTO: 0 X10E3/UL
NRBC, AUTO (.00): 0 %
PH UR STRIP: 6 PH UNITS
PLATELET # BLD AUTO: 408 K/UL (ref 150–400)
POTASSIUM SERPL-SCNC: 3.7 MMOL/L (ref 3.5–5.1)
PROT SERPL-MCNC: 6.6 G/DL (ref 5.8–7.6)
PROT UR QL STRIP: NEGATIVE
RBC # BLD AUTO: 3.97 M/UL (ref 4.2–5.4)
RBC # UR STRIP: NEGATIVE /UL
SODIUM SERPL-SCNC: 139 MMOL/L (ref 136–145)
SP GR UR STRIP: 1.02
UROBILINOGEN UR STRIP-ACNC: 2 MG/DL
WBC # BLD AUTO: 12.11 K/UL (ref 4.5–11)

## 2024-12-05 PROCEDURE — 25000003 PHARM REV CODE 250

## 2024-12-05 PROCEDURE — 99285 EMERGENCY DEPT VISIT HI MDM: CPT | Mod: 25

## 2024-12-05 PROCEDURE — 81003 URINALYSIS AUTO W/O SCOPE: CPT

## 2024-12-05 PROCEDURE — 96360 HYDRATION IV INFUSION INIT: CPT

## 2024-12-05 PROCEDURE — 94727 GAS DIL/WSHOT DETER LNG VOL: CPT

## 2024-12-05 PROCEDURE — 93005 ELECTROCARDIOGRAM TRACING: CPT

## 2024-12-05 PROCEDURE — 94726 PLETHYSMOGRAPHY LUNG VOLUMES: CPT

## 2024-12-05 PROCEDURE — 96372 THER/PROPH/DIAG INJ SC/IM: CPT

## 2024-12-05 PROCEDURE — 94729 DIFFUSING CAPACITY: CPT

## 2024-12-05 PROCEDURE — 36415 COLL VENOUS BLD VENIPUNCTURE: CPT

## 2024-12-05 PROCEDURE — 63600175 PHARM REV CODE 636 W HCPCS

## 2024-12-05 PROCEDURE — 94060 EVALUATION OF WHEEZING: CPT

## 2024-12-05 PROCEDURE — 93010 ELECTROCARDIOGRAM REPORT: CPT | Mod: ,,, | Performed by: INTERNAL MEDICINE

## 2024-12-05 PROCEDURE — 27100098 HC SPACER

## 2024-12-05 PROCEDURE — 85025 COMPLETE CBC W/AUTO DIFF WBC: CPT

## 2024-12-05 PROCEDURE — 80053 COMPREHEN METABOLIC PANEL: CPT

## 2024-12-05 RX ORDER — KETOROLAC TROMETHAMINE 30 MG/ML
30 INJECTION, SOLUTION INTRAMUSCULAR; INTRAVENOUS
Status: COMPLETED | OUTPATIENT
Start: 2024-12-05 | End: 2024-12-05

## 2024-12-05 RX ORDER — METHYLPREDNISOLONE SOD SUCC 125 MG
125 VIAL (EA) INJECTION
Status: COMPLETED | OUTPATIENT
Start: 2024-12-05 | End: 2024-12-05

## 2024-12-05 RX ADMIN — KETOROLAC TROMETHAMINE 30 MG: 30 INJECTION, SOLUTION INTRAMUSCULAR at 04:12

## 2024-12-05 RX ADMIN — METHYLPREDNISOLONE SODIUM SUCCINATE 125 MG: 125 INJECTION, POWDER, FOR SOLUTION INTRAMUSCULAR; INTRAVENOUS at 04:12

## 2024-12-05 RX ADMIN — SODIUM CHLORIDE 1000 ML: 9 INJECTION, SOLUTION INTRAVENOUS at 06:12

## 2024-12-06 ENCOUNTER — TELEPHONE (OUTPATIENT)
Dept: EMERGENCY MEDICINE | Facility: HOSPITAL | Age: 64
End: 2024-12-06
Payer: COMMERCIAL

## 2024-12-06 LAB
OHS QRS DURATION: 82 MS
OHS QTC CALCULATION: 440 MS

## 2024-12-06 NOTE — DISCHARGE INSTRUCTIONS
Follow up with primary care provider in the morning for re-evaluation. Drink plenty of fluids. Return to the emergency department for new or worsening symptoms.

## 2024-12-06 NOTE — ED PROVIDER NOTES
Encounter Date: 12/5/2024       History     Chief Complaint   Patient presents with    Hypotension     Pt sent from Dr Anders office for low BP. States BP in office was 90 systolic and Dr Anders did not want to finish the lung tests.     64-year old female presents to the emergency department for evaluation of hypotension. States that she was upstairs at an pulmonology appointment with Dr. Anders for a procedure when she was sent to ED due to low blood pressure. Patient reports a history of low blood pressure and states that she has been chronically low. Further reports that she had a fall one week ago and continues to have pain to right wrist and ribs on right side. Further states that she slipped on a mat a home and fell on to right side, hitting right side ribs on side of counter. Denies head injury, loss of consciousness, blurred vision, nausea, vomiting, neck pain/stiffness, back pain, chest pain, shortness of breath, abdominal pain, dysuria.    The history is provided by the patient. No  was used.     Review of patient's allergies indicates:   Allergen Reactions    Cyclobenzaprine Anxiety, Itching, Palpitations and Shortness Of Breath    Sulfa (sulfonamide antibiotics)      Past Medical History:   Diagnosis Date    Bipolar disorder, unspecified     Cancer     basal cell carcinoma top of head, cervical ca at age 16    COPD (chronic obstructive pulmonary disease)     Mental disorder      Past Surgical History:   Procedure Laterality Date    APPENDECTOMY  12/13/2023    Procedure: APPENDECTOMY;  Surgeon: Hebert Busby DO;  Location: [facility] OR;  Service: General;;    CHOLANGIOGRAM N/A 12/13/2023    Procedure: CHOLANGIOGRAM;  Surgeon: Hebert Busby DO;  Location: [facility] OR;  Service: General;  Laterality: N/A;    CHOLECYSTECTOMY  12/13/2023    Procedure: CHOLECYSTECTOMY;  Surgeon: Hebert Busby DO;  Location: [facility] OR;  Service: General;;    EXCISION, SMALL INTESTINE N/A  2023    Procedure: SMALL BOWEL RESECTION;  Surgeon: Hebert Busby DO;  Location: [facility] OR;  Service: General;  Laterality: N/A;    HYSTERECTOMY      LAPAROSCOPY  2023    Procedure: LAPAROSCOPY;  Surgeon: Hebert Busby DO;  Location: [facility] OR;  Service: General;;    LYSIS OF ADHESIONS  2023    Procedure: LYSIS, ADHESIONS;  Surgeon: Hebert Busby DO;  Location: [facility] OR;  Service: General;;    OOPHORECTOMY      REPAIR, HERNIA, INGUINAL  2023    Procedure: REPAIR, HERNIA, INGUINAL;  Surgeon: Hebert Busby DO;  Location: [facility] OR;  Service: General;;    TUBAL LIGATION       Family History   Problem Relation Name Age of Onset    Breast cancer Mother      Stroke Mother      Hypertension Mother      No Known Problems Father      Tuberculosis Paternal Grandfather      Liver cancer Brother       Social History     Tobacco Use    Smoking status: Former     Current packs/day: 0.00     Average packs/day: 1 pack/day for 44.6 years (44.6 ttl pk-yrs)     Types: Cigarettes     Start date:      Quit date: 2019     Years since quittin.3     Passive exposure: Current    Smokeless tobacco: Never   Substance Use Topics    Alcohol use: Not Currently    Drug use: Never     Review of Systems   Constitutional:  Negative for chills and fever.        Reports low blood pressure   HENT:  Negative for congestion and rhinorrhea.    Eyes:  Negative for photophobia and pain.   Respiratory:  Negative for chest tightness and shortness of breath.    Cardiovascular:  Negative for chest pain and palpitations.   Gastrointestinal:  Negative for abdominal pain, diarrhea, nausea and vomiting.   Genitourinary:  Negative for decreased urine volume, difficulty urinating and dysuria.   Musculoskeletal:  Negative for back pain, neck pain and neck stiffness.   Neurological:  Positive for light-headedness. Negative for dizziness, tremors, weakness and headaches.   Psychiatric/Behavioral:  Negative  for confusion.    All other systems reviewed and are negative.      Physical Exam     Initial Vitals   BP Pulse Resp Temp SpO2   12/05/24 1547 12/05/24 1547 12/05/24 1549 12/05/24 1547 12/05/24 1547   108/72 82 20 97.9 °F (36.6 °C) (!) 94 %      MAP       --                Physical Exam    Vitals reviewed.  Constitutional: She appears well-nourished. No distress.   HENT:   Head: Normocephalic.   Eyes: Conjunctivae are normal. Right eye exhibits no discharge. Left eye exhibits no discharge.   Neck: Neck supple.   Normal range of motion.  Cardiovascular:  Normal rate, regular rhythm and normal heart sounds.           Pulmonary/Chest: Breath sounds normal. No respiratory distress. She has no wheezes. She has no rhonchi. She exhibits no tenderness.   Abdominal: Abdomen is soft. Bowel sounds are normal. She exhibits no distension. There is no abdominal tenderness. There is no guarding.   Musculoskeletal:         General: Normal range of motion.      Cervical back: Normal range of motion and neck supple.     Lymphadenopathy:     She has no cervical adenopathy.   Neurological: She is alert and oriented to person, place, and time. She has normal strength. No sensory deficit. GCS score is 15. GCS eye subscore is 4. GCS verbal subscore is 5. GCS motor subscore is 6.   Skin: Skin is warm and dry. Capillary refill takes less than 2 seconds.   Psychiatric: She has a normal mood and affect. Her behavior is normal.         Medical Screening Exam   See Full Note    ED Course   Procedures  Labs Reviewed   COMPREHENSIVE METABOLIC PANEL - Abnormal       Result Value    Sodium 139      Potassium 3.7      Chloride 106      CO2 26      Anion Gap 11      Glucose 175 (*)     BUN 16      Creatinine 0.73      BUN/Creatinine Ratio 22 (*)     Calcium 8.7      Total Protein 6.6      Albumin 3.7      Globulin 2.9      A/G Ratio 1.3      Bilirubin, Total 0.3      Alk Phos 79      ALT 26      AST 35 (*)     eGFR 92     URINALYSIS, REFLEX TO URINE  CULTURE - Abnormal    Color, UA Light-Yellow      Clarity, UA Clear      pH, UA 6.0      Leukocytes, UA Negative      Nitrites, UA Negative      Protein, UA Negative      Glucose, UA Normal      Ketones, UA Negative      Urobilinogen, UA 2 (*)     Bilirubin, UA Negative      Blood, UA Negative      Specific Gravity, UA 1.024     CBC WITH DIFFERENTIAL - Abnormal    WBC 12.11 (*)     RBC 3.97 (*)     Hemoglobin 12.1      Hematocrit 39.0      MCV 98.2 (*)     MCH 30.5      MCHC 31.0 (*)     RDW 13.6      Platelet Count 408 (*)     MPV 9.0 (*)     Neutrophils % 61.3      Lymphocytes % 32.1      Monocytes % 5.6      Eosinophils % 0.4 (*)     Basophils % 0.4      Immature Granulocytes % 0.2      nRBC, Auto 0.0      Neutrophils, Abs 7.41      Lymphocytes, Absolute 3.89      Monocytes, Absolute 0.68      Eosinophils, Absolute 0.05      Basophils, Absolute 0.05      Immature Granulocytes, Absolute 0.03      nRBC, Absolute 0.00      Diff Type Auto     CBC W/ AUTO DIFFERENTIAL    Narrative:     The following orders were created for panel order CBC auto differential.  Procedure                               Abnormality         Status                     ---------                               -----------         ------                     CBC with Differential[[id number]]       Abnormal            Final result                 Please view results for these tests on the individual orders.          Imaging Results              X-Ray Wrist Complete Right (Final result)  Result time 12/05/24 16:46:19      Final result by Miguel Holland MD (12/05/24 16:46:19)                   Impression:      No radiographic evidence of acute displaced fracture or dislocation of the right wrist.      Electronically signed by: Miguel Holland MD  Date:    12/05/2024  Time:    16:46               Narrative:    EXAMINATION:  XR WRIST COMPLETE 3 VIEWS RIGHT    CLINICAL HISTORY:  Unspecified fall, initial encounter    TECHNIQUE:  PA, lateral, and  oblique views of the right wrist were performed.    COMPARISON:  None    FINDINGS:  There is osteopenia.  No radiographic evidence of acute displaced fracture of the right wrist.  Alignment appears within normal limits without evidence of dislocation.  There is mild degenerative osteoarthrosis.                                       X-Ray Ribs 2 View Right (Final result)  Result time 12/05/24 16:45:15      Final result by Miguel Holland MD (12/05/24 16:45:15)                   Impression:      Hyperexpanded lungs with coarse interstitial attenuation which may reflect underlying emphysema/COPD.  Clinical correlation advised.  No large confluent airspace consolidation appreciated.    No definite radiographic evidence of acute displaced right-sided rib fracture.  Further evaluation and follow-up as warranted.      Electronically signed by: Miguel Holland MD  Date:    12/05/2024  Time:    16:45               Narrative:    EXAMINATION:  XR CHEST PA AND LATERAL; XR RIBS 2 VIEW RIGHT    CLINICAL HISTORY:  Unspecified fall, initial encounter    TECHNIQUE:  PA and lateral views of the chest were performed.  Two additional dedicated views of the right ribs were performed.    COMPARISON:  Chest radiograph 12/16/2022    FINDINGS:  Cardiac silhouette is not significantly enlarged.  There is aortic atherosclerosis.  Lungs appear hyperexpanded with coarse interstitial attenuation which may reflect underlying emphysema/COPD.  No large confluent airspace consolidation appreciated.  No definite pneumothorax or significant volume of pleural fluid identified.  There are degenerative changes of the visualized thoracic spine.  There is a mild rightward curvature of the thoracic spine.    No definite acute right-sided rib fracture identified.  There is a remote appearing deformity of the right posterior 9th rib.  Right glenohumeral alignment appears within normal limits.                                       X-Ray Chest PA And  Lateral (Final result)  Result time 12/05/24 16:45:15      Final result by Miguel Holland MD (12/05/24 16:45:15)                   Impression:      Hyperexpanded lungs with coarse interstitial attenuation which may reflect underlying emphysema/COPD.  Clinical correlation advised.  No large confluent airspace consolidation appreciated.    No definite radiographic evidence of acute displaced right-sided rib fracture.  Further evaluation and follow-up as warranted.      Electronically signed by: Miguel Holland MD  Date:    12/05/2024  Time:    16:45               Narrative:    EXAMINATION:  XR CHEST PA AND LATERAL; XR RIBS 2 VIEW RIGHT    CLINICAL HISTORY:  Unspecified fall, initial encounter    TECHNIQUE:  PA and lateral views of the chest were performed.  Two additional dedicated views of the right ribs were performed.    COMPARISON:  Chest radiograph 12/16/2022    FINDINGS:  Cardiac silhouette is not significantly enlarged.  There is aortic atherosclerosis.  Lungs appear hyperexpanded with coarse interstitial attenuation which may reflect underlying emphysema/COPD.  No large confluent airspace consolidation appreciated.  No definite pneumothorax or significant volume of pleural fluid identified.  There are degenerative changes of the visualized thoracic spine.  There is a mild rightward curvature of the thoracic spine.    No definite acute right-sided rib fracture identified.  There is a remote appearing deformity of the right posterior 9th rib.  Right glenohumeral alignment appears within normal limits.                                       Medications   ketorolac injection 30 mg (30 mg Intramuscular Given 12/5/24 1647)   methylPREDNISolone sodium succinate injection 125 mg (125 mg Intramuscular Given 12/5/24 1647)   sodium chloride 0.9% bolus 1,000 mL 1,000 mL (0 mLs Intravenous Stopped 12/5/24 1933)     Medical Decision Making  64-year old female presents to the emergency department for evaluation of  hypotension. States that she was upstairs at an pulmonology appointment with Dr. Anders for a procedure when she was sent to ED due to low blood pressure. Patient reports a history of low blood pressure and states that she has been chronically low. Further reports that she had a fall one week ago and continues to have pain to right wrist and ribs on right side. Further states that she slipped on a mat a home and fell on to right side, hitting right side ribs on side of counter. Denies head injury, loss of consciousness, blurred vision, nausea, vomiting, neck pain/stiffness, back pain, chest pain, shortness of breath, abdominal pain, dysuria.  Ordered and reviewed EKG with results significant for normal sinus rhythm at 72 beats per minute. No STEMI. Interpreted by Dr. Prado.  Ordered and reviewed urinalysis  Ordered and reviewed labs  Ordered and reviewed xray right wrist as well as radiologist's interpretation with results significant for no radiographic evidence of acute displaced fracture or dislocation of the right wrist.  Ordered and reviewed xray right ribs as well as radiologist's interpretation with results significant for hyperexpanded lungs with coarse interstitial attenuation which may reflect underlying emphysema/COPD.  Clinical correlation advised.  No large confluent airspace consolidation appreciated. No definite radiographic evidence of acute displaced right-sided rib fracture.  Further evaluation and follow-up as warranted.  Ordered and reviewed chest xray as well as radiologist's interpretation with results significant for hyperexpanded lungs with coarse interstitial attenuation which may reflect underlying emphysema/COPD.  Clinical correlation advised.  No large confluent airspace consolidation appreciated. No definite radiographic evidence of acute displaced right-sided rib fracture.  Further evaluation and follow-up as warranted.  Ordered toradol, solu-medrol, 1L NS bolus  Follow up and return  precautions discussed with patient, who verbalized understanding  Diagnosis: Hypotension, fall    Amount and/or Complexity of Data Reviewed  Labs: ordered.  Radiology: ordered.    Risk  Prescription drug management.                                      Clinical Impression:   Final diagnoses:  [I95.9] Hypotension  [W19.XXXA] Fall  [R05.9] Cough        ED Disposition Condition    Discharge Stable          ED Prescriptions    None       Follow-up Information    None          Rubens Gatica NP  12/05/24 2027

## 2024-12-06 NOTE — PROCEDURES
PFT REPORT:  SPIROMETRY:  The pre-BD FVC is normal, 3.44L/0.60 Z score.  The pre-BD FEV1 is decreased, 1.54L/-2.39 Z score.  The pre-BD FEV1/FVC ratio is 45/-3.78 Z score.    The post-BD FVC is normal, 3.47L/0.66 Z score.  The post-BD FEV1 is decreased, 1.52L/-2.44 Z score.  The post-BD FEV1/FVC ratio is 44/-3.86 Z score.    There is no significant bronchodilator effect.  There is scooping out of the expiratory limb of the flow volume loop.     LUNG VOLUMES:  The TLC is increased, 7.10L/2.89 Z score.  The FRC is increased, 5.06L/3.26 Z score.  The RV is increased, 3.66L/3.25 Z score.    DIFFUSION CAPACITY:  The diffusion capacity is not corrected for hemoglobin and is decreased, 6.82/-7.36 Z score.    Interpretation:  The post-BD spirometry shows a moderate obstructive defect.  There is no significant response to bronchodilators. Lack of a bronchodilator response in the lab does not predict clinical response to bronchodilator therapy.  The flow volume loops is consistent with an obstructive ventilatory defect.   There is no restrictive defect. Hyperinflation is present. Gas trapping is present.  There is a severe diffusion defect. Decreased diffusion capacity with obstruction is suggestive of emphysema.      Marilin Anders MD  Pulmonary Medicine  [facility]

## 2024-12-12 ENCOUNTER — OFFICE VISIT (OUTPATIENT)
Dept: FAMILY MEDICINE | Facility: CLINIC | Age: 64
End: 2024-12-12
Payer: COMMERCIAL

## 2024-12-12 VITALS
HEART RATE: 96 BPM | OXYGEN SATURATION: 96 % | WEIGHT: 86.13 LBS | HEIGHT: 65 IN | RESPIRATION RATE: 20 BRPM | DIASTOLIC BLOOD PRESSURE: 67 MMHG | TEMPERATURE: 98 F | BODY MASS INDEX: 14.35 KG/M2 | SYSTOLIC BLOOD PRESSURE: 112 MMHG

## 2024-12-12 DIAGNOSIS — G47.00 INSOMNIA, UNSPECIFIED TYPE: Chronic | ICD-10-CM

## 2024-12-12 DIAGNOSIS — K21.9 GERD WITHOUT ESOPHAGITIS: ICD-10-CM

## 2024-12-12 DIAGNOSIS — J44.9 CHRONIC OBSTRUCTIVE PULMONARY DISEASE, UNSPECIFIED COPD TYPE: Primary | ICD-10-CM

## 2024-12-12 DIAGNOSIS — M54.2 NECK PAIN: ICD-10-CM

## 2024-12-12 DIAGNOSIS — F31.9 BIPOLAR DEPRESSION: ICD-10-CM

## 2024-12-12 PROCEDURE — 3008F BODY MASS INDEX DOCD: CPT | Mod: ,,, | Performed by: NURSE PRACTITIONER

## 2024-12-12 PROCEDURE — 3078F DIAST BP <80 MM HG: CPT | Mod: ,,, | Performed by: NURSE PRACTITIONER

## 2024-12-12 PROCEDURE — 3074F SYST BP LT 130 MM HG: CPT | Mod: ,,, | Performed by: NURSE PRACTITIONER

## 2024-12-12 PROCEDURE — 99214 OFFICE O/P EST MOD 30 MIN: CPT | Mod: ,,, | Performed by: NURSE PRACTITIONER

## 2024-12-12 RX ORDER — QUETIAPINE FUMARATE 25 MG/1
25 TABLET, FILM COATED ORAL NIGHTLY
Qty: 90 TABLET | Refills: 0 | Status: SHIPPED | OUTPATIENT
Start: 2024-12-12

## 2024-12-12 RX ORDER — ALBUTEROL SULFATE 0.83 MG/ML
2.5 SOLUTION RESPIRATORY (INHALATION) EVERY 6 HOURS PRN
Qty: 90 ML | Refills: 1 | Status: SHIPPED | OUTPATIENT
Start: 2024-12-12 | End: 2025-12-12

## 2024-12-12 RX ORDER — MELOXICAM 15 MG/1
15 TABLET ORAL DAILY
Qty: 30 TABLET | Refills: 1 | Status: SHIPPED | OUTPATIENT
Start: 2024-12-12

## 2024-12-12 RX ORDER — TIZANIDINE 4 MG/1
4 TABLET ORAL EVERY 6 HOURS PRN
Qty: 30 TABLET | Refills: 2 | Status: SHIPPED | OUTPATIENT
Start: 2024-12-12

## 2024-12-12 RX ORDER — TRAZODONE HYDROCHLORIDE 100 MG/1
100 TABLET ORAL NIGHTLY
Qty: 90 TABLET | Refills: 0 | Status: SHIPPED | OUTPATIENT
Start: 2024-12-12

## 2024-12-12 RX ORDER — PANTOPRAZOLE SODIUM 40 MG/1
40 TABLET, DELAYED RELEASE ORAL DAILY
Qty: 90 TABLET | Refills: 0 | Status: SHIPPED | OUTPATIENT
Start: 2024-12-12 | End: 2025-03-12

## 2024-12-12 NOTE — PROGRESS NOTES
[facility]    Chief Complaint      Chief Complaint   Patient presents with    COPD     Pt here for follow up to review labs.        History of Present Illness      Neyda Claire is a 64 y.o. female. She  has a past medical history of Bipolar disorder, unspecified, Cancer, COPD (chronic obstructive pulmonary disease), and Mental disorder., who presents today for f/u of her COPD and review lab work.  Her weight is stable.  She states she got her dental work completed and is still making sure she drinks protein shakes daily. She has another f/u appt with Dr. Anders on 24.    Past Medical History:  Past Medical History:   Diagnosis Date    Bipolar disorder, unspecified     Cancer     basal cell carcinoma top of head, cervical ca at age 16    COPD (chronic obstructive pulmonary disease)     Mental disorder        Past Surgical History:   has a past surgical history that includes Hysterectomy; Tubal ligation; Laparoscopy (2023); repair, hernia, inguinal (2023); excision, small intestine (N/A, 2023); Appendectomy (2023); Cholangiogram (N/A, 2023); Cholecystectomy (2023); Lysis of adhesions (2023); and Oophorectomy.    Social History:  Social History     Tobacco Use    Smoking status: Former     Current packs/day: 0.00     Average packs/day: 1 pack/day for 44.6 years (44.6 ttl pk-yrs)     Types: Cigarettes     Start date:      Quit date: 2019     Years since quittin.3    Smokeless tobacco: Never   Substance Use Topics    Alcohol use: Not Currently    Drug use: Never       I personally reviewed all past medical, surgical, and social.     Review of Systems   Constitutional:  Positive for activity change and unexpected weight change.   HENT:  Positive for rhinorrhea. Negative for hearing loss and trouble swallowing.    Eyes:  Positive for discharge. Negative for visual disturbance.   Respiratory:  Positive for chest tightness and wheezing.    Cardiovascular:   Positive for palpitations. Negative for chest pain.   Gastrointestinal:  Negative for blood in stool, constipation, diarrhea and vomiting.   Endocrine: Positive for polydipsia. Negative for polyuria.   Genitourinary:  Negative for difficulty urinating, dysuria, hematuria and menstrual problem.   Musculoskeletal:  Positive for arthralgias and neck pain. Negative for joint swelling.   Neurological:  Positive for weakness and headaches.   Psychiatric/Behavioral:  Positive for dysphoric mood. Negative for confusion.         Medications:  Outpatient Encounter Medications as of 12/12/2024   Medication Sig Dispense Refill    albuterol (PROVENTIL) 2.5 mg /3 mL (0.083 %) nebulizer solution Take 3 mLs (2.5 mg total) by nebulization every 6 (six) hours as needed for Wheezing. 90 mL 1    albuterol (VENTOLIN HFA) 90 mcg/actuation inhaler Inhale 2 puffs into the lungs every 4 (four) hours as needed for Wheezing or Shortness of Breath. Rescue 18 g 11    benzonatate (TESSALON) 100 MG capsule Take 1 capsule (100 mg total) by mouth 3 (three) times daily as needed for Cough. 90 capsule 1    ED A-HIST 4-10 mg per tablet Take 1 tablet by mouth 2 (two) times daily. (Patient not taking: Reported on 12/16/2024)      fluticasone-umeclidin-vilanter (TRELEGY ELLIPTA) 200-62.5-25 mcg inhaler Inhale 1 puff into the lungs once daily. 60 each 11    meloxicam (MOBIC) 15 MG tablet Take 1 tablet (15 mg total) by mouth once daily. 30 tablet 1    montelukast (SINGULAIR) 10 mg tablet Take 1 tablet (10 mg total) by mouth every evening. 90 tablet 1    pantoprazole (PROTONIX) 40 MG tablet Take 1 tablet (40 mg total) by mouth once daily. 90 tablet 0    QUEtiapine (SEROQUEL) 25 MG Tab Take 1 tablet (25 mg total) by mouth every evening. 90 tablet 0    tiZANidine (ZANAFLEX) 4 MG tablet Take 1 tablet (4 mg total) by mouth every 6 (six) hours as needed (Neck pain). 30 tablet 2    traZODone (DESYREL) 100 MG tablet Take 1 tablet (100 mg total) by mouth every  "evening. 90 tablet 0    [DISCONTINUED] clonazePAM (KLONOPIN) 1 MG tablet Take 1 mg by mouth 2 (two) times daily.      [DISCONTINUED] meloxicam (MOBIC) 7.5 MG tablet Take 1 tablet (7.5 mg total) by mouth once daily. 30 tablet 2    [DISCONTINUED] pantoprazole (PROTONIX) 40 MG tablet Take 40 mg by mouth once daily.      [DISCONTINUED] QUEtiapine (SEROQUEL) 25 MG Tab Take 1 tablet by mouth in the evening 30 tablet 0    [DISCONTINUED] traZODone (DESYREL) 100 MG tablet Take 100 mg by mouth every evening.       No facility-administered encounter medications on file as of 12/12/2024.       Allergies:  Review of patient's allergies indicates:   Allergen Reactions    Cyclobenzaprine Anxiety, Itching, Palpitations and Shortness Of Breath    Sulfa (sulfonamide antibiotics)        Health Maintenance:  Immunization History   Administered Date(s) Administered    COVID-19, MRNA, LN-S, PF (MODERNA FULL 0.5 ML DOSE) 03/12/2021, 04/09/2021    Pneumococcal Conjugate - 20 Valent 11/18/2024    Tdap 06/17/2019      Health Maintenance   Topic Date Due    LDCT Lung Screen  Never done    Shingles Vaccine (1 of 2) Never done    RSV Vaccine (Age 60+ and Pregnant patients) (1 - Risk 60-74 years 1-dose series) Never done    Influenza Vaccine (1) Never done    COVID-19 Vaccine (3 - 2024-25 season) 09/01/2024    Mammogram  05/07/2025    Colorectal Cancer Screening  11/27/2027    Lipid Panel  02/22/2028    TETANUS VACCINE  06/17/2029    Hepatitis C Screening  Completed    HIV Screening  Completed    Pneumococcal Vaccines (Age 50+)  Completed        Physical Exam      Vital Signs  Temp: 98.1 °F (36.7 °C)  Temp Source: Oral  Pulse: 96  Resp: 20  SpO2: 96 %  BP: 112/67  BP Location: Left arm  Patient Position: Sitting  Height and Weight  Height: 5' 5" (165.1 cm)  Weight: 39.1 kg (86 lb 2 oz)  BSA (Calculated - sq m): 1.34 sq meters  BMI (Calculated): 14.3  Weight in (lb) to have BMI = 25: 149.9]    Physical Exam  Vitals and nursing note reviewed. "   Constitutional:       General: She is not in acute distress.     Appearance: She is well-developed and underweight.   HENT:      Head: Normocephalic.      Right Ear: Hearing normal.      Left Ear: Hearing normal.      Nose: Nose normal.   Eyes:      General: Lids are normal.      Conjunctiva/sclera: Conjunctivae normal.   Cardiovascular:      Rate and Rhythm: Normal rate and regular rhythm.      Heart sounds: Normal heart sounds.   Pulmonary:      Effort: Pulmonary effort is normal.      Breath sounds: Normal breath sounds.   Musculoskeletal:         General: Normal range of motion.      Cervical back: Normal range of motion and neck supple.      Right lower leg: No edema.      Left lower leg: No edema.   Skin:     General: Skin is warm and dry.   Neurological:      Mental Status: She is alert and oriented to person, place, and time.      Gait: Gait is intact.   Psychiatric:         Behavior: Behavior is cooperative.          Laboratory:  CBC:  Recent Labs   Lab 12/19/23  0258 12/20/23  0413 12/05/24  1652   WBC 10.87 10.95 12.11 H   RBC 4.33 4.30 3.97 L   Hemoglobin 13.4 13.3 12.1   Hematocrit 39.9 38.6 39.0   Platelet Count 256 294 408 H   MCV 92.1 89.8 98.2 H   MCH 30.9 30.9 30.5   MCHC 33.6 34.5 31.0 L     CMP:  Recent Labs   Lab 07/05/23  1626 12/12/23  1330 12/14/23  0315 12/05/24  1652   Glucose 81 102   < > 175 H   Calcium 9.5 8.9   < > 8.7   Albumin 4.3 3.7  --  3.7   Total Protein 7.5 7.3  --  6.6   Sodium 138 135 L   < > 139   Potassium 4.8 3.5   < > 3.7   CO2 31 23   < > 26   Carbon Dioxide  --   --    < >  --    Chloride 104 103   < > 106   BUN 10 26 H   < > 16   Blood Urea Nitrogen  --   --    < >  --    Alk Phos 65 66  --  79   ALT 29 23  --  26   AST 24 21  --  35 H   Bilirubin, Total 0.3 0.6  --  0.3    < > = values in this interval not displayed.     LIPIDS:  Recent Labs   Lab 03/29/22  0919 02/22/23  1730   TSH  --  0.551   HDL Cholesterol 71 H 81 H   Cholesterol 174 189   Triglycerides 60 63    LDL Calculated 91 95   Cholesterol/HDL Ratio (Risk Factor) 2.5 2.3   Non- 108     TSH:  Recent Labs   Lab 02/22/23  1730   TSH 0.551     A1C:  Recent Labs   Lab 02/22/23  1730 07/05/23  1626   Hemoglobin A1C 5.6 5.5       Assessment/Plan     Neyda Claire is a 64 y.o.female with:     1. Chronic obstructive pulmonary disease, unspecified COPD type  -     albuterol (PROVENTIL) 2.5 mg /3 mL (0.083 %) nebulizer solution; Take 3 mLs (2.5 mg total) by nebulization every 6 (six) hours as needed for Wheezing.  Dispense: 90 mL; Refill: 1    2. Bipolar depression  -     QUEtiapine (SEROQUEL) 25 MG Tab; Take 1 tablet (25 mg total) by mouth every evening.  Dispense: 90 tablet; Refill: 0    3. Neck pain  -     tiZANidine (ZANAFLEX) 4 MG tablet; Take 1 tablet (4 mg total) by mouth every 6 (six) hours as needed (Neck pain).  Dispense: 30 tablet; Refill: 2  -     meloxicam (MOBIC) 15 MG tablet; Take 1 tablet (15 mg total) by mouth once daily.  Dispense: 30 tablet; Refill: 1    4. GERD without esophagitis  -     pantoprazole (PROTONIX) 40 MG tablet; Take 1 tablet (40 mg total) by mouth once daily.  Dispense: 90 tablet; Refill: 0    5. Insomnia, unspecified type  -     traZODone (DESYREL) 100 MG tablet; Take 1 tablet (100 mg total) by mouth every evening.  Dispense: 90 tablet; Refill: 0         Total time spent face-to-face and non-face-to-face coordinating care for this encounter was: 30 minutes    Chronic conditions status updated as per HPI.  Other than changes above, cont current medications and maintain follow up with specialists.  Return to clinic in 3 month(s).    Samara Solano, SHERIFP  [facility]

## 2024-12-13 RX ORDER — CLONAZEPAM 1 MG/1
1 TABLET ORAL
COMMUNITY
Start: 2024-11-25

## 2024-12-15 NOTE — PROGRESS NOTES
[facility]  Pulmonology  ESTABLISHED VISIT     Patient Name:  Neyda Claire  Primary Care Provider: Samara Solano FNP  Date of Service: 12/16/2024     Chief Complaint: Cough    SUBJECTIVE   HPI:  Neyda Claire is a 64 y.o. female with COPD (FEV1 1.52) and prior nicotine dependence who presents for follow up of cough. Last seen 11/2024 with treatment for acute bronchitis flare, initiation of Trelegy, plan for LDCT, PFT and 6MWT.    Dakotah reports feeling improved today.  She had low blood pressure during her lung function testing that warranted transferred to the ED.  6MWD testing was deferred for symptomatic hypotension. She has since established with a endocrinology and is planned for a stim test in January.  She is working on increased her caloric intake.  She continues on Trelegy daily and her primary care team just refilled her albuterol nebulizer which he is using more frequently for shortness of breath.  We discussed the results of her CXR and PFT.      Initial HPI  Dakotah being a progressive cough over the past few weeks that has raised concern.  She states that she was coughing so much that she has soreness in her ribs and her back.  Her cough is productive of sputum that is opaque but not black.  She has a poor appetite as well and reports some weight loss of the past year.  She reports that she was previously diagnosed with COPD after hospitalization for an acute illness which included a pneumonia with a spot in her lung.  She expresses concern with possible cancer given her smoking history as well as her family history of lung cancer.  She would like to have screening for lung cancer.  She was previously received treatment with Trelegy which improved her breathing symptoms; at this time, she was managed with albuterol which he was on an as-needed basis.  She has had no hospitalizations over the past year for any respiratory complaints.  She had abdominal surgery in 06/2024 for hernia repair  with a unremarkable postoperative course.      Past Medical History:   Diagnosis Date    Bipolar disorder, unspecified     Cancer     basal cell carcinoma top of head, cervical ca at age 16    COPD (chronic obstructive pulmonary disease)     Mental disorder        Past Surgical History:   Procedure Laterality Date    APPENDECTOMY  2023    Procedure: APPENDECTOMY;  Surgeon: Hebert Busby DO;  Location: [facility] OR;  Service: General;;    CHOLANGIOGRAM N/A 2023    Procedure: CHOLANGIOGRAM;  Surgeon: Hebert Busby DO;  Location: [facility] OR;  Service: General;  Laterality: N/A;    CHOLECYSTECTOMY  2023    Procedure: CHOLECYSTECTOMY;  Surgeon: Hebert Busby DO;  Location: [facility] OR;  Service: General;;    EXCISION, SMALL INTESTINE N/A 2023    Procedure: SMALL BOWEL RESECTION;  Surgeon: Hebert Busby DO;  Location: [facility] OR;  Service: General;  Laterality: N/A;    HYSTERECTOMY      LAPAROSCOPY  2023    Procedure: LAPAROSCOPY;  Surgeon: Hebert Busby DO;  Location: [facility] OR;  Service: General;;    LYSIS OF ADHESIONS  2023    Procedure: LYSIS, ADHESIONS;  Surgeon: Hebert Busby DO;  Location: [facility] OR;  Service: General;;    OOPHORECTOMY      REPAIR, HERNIA, INGUINAL  2023    Procedure: REPAIR, HERNIA, INGUINAL;  Surgeon: Hebert Busby DO;  Location: [facility] OR;  Service: General;;    TUBAL LIGATION         Family History   Problem Relation Name Age of Onset    Breast cancer Mother      Stroke Mother      Hypertension Mother      No Known Problems Father      Tuberculosis Paternal Grandfather      Liver cancer Brother          Social History     Socioeconomic History    Marital status: Single   Tobacco Use    Smoking status: Former     Current packs/day: 0.00     Average packs/day: 1 pack/day for 44.6 years (44.6 ttl pk-yrs)     Types: Cigarettes     Start date:      Quit date: 2019     Years since quittin.3    Smokeless  "tobacco: Never   Substance and Sexual Activity    Alcohol use: Not Currently    Drug use: Never    Sexual activity: Not Currently     Birth control/protection: See Surgical Hx     Social Drivers of Health     Financial Resource Strain: Low Risk  (10/14/2024)    Overall Financial Resource Strain (CARDIA)     Difficulty of Paying Living Expenses: Not hard at all   Food Insecurity: No Food Insecurity (10/14/2024)    Hunger Vital Sign     Worried About Running Out of Food in the Last Year: Never true     Ran Out of Food in the Last Year: Never true   Transportation Needs: Unknown (7/2/2024)    Received from [facility] and [facility] Subsidiaries and Affiliates    PRAPARE - Transportation     Lack of Transportation (Medical): No     Lack of Transportation (Non-Medical): Patient declined   Physical Activity: Sufficiently Active (10/14/2024)    Exercise Vital Sign     Days of Exercise per Week: 5 days     Minutes of Exercise per Session: 150+ min   Stress: Stress Concern Present (10/14/2024)    [employer] [place] [employer] - Occupational Stress Questionnaire     Feeling of Stress : Very much   Housing Stability: Unknown (10/14/2024)    Housing Stability Vital Sign     Unable to Pay for Housing in the Last Year: No       Social History     Social History Narrative    Not on file       Review of patient's allergies indicates:   Allergen Reactions    Cyclobenzaprine Anxiety, Itching, Palpitations and Shortness Of Breath    Sulfa (sulfonamide antibiotics)         Medications: Medications reviewed to include over the counter medications.    Review of Systems: A focused ROS was completed and found to be negative except for that mentioned above.      OBJECTIVE   PHYSICAL EXAM:  Vitals:    12/16/24 1312   BP: 102/62   BP Location: Left arm   Patient Position: Sitting   Pulse: 88   Resp: 16   SpO2: (!) 92%   Weight: 39.1 kg (86 lb 3.2 oz)   Height: 5' 5" (1.651 m)     GENERAL: NAD, " thin  HEENT: normocephalic, non-icteric conjunctivae, moist oral mucosa  RESPIRATORY: clear to auscultation, no wheezes, rales or rhonchi, normal pulmonary effort, on room air CARDIOVASCULAR: Regular rate and rhythm, no murmurs rubs or gallops  SKIN: no rash, jaundice, ecchymosis or ulcers  MUSCULOSKELETAL: No clubbing or cyanosis; no pedal edema  NEUROLOGIC: AO ×3, no gross deficits    LABS:  Lab studies reviewed and notable for CO2 26, SCr 0.73, H/H 12.1/39, SEos 50 (12/2024)    IMAGING:  Imaging reviewed and notable for CXR 12/2024 with lung hyperinflation, no pleural effusion, no pneumothorax, no mass, no consolidation, linear scarring in RML apparent in lateral view    LUNG FUNCTION TESTING:   SPIROMETRY/PFT:  12/2024 Pre Post   FVC 3.44/0.60 3.47/0.66   FEV1 1.54/-2.39 1.52/-2.44   FEV1/FVC 45/-3.78 44/-3.86   TLC 7.10/2.89    FRC  5.06/3.26    RV 3.66/3.25    DLCO 6.82/-7.36        ASSESSMENT & PLAN     1. Other emphysema  Assessment & Plan:  63 yo F with prior nicotine dependence with ongoing exposure and COPD (FEV1 1.52) presents for follow-up visit with treatment for an acute exacerbation on initial presentation.  Overall, she has had some improvement in her breathing.  Ongoing evaluation for her low BMI and hypotension.  She is using Trelegy daily and albuterol nebulizers as needed.  We will reschedule her testing.  - reschedule 6MWT  - cont Trelegy 200 daily and JAZMIN PRN    Orders:  -     Stress test, pulmonary; Future    2. Smoking greater than 40 pack years  Assessment & Plan:  Patient has a 45 pack-year smoking and quit in 2019 . We discussed lung cancer screening with low-dose CT including benefits (early diagnosis, reduced risk of death from lung cancer and decreased worry) and harms (false-positive findings, over-diagnosis, radiation exposure, increased anxiety) of screening and importance of adherence to the screening program with annual imaging at minimum. Patient is agreeable to lung cancer  screening and to this end we will obtain a low-dose CT baseline.   - we will reschedule her LDCT           Follow up in about 6 months (around 6/16/2025).      Case was discussed with patient; all questions were answered to patient's satisfaction and patient verbalized understanding.       Marilin Anders MD  Pulmonary Medicine  [facility]  Phone: [phone]

## 2024-12-16 ENCOUNTER — OFFICE VISIT (OUTPATIENT)
Dept: PULMONOLOGY | Facility: CLINIC | Age: 64
End: 2024-12-16
Payer: COMMERCIAL

## 2024-12-16 VITALS
RESPIRATION RATE: 16 BRPM | SYSTOLIC BLOOD PRESSURE: 102 MMHG | BODY MASS INDEX: 14.36 KG/M2 | HEIGHT: 65 IN | DIASTOLIC BLOOD PRESSURE: 62 MMHG | OXYGEN SATURATION: 92 % | WEIGHT: 86.19 LBS | HEART RATE: 88 BPM

## 2024-12-16 DIAGNOSIS — J43.8 OTHER EMPHYSEMA: Primary | ICD-10-CM

## 2024-12-16 DIAGNOSIS — F17.210 SMOKING GREATER THAN 40 PACK YEARS: ICD-10-CM

## 2024-12-16 PROCEDURE — 1160F RVW MEDS BY RX/DR IN RCRD: CPT | Mod: ,,, | Performed by: STUDENT IN AN ORGANIZED HEALTH CARE EDUCATION/TRAINING PROGRAM

## 2024-12-16 PROCEDURE — 1159F MED LIST DOCD IN RCRD: CPT | Mod: ,,, | Performed by: STUDENT IN AN ORGANIZED HEALTH CARE EDUCATION/TRAINING PROGRAM

## 2024-12-16 PROCEDURE — 99215 OFFICE O/P EST HI 40 MIN: CPT | Mod: PBBFAC | Performed by: STUDENT IN AN ORGANIZED HEALTH CARE EDUCATION/TRAINING PROGRAM

## 2024-12-16 PROCEDURE — 3008F BODY MASS INDEX DOCD: CPT | Mod: ,,, | Performed by: STUDENT IN AN ORGANIZED HEALTH CARE EDUCATION/TRAINING PROGRAM

## 2024-12-16 PROCEDURE — 99999 PR PBB SHADOW E&M-EST. PATIENT-LVL V: CPT | Mod: PBBFAC,,, | Performed by: STUDENT IN AN ORGANIZED HEALTH CARE EDUCATION/TRAINING PROGRAM

## 2024-12-16 PROCEDURE — 99213 OFFICE O/P EST LOW 20 MIN: CPT | Mod: S$PBB,,, | Performed by: STUDENT IN AN ORGANIZED HEALTH CARE EDUCATION/TRAINING PROGRAM

## 2024-12-16 PROCEDURE — 3078F DIAST BP <80 MM HG: CPT | Mod: ,,, | Performed by: STUDENT IN AN ORGANIZED HEALTH CARE EDUCATION/TRAINING PROGRAM

## 2024-12-16 PROCEDURE — 3074F SYST BP LT 130 MM HG: CPT | Mod: ,,, | Performed by: STUDENT IN AN ORGANIZED HEALTH CARE EDUCATION/TRAINING PROGRAM

## 2024-12-16 NOTE — ASSESSMENT & PLAN NOTE
Patient has a 45 pack-year smoking and quit in 2019 . We discussed lung cancer screening with low-dose CT including benefits (early diagnosis, reduced risk of death from lung cancer and decreased worry) and harms (false-positive findings, over-diagnosis, radiation exposure, increased anxiety) of screening and importance of adherence to the screening program with annual imaging at minimum. Patient is agreeable to lung cancer screening and to this end we will obtain a low-dose CT baseline.   - we will reschedule her LDCT

## 2024-12-16 NOTE — ASSESSMENT & PLAN NOTE
63 yo F with prior nicotine dependence with ongoing exposure and COPD (FEV1 1.52) presents for follow-up visit with treatment for an acute exacerbation on initial presentation.  Overall, she has had some improvement in her breathing.  Ongoing evaluation for her low BMI and hypotension.  She is using Trelegy daily and albuterol nebulizers as needed.  We will reschedule her testing.  - reschedule 6MWT  - cont Trelegy 200 daily and JAZMIN PRN

## 2025-01-06 ENCOUNTER — OFFICE VISIT (OUTPATIENT)
Dept: FAMILY MEDICINE | Facility: CLINIC | Age: 65
End: 2025-01-06
Payer: COMMERCIAL

## 2025-01-06 VITALS
SYSTOLIC BLOOD PRESSURE: 126 MMHG | WEIGHT: 82 LBS | HEIGHT: 65 IN | RESPIRATION RATE: 16 BRPM | DIASTOLIC BLOOD PRESSURE: 83 MMHG | OXYGEN SATURATION: 95 % | TEMPERATURE: 99 F | BODY MASS INDEX: 13.66 KG/M2 | HEART RATE: 90 BPM

## 2025-01-06 DIAGNOSIS — J02.9 SORE THROAT: ICD-10-CM

## 2025-01-06 DIAGNOSIS — R05.9 COUGH, UNSPECIFIED TYPE: ICD-10-CM

## 2025-01-06 DIAGNOSIS — U07.1 COVID-19: Primary | ICD-10-CM

## 2025-01-06 LAB
CTP QC/QA: YES
MOLECULAR STREP A: NEGATIVE
POC MOLECULAR INFLUENZA A AGN: NEGATIVE
POC MOLECULAR INFLUENZA B AGN: NEGATIVE
SARS-COV-2 RDRP RESP QL NAA+PROBE: POSITIVE

## 2025-01-06 PROCEDURE — 3074F SYST BP LT 130 MM HG: CPT | Mod: ,,, | Performed by: NURSE PRACTITIONER

## 2025-01-06 PROCEDURE — 87635 SARS-COV-2 COVID-19 AMP PRB: CPT | Mod: QW,,, | Performed by: NURSE PRACTITIONER

## 2025-01-06 PROCEDURE — 1159F MED LIST DOCD IN RCRD: CPT | Mod: ,,, | Performed by: NURSE PRACTITIONER

## 2025-01-06 PROCEDURE — 87502 INFLUENZA DNA AMP PROBE: CPT | Mod: QW,,, | Performed by: NURSE PRACTITIONER

## 2025-01-06 PROCEDURE — 3079F DIAST BP 80-89 MM HG: CPT | Mod: ,,, | Performed by: NURSE PRACTITIONER

## 2025-01-06 PROCEDURE — 1160F RVW MEDS BY RX/DR IN RCRD: CPT | Mod: ,,, | Performed by: NURSE PRACTITIONER

## 2025-01-06 PROCEDURE — 3008F BODY MASS INDEX DOCD: CPT | Mod: ,,, | Performed by: NURSE PRACTITIONER

## 2025-01-06 PROCEDURE — 87651 STREP A DNA AMP PROBE: CPT | Mod: QW,,, | Performed by: NURSE PRACTITIONER

## 2025-01-06 PROCEDURE — 99213 OFFICE O/P EST LOW 20 MIN: CPT | Mod: ,,, | Performed by: NURSE PRACTITIONER

## 2025-01-06 RX ORDER — AZITHROMYCIN 250 MG/1
TABLET, FILM COATED ORAL
Qty: 6 TABLET | Refills: 0 | Status: SHIPPED | OUTPATIENT
Start: 2025-01-06

## 2025-01-06 NOTE — LETTER
January 10, 2025    Neyda Claire  [address] [place] [address]             [facility]  Family Medicine  [address] [facility] [place] [address]  Phone: [phone]  Fax: [phone]   January 10, 2025     Patient: Neyda Claire   YOB: 1960   Date of Visit: 1/6/2025       To Whom it May Concern:    Neyda Claire was seen in my clinic on 1/6/2025. She may return to work on 01/13/2025 .    Please excuse her from any work missed.    If you have any questions or concerns, please don't hesitate to call.    Sincerely,         Samara Solano, SHERIFP

## 2025-01-06 NOTE — PROGRESS NOTES
[facility]    Chief Complaint      Chief Complaint   Patient presents with    Cough    Nasal Congestion    Generalized Body Aches    Emesis    Headache    Sore Throat           Diarrhea    Fever    Abdominal Pain    Documentation Only     Since Friday, has missed work since then has tried everything.        History of Present Illness      Neyda Claire is a 64 y.o. female. She  has a past medical history of Bipolar disorder, unspecified, Cancer, COPD (chronic obstructive pulmonary disease), and Mental disorder., who presents today for URI type symptoms- cough, congestion, body aches, N/V, HA, ST, diarrhea, fever, and abdominal pain x2-3 days.    Past Medical History:  Past Medical History:   Diagnosis Date    Bipolar disorder, unspecified     Cancer     basal cell carcinoma top of head, cervical ca at age 16    COPD (chronic obstructive pulmonary disease)     Mental disorder        Past Surgical History:   has a past surgical history that includes Hysterectomy; Tubal ligation; Laparoscopy (2023); repair, hernia, inguinal (2023); excision, small intestine (N/A, 2023); Appendectomy (2023); Cholangiogram (N/A, 2023); Cholecystectomy (2023); Lysis of adhesions (2023); and Oophorectomy.    Social History:  Social History     Tobacco Use    Smoking status: Former     Current packs/day: 0.00     Average packs/day: 1 pack/day for 44.6 years (44.6 ttl pk-yrs)     Types: Cigarettes     Start date:      Quit date: 2019     Years since quittin.4    Smokeless tobacco: Never   Substance Use Topics    Alcohol use: Not Currently    Drug use: Never       I personally reviewed all past medical, surgical, and social.     Review of Systems   Constitutional:  Positive for fever.   HENT:  Positive for congestion, ear pain and sore throat.    Respiratory:  Positive for cough and wheezing.    Cardiovascular:  Positive for chest pain.   Gastrointestinal:  Positive for abdominal  pain, diarrhea and nausea. Negative for vomiting.   Genitourinary:  Negative for dysuria.   Skin:  Negative for rash.   Neurological:  Positive for headaches.        Medications:  Outpatient Encounter Medications as of 1/6/2025   Medication Sig Dispense Refill    albuterol (PROVENTIL) 2.5 mg /3 mL (0.083 %) nebulizer solution Take 3 mLs (2.5 mg total) by nebulization every 6 (six) hours as needed for Wheezing. 90 mL 1    albuterol (VENTOLIN HFA) 90 mcg/actuation inhaler Inhale 2 puffs into the lungs every 4 (four) hours as needed for Wheezing or Shortness of Breath. Rescue 18 g 11    benzonatate (TESSALON) 100 MG capsule Take 1 capsule (100 mg total) by mouth 3 (three) times daily as needed for Cough. 90 capsule 1    fluticasone-umeclidin-vilanter (TRELEGY ELLIPTA) 200-62.5-25 mcg inhaler Inhale 1 puff into the lungs once daily. 60 each 11    meloxicam (MOBIC) 15 MG tablet Take 1 tablet (15 mg total) by mouth once daily. 30 tablet 1    montelukast (SINGULAIR) 10 mg tablet Take 1 tablet (10 mg total) by mouth every evening. 90 tablet 1    pantoprazole (PROTONIX) 40 MG tablet Take 1 tablet (40 mg total) by mouth once daily. 90 tablet 0    QUEtiapine (SEROQUEL) 25 MG Tab Take 1 tablet (25 mg total) by mouth every evening. 90 tablet 0    tiZANidine (ZANAFLEX) 4 MG tablet Take 1 tablet (4 mg total) by mouth every 6 (six) hours as needed (Neck pain). 30 tablet 2    traZODone (DESYREL) 100 MG tablet Take 1 tablet (100 mg total) by mouth every evening. 90 tablet 0    azithromycin (Z-SILVIO) 250 MG tablet Take 2 tablets by mouth on day 1; Take 1 tablet by mouth on days 2-5 6 tablet 0    clonazePAM (KLONOPIN) 1 MG tablet Take 1 mg by mouth. (Patient not taking: Reported on 12/16/2024)      ED A-HIST 4-10 mg per tablet Take 1 tablet by mouth 2 (two) times daily. (Patient not taking: Reported on 12/16/2024)       No facility-administered encounter medications on file as of 1/6/2025.       Allergies:  Review of patient's allergies  "indicates:   Allergen Reactions    Cyclobenzaprine Anxiety, Itching, Palpitations and Shortness Of Breath    Sulfa (sulfonamide antibiotics)        Health Maintenance:  Immunization History   Administered Date(s) Administered    COVID-19, MRNA, LN-S, PF (MODERNA FULL 0.5 ML DOSE) 03/12/2021, 04/09/2021    Pneumococcal Conjugate - 20 Valent 11/18/2024    Tdap 06/17/2019      Health Maintenance   Topic Date Due    LDCT Lung Screen  Never done    Shingles Vaccine (1 of 2) Never done    RSV Vaccine (Age 60+ and Pregnant patients) (1 - Risk 60-74 years 1-dose series) Never done    Influenza Vaccine (1) Never done    COVID-19 Vaccine (3 - 2024-25 season) 09/01/2024    Mammogram  05/07/2025    Colorectal Cancer Screening  11/27/2027    Lipid Panel  02/22/2028    TETANUS VACCINE  06/17/2029    Hepatitis C Screening  Completed    HIV Screening  Completed    Pneumococcal Vaccines (Age 50+)  Completed        Physical Exam      Vital Signs  Temp: 99.1 °F (37.3 °C)  Temp Source: Oral  Pulse: 90  Resp: 16  SpO2: 95 %  BP: 126/83  Pain Score:   5  Pain Loc: Generalized  Height and Weight  Height: 5' 5" (165.1 cm)  Weight: 37.2 kg (82 lb)  BSA (Calculated - sq m): 1.31 sq meters  BMI (Calculated): 13.6  Weight in (lb) to have BMI = 25: 149.9]    Physical Exam  Vitals reviewed.   Constitutional:       General: She is not in acute distress.     Appearance: She is well-developed and underweight. She is ill-appearing.   HENT:      Head: Normocephalic.      Right Ear: Hearing normal.      Left Ear: Hearing normal.      Nose: Congestion present.   Eyes:      General: Lids are normal.      Extraocular Movements: Extraocular movements intact.      Conjunctiva/sclera: Conjunctivae normal.      Pupils: Pupils are equal, round, and reactive to light.   Cardiovascular:      Rate and Rhythm: Normal rate.   Pulmonary:      Effort: Pulmonary effort is normal. No respiratory distress.   Musculoskeletal:         General: Normal range of motion.     "  Cervical back: Normal range of motion and neck supple.   Skin:     General: Skin is warm and dry.   Neurological:      Mental Status: She is alert and oriented to person, place, and time.      Gait: Gait is intact.   Psychiatric:         Behavior: Behavior is cooperative.          Laboratory:  CBC:  Recent Labs   Lab 12/19/23  0258 12/20/23  0413 12/05/24  1652   WBC 10.87 10.95 12.11 H   RBC 4.33 4.30 3.97 L   Hemoglobin 13.4 13.3 12.1   Hematocrit 39.9 38.6 39.0   Platelet Count 256 294 408 H   MCV 92.1 89.8 98.2 H   MCH 30.9 30.9 30.5   MCHC 33.6 34.5 31.0 L     CMP:  Recent Labs   Lab 07/05/23  1626 12/12/23  1330 12/14/23  0315 12/05/24  1652   Glucose 81 102   < > 175 H   Calcium 9.5 8.9   < > 8.7   Albumin 4.3 3.7  --  3.7   Total Protein 7.5 7.3  --  6.6   Sodium 138 135 L   < > 139   Potassium 4.8 3.5   < > 3.7   CO2 31 23   < > 26   Carbon Dioxide  --   --    < >  --    Chloride 104 103   < > 106   BUN 10 26 H   < > 16   Blood Urea Nitrogen  --   --    < >  --    Alk Phos 65 66  --  79   ALT 29 23  --  26   AST 24 21  --  35 H   Bilirubin, Total 0.3 0.6  --  0.3    < > = values in this interval not displayed.     LIPIDS:  Recent Labs   Lab 03/29/22  0919 02/22/23  1730   TSH  --  0.551   HDL Cholesterol 71 H 81 H   Cholesterol 174 189   Triglycerides 60 63   LDL Calculated 91 95   Cholesterol/HDL Ratio (Risk Factor) 2.5 2.3   Non- 108     TSH:  Recent Labs   Lab 02/22/23  1730   TSH 0.551     A1C:  Recent Labs   Lab 02/22/23  1730 07/05/23  1626   Hemoglobin A1C 5.6 5.5       Assessment/Plan     Neyda Claire is a 64 y.o.female with:     1. COVID-19  -     azithromycin (Z-SILVIO) 250 MG tablet; Take 2 tablets by mouth on day 1; Take 1 tablet by mouth on days 2-5  Dispense: 6 tablet; Refill: 0    2. Sore throat  -     POCT Strep A, Molecular    3. Cough, unspecified type  -     POCT Influenza A/B Molecular  -     POCT COVID-19 Rapid Screening         Total time spent face-to-face and non-face-to-face  coordinating care for this encounter was: 20 minutes    Chronic conditions status updated as per HPI.  Other than changes above, cont current medications and maintain follow up with specialists.  Return to clinic prn if symptoms worsen or fail to improve.    SHEILA Hodges  [facility]  Answers submitted by the patient for this visit:  Fever Questionnaire (Submitted on 1/6/2025)  Chief Complaint: Fever  Chronicity: recurrent  Onset: in the past 7 days  Frequency: 2 to 4 times per day  Progression since onset: waxing and waning  Max temp prior to arrival: 102 to 102.9 F  Temperature source: an oral thermometer  muscle aches: Yes  sleepiness: No  Treatments tried: acetaminophen, fluids  Improvement on treatment: mild

## 2025-01-06 NOTE — LETTER
SURGEON:  Funmilayo Pa M.D.    PREOPERATIVE DIAGNOSIS:    Dislocated IOL right Eye    POSTOPERATIVE DIAGNOSIS:    Dislocated IOL right Eye    PROCEDURES:    1) Pars Plana vitrectomy  2) Dislocated IOL removal   3) Secondary IOL implantation with scleral fixation  4) Subconjunctival injection of antibiotic and steroid     DATE OF SURGERY:  9/7/2023      ANESTHESIA:  MAC with Retrobulbar block    COMPLICATIONS:  None    ESTIMATED BLOOD LOSS: None    SPECIMENS: None    INDICATIONS:    The patient has a history of painless progressive visual loss and  difficulty with activities of daily living secondary to dislocation of the IOL.  After a thorough discussion of the risks, benefits, and alternatives to surgery, including, but not limited to, the rare risks of infection, retinal detachment, hemorrhage, need for additional surgery, loss of vision, and even loss of the eye, the patient voices understanding and desires to proceed.    DESCRIPTION OF PROCEDURE:       After verification and marking of the proper eye in the preop holding area, the patient was brought to the operating room in supine position where the eye was prepped and draped in standard sterile fashion with 5% Betadine and a lid speculum placed in the eye.   A retrobulbar block was used in addition to the preoperative anesthesia and the procedure was begun by the creation of 2 paracentesis incisions 180 degrees apart, through which viscoelastic was used to fill the anterior chamber.  Next, crescent and keratome blades were used to create a triplanar superior scleral tunnel incision. A 23G trocar was introduced into the Pars Plana, and a thorough vitrectomy was performed around the IOL. The IOL was then removed with the capsular bag through the scleral tunnel incision.  The new IOl was introduced into the AC, and 2 30G needles were used for ab externo positioning of the IOL haptics. Once the haptics were docked into the needles, the haptics were externalized  January 6, 2025    Neyda Claire  [address] [place] [address]             [facility]  Family Medicine  [address] [facility] [place] [address]  Phone: [phone]  Fax: [phone]   January 6, 2025     Patient: Neyda Claire   YOB: 1960   Date of Visit: 1/6/2025       To Whom it May Concern:    Neyda Claire was seen in my clinic on 1/6/2025. She may return to work on 1/11/25 .    Please excuse her from any classes or work missed from 1/3/2025 until (as stated above).    If you have any questions or concerns, please don't hesitate to call.    Sincerely,         Samara Solano, SHERIFP      and high temp cautery used to creaet a flange for fixation to the sclera. Once fixation was achieved, the IOL was found to be centered with minimal pupil irregularity.   All remaining viscoelastics were removed from the eye and at the end of the case the pupil was round, the lens was well-centered and stable and all wounds were found to be water tight. Subconjuncitval injection of antibiotics and steroid were administered, and the patient was patched. The patient will follow up with Dr. Pa in the morning.

## 2025-01-10 ENCOUNTER — PATIENT MESSAGE (OUTPATIENT)
Dept: FAMILY MEDICINE | Facility: CLINIC | Age: 65
End: 2025-01-10
Payer: COMMERCIAL

## 2025-01-14 ENCOUNTER — PATIENT OUTREACH (OUTPATIENT)
Facility: HOSPITAL | Age: 65
End: 2025-01-14
Payer: COMMERCIAL

## 2025-01-14 NOTE — PROGRESS NOTES
Population Health Chart Review & Patient Outreach Details  Per [facility] website, insurance is active and pt is listed on the attributed list needing a healthy you performed in   HY scheduled for 2025    Further Action Needed If Patient Returns Outreach:            Updates Requested / Reviewed:     []  Care Everywhere    []     []  External Sources ([facility], [facility], DIS, etc.)    [] [facility]   [] [facility]   [] Other:    []  Care Team Updated   []  Removed  or Duplicate Orders   []  Immunization Reconciliation Completed / Queried    [] [place]   [] [place]   [] [place]   [] [place]      Health Maintenance Topics Addressed and Outreach Outcomes / Actions Taken:             Breast Cancer Screening []  Mammogram Order Placed    []  Mammogram Screening Scheduled    []  External Records Requested & Care Team Updated if Applicable    []  External Records Uploaded & Care Team Updated if Applicable    []  Pt Declined Scheduling Mammogram    []  Pt Will Schedule with External Provider / Order Routed & Care Team Updated if Applicable              Cervical Cancer Screening []  Pap Smear Scheduled in Primary Care or OBGYN    []  External Records Requested & Care Team Updated if Applicable       []  External Records Uploaded, Care Team Updated, & History Updated if Applicable    []  Patient Declined Scheduling Pap Smear    []  Patient Will Schedule with External Provider & Care Team Updated if Applicable                  Colorectal Cancer Screening []  Colonoscopy Case Request / Referral / Home Test Order Placed    []  External Records Requested & Care Team Updated if Applicable    []  External Records Uploaded, Care Team Updated, & History Updated if Applicable    []  Patient Declined Completing Colon Cancer Screening    []  Patient Will Schedule with External Provider & Care Team Updated if Applicable    []  Fit Kit Mailed (add the SmartPhrase under additional notes)    []  Reminded Patient to Complete  Home Test                Diabetic Eye Exam []  Eye Exam Screening Order Placed    []  Eye Camera Scheduled or Optometry/Ophthalmology Referral Placed    []  External Records Requested & Care Team Updated if Applicable    []  External Records Uploaded, Care Team Updated, & History Updated if Applicable    []  Patient Declined Scheduling Eye Exam    []  Patient Will Schedule with External Provider & Care Team Updated if Applicable             Blood Pressure Control []  Primary Care Follow Up Visit Scheduled     []  Remote Blood Pressure Reading Captured    []  Patient Declined Remote Reading or Scheduling Appt - Escalated to PCP    []  Patient Will Call Back or Send Portal Message with Reading                 HbA1c & Other Labs []  Overdue Lab(s) Ordered    []  Overdue Lab(s) Scheduled    []  External Records Uploaded & Care Team Updated if Applicable    []  Primary Care Follow Up Visit Scheduled     []  Reminded Patient to Complete A1c Home Test    []  Patient Declined Scheduling Labs or Will Call Back to Schedule    []  Patient Will Schedule with External Provider / Order Routed, & Care Team Updated if Applicable           Primary Care Appointment []  Primary Care Appt Scheduled    []  Patient Declined Scheduling or Will Call Back to Schedule    []  Pt Established with External Provider, Updated Care Team, & Informed Pt to Notify Payor if Applicable           Medication Adherence /    Statin Use []  Primary Care Appointment Scheduled    []  Patient Reminded to  Prescription    []  Patient Declined, Provider Notified if Needed    []  Sent Provider Message to Review to Evaluate Pt for Statin, Add Exclusion Dx Codes, Document   Exclusion in Problem List, Change Statin Intensity Level to Moderate or High Intensity if Applicable                Osteoporosis Screening []  Dexa Order Placed    []  Dexa Appointment Scheduled    []  External Records Requested & Care Team Updated    []  External Records Uploaded, Care  Team Updated, & History Updated if Applicable    []  Patient Declined Scheduling Dexa or Will Call Back to Schedule    []  Patient Will Schedule with External Provider / Order Routed & Care Team Updated if Applicable       Additional Notes:

## 2025-01-21 ENCOUNTER — PATIENT OUTREACH (OUTPATIENT)
Facility: HOSPITAL | Age: 65
End: 2025-01-21
Payer: COMMERCIAL

## 2025-01-21 NOTE — PROGRESS NOTES
Population Health Chart Review & Patient Outreach Details  Per [facility] website, insurance is active and pt is listed on the attributed list needing a healthy you performed in   HY scheduled for 25    Further Action Needed If Patient Returns Outreach:            Updates Requested / Reviewed:     []  Care Everywhere    []     []  External Sources ([facility], [facility], DIS, etc.)    [] [facility]   [] [facility]   [] Other:    []  Care Team Updated   []  Removed  or Duplicate Orders   []  Immunization Reconciliation Completed / Queried    [] [place]   [] [place]   [] [place]   [] [place]      Health Maintenance Topics Addressed and Outreach Outcomes / Actions Taken:             Breast Cancer Screening []  Mammogram Order Placed    []  Mammogram Screening Scheduled    []  External Records Requested & Care Team Updated if Applicable    []  External Records Uploaded & Care Team Updated if Applicable    []  Pt Declined Scheduling Mammogram    []  Pt Will Schedule with External Provider / Order Routed & Care Team Updated if Applicable              Cervical Cancer Screening []  Pap Smear Scheduled in Primary Care or OBGYN    []  External Records Requested & Care Team Updated if Applicable       []  External Records Uploaded, Care Team Updated, & History Updated if Applicable    []  Patient Declined Scheduling Pap Smear    []  Patient Will Schedule with External Provider & Care Team Updated if Applicable                  Colorectal Cancer Screening []  Colonoscopy Case Request / Referral / Home Test Order Placed    []  External Records Requested & Care Team Updated if Applicable    []  External Records Uploaded, Care Team Updated, & History Updated if Applicable    []  Patient Declined Completing Colon Cancer Screening    []  Patient Will Schedule with External Provider & Care Team Updated if Applicable    []  Fit Kit Mailed (add the SmartPhrase under additional notes)    []  Reminded Patient to Complete Home  Test                Diabetic Eye Exam []  Eye Exam Screening Order Placed    []  Eye Camera Scheduled or Optometry/Ophthalmology Referral Placed    []  External Records Requested & Care Team Updated if Applicable    []  External Records Uploaded, Care Team Updated, & History Updated if Applicable    []  Patient Declined Scheduling Eye Exam    []  Patient Will Schedule with External Provider & Care Team Updated if Applicable             Blood Pressure Control []  Primary Care Follow Up Visit Scheduled     []  Remote Blood Pressure Reading Captured    []  Patient Declined Remote Reading or Scheduling Appt - Escalated to PCP    []  Patient Will Call Back or Send Portal Message with Reading                 HbA1c & Other Labs []  Overdue Lab(s) Ordered    []  Overdue Lab(s) Scheduled    []  External Records Uploaded & Care Team Updated if Applicable    []  Primary Care Follow Up Visit Scheduled     []  Reminded Patient to Complete A1c Home Test    []  Patient Declined Scheduling Labs or Will Call Back to Schedule    []  Patient Will Schedule with External Provider / Order Routed, & Care Team Updated if Applicable           Primary Care Appointment []  Primary Care Appt Scheduled    []  Patient Declined Scheduling or Will Call Back to Schedule    []  Pt Established with External Provider, Updated Care Team, & Informed Pt to Notify Payor if Applicable           Medication Adherence /    Statin Use []  Primary Care Appointment Scheduled    []  Patient Reminded to  Prescription    []  Patient Declined, Provider Notified if Needed    []  Sent Provider Message to Review to Evaluate Pt for Statin, Add Exclusion Dx Codes, Document   Exclusion in Problem List, Change Statin Intensity Level to Moderate or High Intensity if Applicable                Osteoporosis Screening []  Dexa Order Placed    []  Dexa Appointment Scheduled    []  External Records Requested & Care Team Updated    []  External Records Uploaded, Care Team  Updated, & History Updated if Applicable    []  Patient Declined Scheduling Dexa or Will Call Back to Schedule    []  Patient Will Schedule with External Provider / Order Routed & Care Team Updated if Applicable       Additional Notes:

## 2025-01-23 ENCOUNTER — OFFICE VISIT (OUTPATIENT)
Dept: FAMILY MEDICINE | Facility: CLINIC | Age: 65
End: 2025-01-23
Payer: COMMERCIAL

## 2025-01-23 ENCOUNTER — CLINICAL SUPPORT (OUTPATIENT)
Dept: PULMONOLOGY | Facility: HOSPITAL | Age: 65
End: 2025-01-23
Attending: INTERNAL MEDICINE
Payer: COMMERCIAL

## 2025-01-23 ENCOUNTER — HOSPITAL ENCOUNTER (OUTPATIENT)
Dept: RADIOLOGY | Facility: HOSPITAL | Age: 65
Discharge: HOME | End: 2025-01-23
Attending: STUDENT IN AN ORGANIZED HEALTH CARE EDUCATION/TRAINING PROGRAM
Payer: COMMERCIAL

## 2025-01-23 VITALS
SYSTOLIC BLOOD PRESSURE: 103 MMHG | RESPIRATION RATE: 17 BRPM | HEIGHT: 65 IN | TEMPERATURE: 98 F | BODY MASS INDEX: 14.66 KG/M2 | DIASTOLIC BLOOD PRESSURE: 72 MMHG | WEIGHT: 88 LBS | OXYGEN SATURATION: 95 % | HEART RATE: 95 BPM

## 2025-01-23 VITALS — HEIGHT: 65 IN | WEIGHT: 89 LBS | BODY MASS INDEX: 14.83 KG/M2

## 2025-01-23 DIAGNOSIS — J43.8 OTHER EMPHYSEMA: ICD-10-CM

## 2025-01-23 DIAGNOSIS — F17.210 SMOKING GREATER THAN 40 PACK YEARS: ICD-10-CM

## 2025-01-23 DIAGNOSIS — R05.9 COUGH, UNSPECIFIED TYPE: ICD-10-CM

## 2025-01-23 DIAGNOSIS — U07.1 COVID-19: ICD-10-CM

## 2025-01-23 DIAGNOSIS — J20.9 ACUTE BRONCHITIS, UNSPECIFIED ORGANISM: Primary | ICD-10-CM

## 2025-01-23 PROCEDURE — 99213 OFFICE O/P EST LOW 20 MIN: CPT | Mod: ,,, | Performed by: NURSE PRACTITIONER

## 2025-01-23 PROCEDURE — 1159F MED LIST DOCD IN RCRD: CPT | Mod: ,,, | Performed by: NURSE PRACTITIONER

## 2025-01-23 PROCEDURE — 71271 CT THORAX LUNG CANCER SCR C-: CPT | Mod: TC

## 2025-01-23 PROCEDURE — 3074F SYST BP LT 130 MM HG: CPT | Mod: ,,, | Performed by: NURSE PRACTITIONER

## 2025-01-23 PROCEDURE — 94618 PULMONARY STRESS TESTING: CPT

## 2025-01-23 PROCEDURE — 1160F RVW MEDS BY RX/DR IN RCRD: CPT | Mod: ,,, | Performed by: NURSE PRACTITIONER

## 2025-01-23 PROCEDURE — 3008F BODY MASS INDEX DOCD: CPT | Mod: ,,, | Performed by: NURSE PRACTITIONER

## 2025-01-23 PROCEDURE — 3078F DIAST BP <80 MM HG: CPT | Mod: ,,, | Performed by: NURSE PRACTITIONER

## 2025-01-23 RX ORDER — METHYLPREDNISOLONE 4 MG/1
TABLET ORAL
Qty: 21 EACH | Refills: 0 | Status: SHIPPED | OUTPATIENT
Start: 2025-01-23

## 2025-01-23 RX ORDER — AMOXICILLIN AND CLAVULANATE POTASSIUM 875; 125 MG/1; MG/1
1 TABLET, FILM COATED ORAL 2 TIMES DAILY
Qty: 14 TABLET | Refills: 0 | Status: SHIPPED | OUTPATIENT
Start: 2025-01-23 | End: 2025-01-30

## 2025-01-23 RX ORDER — PROMETHAZINE HYDROCHLORIDE AND DEXTROMETHORPHAN HYDROBROMIDE 6.25; 15 MG/5ML; MG/5ML
5 SYRUP ORAL EVERY 4 HOURS PRN
Qty: 118 ML | Refills: 0 | Status: SHIPPED | OUTPATIENT
Start: 2025-01-23 | End: 2025-02-02

## 2025-01-23 NOTE — PROGRESS NOTES
[facility]    Chief Complaint      Chief Complaint   Patient presents with    Cough     Taking tessalon but cough has continued since covid, non pro but nose has been bleeding, coughing has made her ache all over.        History of Present Illness      Neyda Claire is a 64 y.o. female. She  has a past medical history of Bipolar disorder, unspecified, Cancer, COPD (chronic obstructive pulmonary disease), and Mental disorder., who presents today for continued complaints of cough- patient had Covid on 25 and states the cough hasn't let up and now she is sore all over in her chest and back from the coughing.  She is also scheduled for pulmonary testing this afternoon.     Past Medical History:  Past Medical History:   Diagnosis Date    Bipolar disorder, unspecified     Cancer     basal cell carcinoma top of head, cervical ca at age 16    COPD (chronic obstructive pulmonary disease)     Mental disorder        Past Surgical History:   has a past surgical history that includes Hysterectomy; Tubal ligation; Laparoscopy (2023); repair, hernia, inguinal (2023); excision, small intestine (N/A, 2023); Appendectomy (2023); Cholangiogram (N/A, 2023); Cholecystectomy (2023); Lysis of adhesions (2023); and Oophorectomy.    Social History:  Social History     Tobacco Use    Smoking status: Former     Current packs/day: 0.00     Average packs/day: 1 pack/day for 44.6 years (44.6 ttl pk-yrs)     Types: Cigarettes     Start date:      Quit date: 2019     Years since quittin.4    Smokeless tobacco: Never   Substance Use Topics    Alcohol use: Not Currently    Drug use: Never       I personally reviewed all past medical, surgical, and social.     Review of Systems   Constitutional:  Positive for chills and fever.   HENT:  Positive for ear pain, rhinorrhea and sore throat.    Respiratory:  Positive for cough, shortness of breath and wheezing.    Cardiovascular:  Positive  for chest pain.   Musculoskeletal:  Positive for myalgias.   Allergic/Immunologic: Negative for environmental allergies.   Neurological:  Positive for headaches.        Medications:  Outpatient Encounter Medications as of 1/23/2025   Medication Sig Dispense Refill    albuterol (PROVENTIL) 2.5 mg /3 mL (0.083 %) nebulizer solution Take 3 mLs (2.5 mg total) by nebulization every 6 (six) hours as needed for Wheezing. 90 mL 1    albuterol (VENTOLIN HFA) 90 mcg/actuation inhaler Inhale 2 puffs into the lungs every 4 (four) hours as needed for Wheezing or Shortness of Breath. Rescue 18 g 11    benzonatate (TESSALON) 100 MG capsule Take 1 capsule (100 mg total) by mouth 3 (three) times daily as needed for Cough. 90 capsule 1    fluticasone-umeclidin-vilanter (TRELEGY ELLIPTA) 200-62.5-25 mcg inhaler Inhale 1 puff into the lungs once daily. 60 each 11    meloxicam (MOBIC) 15 MG tablet Take 1 tablet (15 mg total) by mouth once daily. 30 tablet 1    montelukast (SINGULAIR) 10 mg tablet Take 1 tablet (10 mg total) by mouth every evening. 90 tablet 1    pantoprazole (PROTONIX) 40 MG tablet Take 1 tablet (40 mg total) by mouth once daily. 90 tablet 0    QUEtiapine (SEROQUEL) 25 MG Tab Take 1 tablet (25 mg total) by mouth every evening. 90 tablet 0    tiZANidine (ZANAFLEX) 4 MG tablet Take 1 tablet (4 mg total) by mouth every 6 (six) hours as needed (Neck pain). 30 tablet 2    traZODone (DESYREL) 100 MG tablet Take 1 tablet (100 mg total) by mouth every evening. 90 tablet 0    amoxicillin-clavulanate 875-125mg (AUGMENTIN) 875-125 mg per tablet Take 1 tablet by mouth 2 (two) times daily. for 7 days 14 tablet 0    azithromycin (Z-SILVIO) 250 MG tablet Take 2 tablets by mouth on day 1; Take 1 tablet by mouth on days 2-5 (Patient not taking: Reported on 1/23/2025) 6 tablet 0    clonazePAM (KLONOPIN) 1 MG tablet Take 1 mg by mouth. (Patient not taking: Reported on 12/16/2024)      ED A-HIST 4-10 mg per tablet Take 1 tablet by mouth 2  "(two) times daily. (Patient not taking: Reported on 1/23/2025)      methylPREDNISolone (MEDROL DOSEPACK) 4 mg tablet use as directed 21 each 0    promethazine-dextromethorphan (PROMETHAZINE-DM) 6.25-15 mg/5 mL Syrp Take 5 mLs by mouth every 4 (four) hours as needed (Cough). 118 mL 0     No facility-administered encounter medications on file as of 1/23/2025.       Allergies:  Review of patient's allergies indicates:   Allergen Reactions    Cyclobenzaprine Anxiety, Itching, Palpitations and Shortness Of Breath    Sulfa (sulfonamide antibiotics)        Health Maintenance:  Immunization History   Administered Date(s) Administered    COVID-19, MRNA, LN-S, PF (MODERNA FULL 0.5 ML DOSE) 03/12/2021, 04/09/2021    Pneumococcal Conjugate - 20 Valent 11/18/2024    Tdap 06/17/2019      Health Maintenance   Topic Date Due    Shingles Vaccine (1 of 2) Never done    RSV Vaccine (Age 60+ and Pregnant patients) (1 - Risk 60-74 years 1-dose series) Never done    Influenza Vaccine (1) Never done    COVID-19 Vaccine (3 - 2024-25 season) 09/01/2024    Mammogram  05/07/2025    LDCT Lung Screen  01/23/2026    Colorectal Cancer Screening  11/27/2027    Lipid Panel  02/22/2028    TETANUS VACCINE  06/17/2029    Hepatitis C Screening  Completed    HIV Screening  Completed    Pneumococcal Vaccines (Age 50+)  Completed        Physical Exam      Vital Signs  Temp: 98.1 °F (36.7 °C)  Temp Source: Oral  Pulse: 95  Resp: 17  SpO2: 95 %  BP: 103/72  Pain Score: 0-No pain  Height and Weight  Height: 5' 5" (165.1 cm)  Weight: 39.9 kg (88 lb)  BSA (Calculated - sq m): 1.35 sq meters  BMI (Calculated): 14.6  Weight in (lb) to have BMI = 25: 149.9]    Physical Exam  Vitals and nursing note reviewed.   Constitutional:       General: She is not in acute distress.     Appearance: Normal appearance. She is well-developed and underweight.   HENT:      Head: Normocephalic.      Right Ear: Hearing normal.      Left Ear: Hearing normal.      Nose: Congestion " present.   Eyes:      General: Lids are normal.      Conjunctiva/sclera: Conjunctivae normal.   Cardiovascular:      Rate and Rhythm: Normal rate and regular rhythm.      Heart sounds: Normal heart sounds.   Pulmonary:      Effort: Pulmonary effort is normal.      Breath sounds: Normal breath sounds. No wheezing.   Musculoskeletal:         General: Normal range of motion.      Cervical back: Normal range of motion and neck supple.   Skin:     General: Skin is warm and dry.   Neurological:      Mental Status: She is alert and oriented to person, place, and time.      Gait: Gait is intact.   Psychiatric:         Behavior: Behavior is cooperative.          Laboratory:  CBC:  Recent Labs   Lab 12/19/23  0258 12/20/23  0413 12/05/24  1652   WBC 10.87 10.95 12.11 H   RBC 4.33 4.30 3.97 L   Hemoglobin 13.4 13.3 12.1   Hematocrit 39.9 38.6 39.0   Platelet Count 256 294 408 H   MCV 92.1 89.8 98.2 H   MCH 30.9 30.9 30.5   MCHC 33.6 34.5 31.0 L     CMP:  Recent Labs   Lab 07/05/23  1626 12/12/23  1330 12/14/23  0315 12/05/24  1652   Glucose 81 102   < > 175 H   Calcium 9.5 8.9   < > 8.7   Albumin 4.3 3.7  --  3.7   Total Protein 7.5 7.3  --  6.6   Sodium 138 135 L   < > 139   Potassium 4.8 3.5   < > 3.7   CO2 31 23   < > 26   Carbon Dioxide  --   --    < >  --    Chloride 104 103   < > 106   BUN 10 26 H   < > 16   Blood Urea Nitrogen  --   --    < >  --    Alk Phos 65 66  --  79   ALT 29 23  --  26   AST 24 21  --  35 H   Bilirubin, Total 0.3 0.6  --  0.3    < > = values in this interval not displayed.     LIPIDS:  Recent Labs   Lab 03/29/22  0919 02/22/23  1730   TSH  --  0.551   HDL Cholesterol 71 H 81 H   Cholesterol 174 189   Triglycerides 60 63   LDL Calculated 91 95   Cholesterol/HDL Ratio (Risk Factor) 2.5 2.3   Non- 108     TSH:  Recent Labs   Lab 02/22/23  1730   TSH 0.551     A1C:  Recent Labs   Lab 02/22/23  1730 07/05/23  1626   Hemoglobin A1C 5.6 5.5       Assessment/Plan     Neyda Claire is a 64  y.o.female with:     1. Acute bronchitis, unspecified organism  -     amoxicillin-clavulanate 875-125mg (AUGMENTIN) 875-125 mg per tablet; Take 1 tablet by mouth 2 (two) times daily. for 7 days  Dispense: 14 tablet; Refill: 0  -     methylPREDNISolone (MEDROL DOSEPACK) 4 mg tablet; use as directed  Dispense: 21 each; Refill: 0  -     promethazine-dextromethorphan (PROMETHAZINE-DM) 6.25-15 mg/5 mL Syrp; Take 5 mLs by mouth every 4 (four) hours as needed (Cough).  Dispense: 118 mL; Refill: 0    2. COVID-19    3. Cough, unspecified type  -     methylPREDNISolone (MEDROL DOSEPACK) 4 mg tablet; use as directed  Dispense: 21 each; Refill: 0  -     promethazine-dextromethorphan (PROMETHAZINE-DM) 6.25-15 mg/5 mL Syrp; Take 5 mLs by mouth every 4 (four) hours as needed (Cough).  Dispense: 118 mL; Refill: 0         Total time spent face-to-face and non-face-to-face coordinating care for this encounter was: 20 minutes     Chronic conditions status updated as per HPI.  Other than changes above, cont current medications and maintain follow up with specialists.  Return to clinic prn if symptoms worsen or fail to improve.    Samara Solano, CARINA  [facility]  Answers submitted by the patient for this visit:  Cough Questionnaire (Submitted on 1/17/2025)  Chief Complaint: Cough  Chronicity: chronic  Onset: more than 1 year ago  Progression since onset: waxing and waning  Frequency: constantly  Cough characteristics: productive of purulent sputum  nasal congestion: Yes  sweats: Yes  Aggravated by: lying down  Risk factors for lung disease: occupational exposure  asthma: No  bronchiectasis: No  bronchitis: Yes  COPD: Yes  emphysema: Yes  pneumonia: No  Treatments tried: OTC cough suppressant, a beta-agonist inhaler, body position changes, prescription cough suppressant, rest  Improvement on treatment: mild

## 2025-01-24 PROCEDURE — 94618 PULMONARY STRESS TESTING: CPT | Mod: 26,,, | Performed by: STUDENT IN AN ORGANIZED HEALTH CARE EDUCATION/TRAINING PROGRAM

## 2025-01-24 NOTE — PROCEDURES
SIX MINUTE WALK DISTANCE  BASELINE VITALS  Heart rate 90, /67, SpO2 96% on room air     END OF STUDY VITALS:  Heart rate 97, /76, SpO2 95% on room air    RECOVERY VITALS:  After 1 min rest  Heart rate 81, /71, SpO2 97% on room air    Patient walked 1398 ft with 0 rests.   SpO2 edmundo was 95% at 3 minutes.       IMPRESSION:  Patient has tachycardia with ambulation that resolves with rest.  Patient does not need oxygen supplementation at rest at rest or with ambulation.      Marilin Anders MD  Pulmonary and Critical Care  [facility]

## 2025-02-11 ENCOUNTER — TELEPHONE (OUTPATIENT)
Dept: PULMONOLOGY | Facility: CLINIC | Age: 65
End: 2025-02-11
Payer: COMMERCIAL

## 2025-02-11 NOTE — TELEPHONE ENCOUNTER
Spoke with patient regarding results per Dr. Anders, Pt aware and acknowledged verbal results. No questions or concerns at this time.

## 2025-02-11 NOTE — TELEPHONE ENCOUNTER
----- Message from Marilin Anders MD sent at 2/11/2025  1:20 PM CST -----  Kindly notify our patient that her CT scan shows no concerning spots in her lung and we will continue with imaging on a yearly basis. There is evidence of emphysema which is consistent with her COPD diagnosis. We will review imaging on her follow up visit in June.

## 2025-02-13 ENCOUNTER — OFFICE VISIT (OUTPATIENT)
Dept: FAMILY MEDICINE | Facility: CLINIC | Age: 65
End: 2025-02-13
Payer: COMMERCIAL

## 2025-02-13 VITALS
TEMPERATURE: 98 F | WEIGHT: 83.81 LBS | SYSTOLIC BLOOD PRESSURE: 108 MMHG | OXYGEN SATURATION: 95 % | HEART RATE: 104 BPM | DIASTOLIC BLOOD PRESSURE: 64 MMHG | BODY MASS INDEX: 13.97 KG/M2 | RESPIRATION RATE: 15 BRPM | HEIGHT: 65 IN

## 2025-02-13 DIAGNOSIS — R05.3 CHRONIC COUGH: ICD-10-CM

## 2025-02-13 DIAGNOSIS — J44.9 CHRONIC OBSTRUCTIVE PULMONARY DISEASE, UNSPECIFIED COPD TYPE: Primary | ICD-10-CM

## 2025-02-13 DIAGNOSIS — R05.9 COUGH, UNSPECIFIED TYPE: ICD-10-CM

## 2025-02-13 DIAGNOSIS — M54.2 NECK PAIN: ICD-10-CM

## 2025-02-13 DIAGNOSIS — R53.81 MALAISE: ICD-10-CM

## 2025-02-13 LAB
CTP QC/QA: YES
CTP QC/QA: YES
POC MOLECULAR INFLUENZA A AGN: NEGATIVE
POC MOLECULAR INFLUENZA B AGN: NEGATIVE
SARS-COV-2 RDRP RESP QL NAA+PROBE: NEGATIVE

## 2025-02-13 RX ORDER — BENZONATATE 100 MG/1
100 CAPSULE ORAL 3 TIMES DAILY PRN
Qty: 90 CAPSULE | Refills: 1 | Status: SHIPPED | OUTPATIENT
Start: 2025-02-13

## 2025-02-13 RX ORDER — AZITHROMYCIN 250 MG/1
TABLET, FILM COATED ORAL
Qty: 6 TABLET | Refills: 0 | Status: SHIPPED | OUTPATIENT
Start: 2025-02-13

## 2025-02-13 RX ORDER — METHYLPREDNISOLONE 4 MG/1
TABLET ORAL
Qty: 21 EACH | Refills: 0 | Status: SHIPPED | OUTPATIENT
Start: 2025-02-13

## 2025-02-13 RX ORDER — TIZANIDINE 4 MG/1
4 TABLET ORAL EVERY 6 HOURS PRN
Qty: 30 TABLET | Refills: 2 | Status: SHIPPED | OUTPATIENT
Start: 2025-02-13

## 2025-02-13 NOTE — LETTER
February 13, 2025    Neyda Claire  [address] [place] [address]             [facility]  Family Medicine  [address] [place] [address]  Phone: [phone]  Fax: [phone]   February 13, 2025     Patient: Neyda Claire   YOB: 1960   Date of Visit: 2/13/2025       To Whom it May Concern:    Neyda Claire was seen in my clinic on 2/13/2025. She may return to work on 2/14/2025 .    Please excuse her from any classes or work missed from 2/11/2025 until 2/14/2025.    If you have any questions or concerns, please don't hesitate to call.    Sincerely,         Samara Solano, SHERIFP

## 2025-02-13 NOTE — PROGRESS NOTES
[facility]    Chief Complaint      Chief Complaint   Patient presents with    Cough     Dark colored mucus , with rib pain , weakness and no appetite        History of Present Illness      Neyda Claire is a 64 y.o. female. She  has a past medical history of Bipolar disorder, unspecified, Cancer, COPD (chronic obstructive pulmonary disease), and Mental disorder., who presents today for continued complaints of cough.  Patient does have COPD and is followed by Dr. Anders.     Past Medical History:  Past Medical History:   Diagnosis Date    Bipolar disorder, unspecified     Cancer     basal cell carcinoma top of head, cervical ca at age 16    COPD (chronic obstructive pulmonary disease)     Mental disorder        Past Surgical History:   has a past surgical history that includes Hysterectomy; Tubal ligation; Laparoscopy (2023); repair, hernia, inguinal (2023); excision, small intestine (N/A, 2023); Appendectomy (2023); Cholangiogram (N/A, 2023); Cholecystectomy (2023); Lysis of adhesions (2023); and Oophorectomy.    Social History:  Social History     Tobacco Use    Smoking status: Former     Current packs/day: 0.00     Average packs/day: 1 pack/day for 44.6 years (44.6 ttl pk-yrs)     Types: Cigarettes     Start date:      Quit date: 2019     Years since quittin.5     Passive exposure: Past    Smokeless tobacco: Never   Substance Use Topics    Alcohol use: Not Currently    Drug use: Never       I personally reviewed all past medical, surgical, and social.     Review of Systems   Constitutional:  Positive for chills and fever.   HENT:  Positive for ear pain and sore throat.    Respiratory:  Positive for cough, shortness of breath and wheezing.    Cardiovascular:  Positive for chest pain.   Musculoskeletal:  Positive for myalgias.   Allergic/Immunologic: Negative for environmental allergies.   Neurological:  Positive for headaches.         Medications:  Outpatient Encounter Medications as of 2/13/2025   Medication Sig Dispense Refill    albuterol (PROVENTIL) 2.5 mg /3 mL (0.083 %) nebulizer solution Take 3 mLs (2.5 mg total) by nebulization every 6 (six) hours as needed for Wheezing. 90 mL 1    albuterol (VENTOLIN HFA) 90 mcg/actuation inhaler Inhale 2 puffs into the lungs every 4 (four) hours as needed for Wheezing or Shortness of Breath. Rescue 18 g 11    meloxicam (MOBIC) 15 MG tablet Take 1 tablet (15 mg total) by mouth once daily. 30 tablet 1    montelukast (SINGULAIR) 10 mg tablet Take 1 tablet (10 mg total) by mouth every evening. 90 tablet 1    pantoprazole (PROTONIX) 40 MG tablet Take 1 tablet (40 mg total) by mouth once daily. 90 tablet 0    QUEtiapine (SEROQUEL) 25 MG Tab Take 1 tablet (25 mg total) by mouth every evening. 90 tablet 0    traZODone (DESYREL) 100 MG tablet Take 1 tablet (100 mg total) by mouth every evening. 90 tablet 0    [DISCONTINUED] azithromycin (Z-SILVIO) 250 MG tablet Take 2 tablets by mouth on day 1; Take 1 tablet by mouth on days 2-5 6 tablet 0    [DISCONTINUED] benzonatate (TESSALON) 100 MG capsule Take 1 capsule (100 mg total) by mouth 3 (three) times daily as needed for Cough. 90 capsule 1    [DISCONTINUED] clonazePAM (KLONOPIN) 1 MG tablet Take 1 mg by mouth.      [DISCONTINUED] ED A-HIST 4-10 mg per tablet Take 1 tablet by mouth 2 (two) times daily.      [DISCONTINUED] fluticasone-umeclidin-vilanter (TRELEGY ELLIPTA) 200-62.5-25 mcg inhaler Inhale 1 puff into the lungs once daily. 60 each 11    [DISCONTINUED] methylPREDNISolone (MEDROL DOSEPACK) 4 mg tablet use as directed 21 each 0    [DISCONTINUED] tiZANidine (ZANAFLEX) 4 MG tablet Take 1 tablet (4 mg total) by mouth every 6 (six) hours as needed (Neck pain). 30 tablet 2    azithromycin (Z-SILVIO) 250 MG tablet Take 2 tablets by mouth on day 1; Take 1 tablet by mouth on days 2-5 6 tablet 0    benzonatate (TESSALON) 100 MG capsule Take 1 capsule (100 mg total) by  "mouth 3 (three) times daily as needed for Cough. 90 capsule 1    methylPREDNISolone (MEDROL DOSEPACK) 4 mg tablet use as directed 21 each 0    tiZANidine (ZANAFLEX) 4 MG tablet Take 1 tablet (4 mg total) by mouth every 6 (six) hours as needed (Neck pain). 30 tablet 2     No facility-administered encounter medications on file as of 2/13/2025.       Allergies:  Review of patient's allergies indicates:   Allergen Reactions    Cyclobenzaprine Anxiety, Itching, Palpitations and Shortness Of Breath    Sulfa (sulfonamide antibiotics)        Health Maintenance:  Immunization History   Administered Date(s) Administered    COVID-19, MRNA, LN-S, PF (MODERNA FULL 0.5 ML DOSE) 03/12/2021, 04/09/2021    Pneumococcal Conjugate - 20 Valent 11/18/2024    Tdap 06/17/2019      Health Maintenance   Topic Date Due    Shingles Vaccine (1 of 2) Never done    RSV Vaccine (Age 60+ and Pregnant patients) (1 - Risk 60-74 years 1-dose series) Never done    Influenza Vaccine (1) Never done    COVID-19 Vaccine (3 - 2024-25 season) 09/01/2024    Mammogram  05/07/2025    LDCT Lung Screen  01/23/2026    Colorectal Cancer Screening  11/27/2027    Lipid Panel  02/22/2028    TETANUS VACCINE  06/17/2029    Hepatitis C Screening  Completed    HIV Screening  Completed    Pneumococcal Vaccines (Age 50+)  Completed        Physical Exam      Vital Signs  Temp: 98.3 °F (36.8 °C)  Temp Source: Oral  Pulse: 104  Resp: 15  SpO2: 95 %  BP: 108/64  BP Location: Right arm  Patient Position: Sitting  Pain Score:   9  Pain Loc: Rib Cage  Height and Weight  Height: 5' 5" (165.1 cm)  Weight: 38 kg (83 lb 12.8 oz)  BSA (Calculated - sq m): 1.32 sq meters  BMI (Calculated): 13.9  Weight in (lb) to have BMI = 25: 149.9]    Physical Exam  Vitals and nursing note reviewed.   Constitutional:       General: She is not in acute distress.     Appearance: Normal appearance. She is well-developed.   HENT:      Head: Normocephalic.      Right Ear: Hearing normal.      Left Ear: " Hearing normal.      Nose: Congestion present.   Eyes:      General: Lids are normal.      Conjunctiva/sclera: Conjunctivae normal.   Cardiovascular:      Rate and Rhythm: Normal rate and regular rhythm.      Heart sounds: Normal heart sounds.   Pulmonary:      Effort: Pulmonary effort is normal.      Breath sounds: Normal breath sounds.   Musculoskeletal:         General: Normal range of motion.      Cervical back: Normal range of motion and neck supple.   Skin:     General: Skin is warm and dry.   Neurological:      Mental Status: She is alert and oriented to person, place, and time.      Gait: Gait is intact.   Psychiatric:         Behavior: Behavior is cooperative.          Laboratory:  CBC:  Recent Labs   Lab 12/19/23  0258 12/20/23  0413 12/05/24  1652   WBC 10.87 10.95 12.11 H   RBC 4.33 4.30 3.97 L   Hemoglobin 13.4 13.3 12.1   Hematocrit 39.9 38.6 39.0   Platelet Count 256 294 408 H   MCV 92.1 89.8 98.2 H   MCH 30.9 30.9 30.5   MCHC 33.6 34.5 31.0 L     CMP:  Recent Labs   Lab 07/05/23  1626 12/12/23  1330 12/14/23  0315 12/05/24  1652   Glucose 81 102   < > 175 H   Calcium 9.5 8.9   < > 8.7   Albumin 4.3 3.7  --  3.7   Total Protein 7.5 7.3  --  6.6   Sodium 138 135 L   < > 139   Potassium 4.8 3.5   < > 3.7   CO2 31 23   < > 26   Carbon Dioxide  --   --    < >  --    Chloride 104 103   < > 106   BUN 10 26 H   < > 16   Blood Urea Nitrogen  --   --    < >  --    Alk Phos 65 66  --  79   ALT 29 23  --  26   AST 24 21  --  35 H   Bilirubin, Total 0.3 0.6  --  0.3    < > = values in this interval not displayed.     LIPIDS:  Recent Labs   Lab 03/29/22  0919 02/22/23  1730   TSH  --  0.551   HDL Cholesterol 71 H 81 H   Cholesterol 174 189   Triglycerides 60 63   LDL Calculated 91 95   Cholesterol/HDL Ratio (Risk Factor) 2.5 2.3   Non- 108     TSH:  Recent Labs   Lab 02/22/23  1730   TSH 0.551     A1C:  Recent Labs   Lab 02/22/23  1730 07/05/23  1626   Hemoglobin A1C 5.6 5.5       Assessment/Plan     Neyda  YVETTE Claire is a 64 y.o.female with:     1. Chronic obstructive pulmonary disease, unspecified COPD type  -     methylPREDNISolone (MEDROL DOSEPACK) 4 mg tablet; use as directed  Dispense: 21 each; Refill: 0  -     azithromycin (Z-SILVIO) 250 MG tablet; Take 2 tablets by mouth on day 1; Take 1 tablet by mouth on days 2-5  Dispense: 6 tablet; Refill: 0    2. Cough, unspecified type  -     POCT COVID-19 Rapid Screening    3. Malaise  -     POCT Influenza A/B Molecular    4. Neck pain  -     tiZANidine (ZANAFLEX) 4 MG tablet; Take 1 tablet (4 mg total) by mouth every 6 (six) hours as needed (Neck pain).  Dispense: 30 tablet; Refill: 2    5. Chronic cough  -     benzonatate (TESSALON) 100 MG capsule; Take 1 capsule (100 mg total) by mouth 3 (three) times daily as needed for Cough.  Dispense: 90 capsule; Refill: 1         Total time spent face-to-face and non-face-to-face coordinating care for this encounter was: 30 minutes min    Chronic conditions status updated as per HPI.  Other than changes above, cont current medications and maintain follow up with specialists.  Return to clinic prn if symptoms worsen or fail to improve.    Samara Solano, [facility]  Answers submitted by the patient for this visit:  Cough Questionnaire (Submitted on 2/13/2025)  Chief Complaint: Cough  Chronicity: chronic  Onset: in the past 7 days  Progression since onset: rapidly worsening  Frequency: constantly  Cough characteristics: productive of purulent sputum  ear congestion: Yes  nasal congestion: Yes  sweats: Yes  weight loss: Yes  Aggravated by: lying down  Risk factors for lung disease: occupational exposure, travel  asthma: No  bronchiectasis: No  bronchitis: Yes  COPD: Yes  emphysema: Yes  pneumonia: Yes  Treatments tried: OTC cough suppressant, a beta-agonist inhaler, body position changes, rest  Improvement on treatment: no relief

## 2025-02-25 ENCOUNTER — PATIENT MESSAGE (OUTPATIENT)
Dept: FAMILY MEDICINE | Facility: CLINIC | Age: 65
End: 2025-02-25
Payer: COMMERCIAL

## 2025-03-07 ENCOUNTER — RESULTS FOLLOW-UP (OUTPATIENT)
Dept: FAMILY MEDICINE | Facility: CLINIC | Age: 65
End: 2025-03-07
Payer: COMMERCIAL

## 2025-03-07 ENCOUNTER — OFFICE VISIT (OUTPATIENT)
Dept: FAMILY MEDICINE | Facility: CLINIC | Age: 65
End: 2025-03-07
Payer: COMMERCIAL

## 2025-03-07 ENCOUNTER — PATIENT MESSAGE (OUTPATIENT)
Dept: FAMILY MEDICINE | Facility: CLINIC | Age: 65
End: 2025-03-07
Payer: COMMERCIAL

## 2025-03-07 VITALS
DIASTOLIC BLOOD PRESSURE: 76 MMHG | OXYGEN SATURATION: 95 % | HEART RATE: 97 BPM | RESPIRATION RATE: 17 BRPM | HEIGHT: 65 IN | TEMPERATURE: 98 F | BODY MASS INDEX: 14.13 KG/M2 | WEIGHT: 84.81 LBS | SYSTOLIC BLOOD PRESSURE: 107 MMHG

## 2025-03-07 DIAGNOSIS — J20.9 ACUTE BRONCHITIS, UNSPECIFIED ORGANISM: ICD-10-CM

## 2025-03-07 DIAGNOSIS — F31.9 BIPOLAR DEPRESSION: ICD-10-CM

## 2025-03-07 DIAGNOSIS — R05.9 COUGH, UNSPECIFIED TYPE: Primary | ICD-10-CM

## 2025-03-07 DIAGNOSIS — R50.9 FEVER, UNSPECIFIED FEVER CAUSE: ICD-10-CM

## 2025-03-07 DIAGNOSIS — R05.3 CHRONIC COUGH: ICD-10-CM

## 2025-03-07 DIAGNOSIS — R05.9 COUGH, UNSPECIFIED TYPE: ICD-10-CM

## 2025-03-07 DIAGNOSIS — J44.9 CHRONIC OBSTRUCTIVE PULMONARY DISEASE, UNSPECIFIED COPD TYPE: ICD-10-CM

## 2025-03-07 DIAGNOSIS — G47.00 INSOMNIA, UNSPECIFIED TYPE: Chronic | ICD-10-CM

## 2025-03-07 DIAGNOSIS — M54.2 NECK PAIN: ICD-10-CM

## 2025-03-07 DIAGNOSIS — K21.9 GERD WITHOUT ESOPHAGITIS: ICD-10-CM

## 2025-03-07 LAB
CTP QC/QA: YES
MOLECULAR STREP A: NEGATIVE
POC MOLECULAR INFLUENZA A AGN: NEGATIVE
POC MOLECULAR INFLUENZA B AGN: NEGATIVE
SARS-COV-2 RDRP RESP QL NAA+PROBE: NEGATIVE

## 2025-03-07 PROCEDURE — 1159F MED LIST DOCD IN RCRD: CPT | Mod: ,,, | Performed by: NURSE PRACTITIONER

## 2025-03-07 PROCEDURE — 3074F SYST BP LT 130 MM HG: CPT | Mod: ,,, | Performed by: NURSE PRACTITIONER

## 2025-03-07 PROCEDURE — 1160F RVW MEDS BY RX/DR IN RCRD: CPT | Mod: ,,, | Performed by: NURSE PRACTITIONER

## 2025-03-07 PROCEDURE — 87651 STREP A DNA AMP PROBE: CPT | Mod: QW,,, | Performed by: NURSE PRACTITIONER

## 2025-03-07 PROCEDURE — 96372 THER/PROPH/DIAG INJ SC/IM: CPT | Mod: ,,, | Performed by: NURSE PRACTITIONER

## 2025-03-07 PROCEDURE — 3078F DIAST BP <80 MM HG: CPT | Mod: ,,, | Performed by: NURSE PRACTITIONER

## 2025-03-07 PROCEDURE — 3008F BODY MASS INDEX DOCD: CPT | Mod: ,,, | Performed by: NURSE PRACTITIONER

## 2025-03-07 PROCEDURE — 87635 SARS-COV-2 COVID-19 AMP PRB: CPT | Mod: QW,,, | Performed by: NURSE PRACTITIONER

## 2025-03-07 PROCEDURE — 87502 INFLUENZA DNA AMP PROBE: CPT | Mod: QW,,, | Performed by: NURSE PRACTITIONER

## 2025-03-07 PROCEDURE — 99214 OFFICE O/P EST MOD 30 MIN: CPT | Mod: 25,,, | Performed by: NURSE PRACTITIONER

## 2025-03-07 RX ORDER — TIZANIDINE 4 MG/1
4 TABLET ORAL EVERY 6 HOURS PRN
Qty: 30 TABLET | Refills: 2 | Status: SHIPPED | OUTPATIENT
Start: 2025-03-07

## 2025-03-07 RX ORDER — BENZONATATE 100 MG/1
100 CAPSULE ORAL 3 TIMES DAILY PRN
Qty: 90 CAPSULE | Refills: 1 | Status: SHIPPED | OUTPATIENT
Start: 2025-03-07

## 2025-03-07 RX ORDER — TRAZODONE HYDROCHLORIDE 100 MG/1
100 TABLET ORAL NIGHTLY
Qty: 90 TABLET | Refills: 0 | Status: SHIPPED | OUTPATIENT
Start: 2025-03-07

## 2025-03-07 RX ORDER — ALBUTEROL SULFATE 90 UG/1
2 INHALANT RESPIRATORY (INHALATION) EVERY 4 HOURS PRN
Qty: 18 G | Refills: 11 | Status: SHIPPED | OUTPATIENT
Start: 2025-03-07

## 2025-03-07 RX ORDER — MONTELUKAST SODIUM 10 MG/1
10 TABLET ORAL NIGHTLY
Qty: 90 TABLET | Refills: 1 | Status: SHIPPED | OUTPATIENT
Start: 2025-03-07

## 2025-03-07 RX ORDER — QUETIAPINE FUMARATE 25 MG/1
25 TABLET, FILM COATED ORAL NIGHTLY
Qty: 90 TABLET | Refills: 0 | Status: SHIPPED | OUTPATIENT
Start: 2025-03-07

## 2025-03-07 RX ORDER — ALBUTEROL SULFATE 0.83 MG/ML
2.5 SOLUTION RESPIRATORY (INHALATION) EVERY 6 HOURS PRN
Qty: 90 ML | Refills: 1 | Status: SHIPPED | OUTPATIENT
Start: 2025-03-07 | End: 2026-03-07

## 2025-03-07 RX ORDER — MELOXICAM 15 MG/1
15 TABLET ORAL DAILY
Qty: 30 TABLET | Refills: 1 | Status: SHIPPED | OUTPATIENT
Start: 2025-03-07

## 2025-03-07 RX ORDER — PANTOPRAZOLE SODIUM 40 MG/1
40 TABLET, DELAYED RELEASE ORAL DAILY
Qty: 90 TABLET | Refills: 0 | Status: SHIPPED | OUTPATIENT
Start: 2025-03-07 | End: 2025-06-05

## 2025-03-07 RX ORDER — PROMETHAZINE HYDROCHLORIDE AND DEXTROMETHORPHAN HYDROBROMIDE 6.25; 15 MG/5ML; MG/5ML
5 SYRUP ORAL EVERY 4 HOURS PRN
Qty: 118 ML | Refills: 0 | OUTPATIENT
Start: 2025-03-07

## 2025-03-07 RX ORDER — DEXAMETHASONE SODIUM PHOSPHATE 4 MG/ML
4 INJECTION, SOLUTION INTRA-ARTICULAR; INTRALESIONAL; INTRAMUSCULAR; INTRAVENOUS; SOFT TISSUE
Status: COMPLETED | OUTPATIENT
Start: 2025-03-07 | End: 2025-03-07

## 2025-03-07 RX ORDER — METHYLPREDNISOLONE ACETATE 40 MG/ML
40 INJECTION, SUSPENSION INTRA-ARTICULAR; INTRALESIONAL; INTRAMUSCULAR; SOFT TISSUE ONCE
Status: COMPLETED | OUTPATIENT
Start: 2025-03-07 | End: 2025-03-07

## 2025-03-07 RX ADMIN — METHYLPREDNISOLONE ACETATE 40 MG: 40 INJECTION, SUSPENSION INTRA-ARTICULAR; INTRALESIONAL; INTRAMUSCULAR; SOFT TISSUE at 12:03

## 2025-03-07 RX ADMIN — DEXAMETHASONE SODIUM PHOSPHATE 4 MG: 4 INJECTION, SOLUTION INTRA-ARTICULAR; INTRALESIONAL; INTRAMUSCULAR; INTRAVENOUS; SOFT TISSUE at 12:03

## 2025-03-07 NOTE — PROGRESS NOTES
[facility]    Chief Complaint      Chief Complaint   Patient presents with    Cough    Shortness of Breath       History of Present Illness      Neyda Claire is a 64 y.o. female. She  has a past medical history of Bipolar disorder, unspecified, Cancer, COPD (chronic obstructive pulmonary disease), and Mental disorder., who presents today for c/o cough and SOB- this is chronic.  Patient is followed by Pulmonology for her COPD.     Past Medical History:  Past Medical History:   Diagnosis Date    Bipolar disorder, unspecified     Cancer     basal cell carcinoma top of head, cervical ca at age 16    COPD (chronic obstructive pulmonary disease)     Mental disorder        Past Surgical History:   has a past surgical history that includes Hysterectomy; Tubal ligation; Laparoscopy (12/13/2023); repair, hernia, inguinal (12/13/2023); excision, small intestine (N/A, 12/13/2023); Appendectomy (12/13/2023); Cholangiogram (N/A, 12/13/2023); Cholecystectomy (12/13/2023); Lysis of adhesions (12/13/2023); and Oophorectomy.    Social History:  Social History[1]    I personally reviewed all past medical, surgical, and social.     Review of Systems   Constitutional:  Positive for fever. Negative for chills and fatigue.   HENT:  Positive for ear pain, rhinorrhea and sore throat. Negative for ear discharge.    Respiratory:  Positive for cough.    Gastrointestinal:  Negative for diarrhea, nausea and vomiting.   Musculoskeletal:  Positive for neck pain.   Neurological:  Positive for headaches.        Medications:  Encounter Medications[2]    Allergies:  Review of patient's allergies indicates:   Allergen Reactions    Cyclobenzaprine Anxiety, Itching, Palpitations and Shortness Of Breath    Sulfa (sulfonamide antibiotics)        Health Maintenance:  Immunization History   Administered Date(s) Administered    COVID-19, MRNA, LN-S, PF (MODERNA FULL 0.5 ML DOSE) 03/12/2021, 04/09/2021    Pneumococcal Conjugate - 20 Valent  "11/18/2024    Tdap 06/17/2019      Health Maintenance   Topic Date Due    Shingles Vaccine (1 of 2) Never done    RSV Vaccine (Age 60+ and Pregnant patients) (1 - Risk 60-74 years 1-dose series) Never done    Influenza Vaccine (1) Never done    COVID-19 Vaccine (3 - 2024-25 season) 09/01/2024    Mammogram  05/07/2025    LDCT Lung Screen  01/23/2026    Colorectal Cancer Screening  11/27/2027    Lipid Panel  02/22/2028    TETANUS VACCINE  06/17/2029    Hepatitis C Screening  Completed    HIV Screening  Completed    Pneumococcal Vaccines (Age 50+)  Completed        Physical Exam      Vital Signs  Temp: 98.4 °F (36.9 °C)  Temp Source: Oral  Pulse: 97  Resp: 17  SpO2: 95 %  BP: 107/76  Pain Score: 0-No pain  Height and Weight  Height: 5' 5" (165.1 cm)  Weight: 38.5 kg (84 lb 12.8 oz)  BSA (Calculated - sq m): 1.33 sq meters  BMI (Calculated): 14.1  Weight in (lb) to have BMI = 25: 149.9]    Physical Exam  Vitals and nursing note reviewed.   Constitutional:       General: She is not in acute distress.     Appearance: She is well-developed and underweight. She is ill-appearing.   HENT:      Head: Normocephalic.      Right Ear: Hearing normal.      Left Ear: Hearing normal.      Nose: Nose normal.   Eyes:      General: Lids are normal.      Conjunctiva/sclera: Conjunctivae normal.   Cardiovascular:      Rate and Rhythm: Normal rate and regular rhythm.      Heart sounds: Normal heart sounds.   Pulmonary:      Effort: Pulmonary effort is normal.      Breath sounds: Rhonchi present.   Musculoskeletal:         General: Normal range of motion.      Cervical back: Normal range of motion and neck supple.      Right lower leg: No edema.      Left lower leg: No edema.   Skin:     General: Skin is warm and dry.   Neurological:      Mental Status: She is alert and oriented to person, place, and time.      Gait: Gait is intact.   Psychiatric:         Behavior: Behavior is cooperative.          Laboratory:  CBC:  Recent Labs   Lab " 12/19/23  0258 12/20/23  0413 12/05/24  1652   WBC 10.87 10.95 12.11 H   RBC 4.33 4.30 3.97 L   Hemoglobin 13.4 13.3 12.1   Hematocrit 39.9 38.6 39.0   Platelet Count 256 294 408 H   MCV 92.1 89.8 98.2 H   MCH 30.9 30.9 30.5   MCHC 33.6 34.5 31.0 L     CMP:  Recent Labs   Lab 07/05/23  1626 12/12/23  1330 12/14/23  0315 12/05/24  1652   Glucose 81 102   < > 175 H   Calcium 9.5 8.9   < > 8.7   Albumin 4.3 3.7  --  3.7   Total Protein 7.5 7.3  --  6.6   Sodium 138 135 L   < > 139   Potassium 4.8 3.5   < > 3.7   CO2 31 23   < > 26   Carbon Dioxide  --   --    < >  --    Chloride 104 103   < > 106   BUN 10 26 H   < > 16   Blood Urea Nitrogen  --   --    < >  --    Alk Phos 65 66  --  79   ALT 29 23  --  26   AST 24 21  --  35 H   Bilirubin, Total 0.3 0.6  --  0.3    < > = values in this interval not displayed.     LIPIDS:  Recent Labs   Lab 03/29/22  0919 02/22/23  1730   TSH  --  0.551   HDL Cholesterol 71 H 81 H   Cholesterol 174 189   Triglycerides 60 63   LDL Calculated 91 95   Cholesterol/HDL Ratio (Risk Factor) 2.5 2.3   Non- 108     TSH:  Recent Labs   Lab 02/22/23  1730   TSH 0.551     A1C:  Recent Labs   Lab 02/22/23  1730 07/05/23  1626   Hemoglobin A1C 5.6 5.5       Assessment/Plan     Neyda Claire is a 64 y.o.female with:     1. Cough, unspecified type  -     POCT Influenza A/B Molecular  -     POCT COVID-19 Rapid Screening  -     POCT Strep A, Molecular  -     dexAMETHasone injection 4 mg  -     methylPREDNISolone acetate injection 40 mg  -     X-Ray Chest PA And Lateral; Future; Expected date: 03/07/2025    2. Fever, unspecified fever cause  -     POCT Influenza A/B Molecular  -     POCT COVID-19 Rapid Screening  -     POCT Strep A, Molecular  -     X-Ray Chest PA And Lateral; Future; Expected date: 03/07/2025    3. Chronic obstructive pulmonary disease, unspecified COPD type  -     albuterol (PROVENTIL) 2.5 mg /3 mL (0.083 %) nebulizer solution; Take 3 mLs (2.5 mg total) by nebulization every 6  (six) hours as needed for Wheezing.  Dispense: 90 mL; Refill: 1  -     montelukast (SINGULAIR) 10 mg tablet; Take 1 tablet (10 mg total) by mouth every evening.  Dispense: 90 tablet; Refill: 1    4. Acute bronchitis, unspecified organism  -     albuterol (VENTOLIN HFA) 90 mcg/actuation inhaler; Inhale 2 puffs into the lungs every 4 (four) hours as needed for Wheezing or Shortness of Breath. Rescue  Dispense: 18 g; Refill: 11    5. Chronic cough  -     benzonatate (TESSALON) 100 MG capsule; Take 1 capsule (100 mg total) by mouth 3 (three) times daily as needed for Cough.  Dispense: 90 capsule; Refill: 1    6. Neck pain  -     meloxicam (MOBIC) 15 MG tablet; Take 1 tablet (15 mg total) by mouth once daily.  Dispense: 30 tablet; Refill: 1  -     tiZANidine (ZANAFLEX) 4 MG tablet; Take 1 tablet (4 mg total) by mouth every 6 (six) hours as needed (Neck pain).  Dispense: 30 tablet; Refill: 2    7. GERD without esophagitis  -     pantoprazole (PROTONIX) 40 MG tablet; Take 1 tablet (40 mg total) by mouth once daily.  Dispense: 90 tablet; Refill: 0    8. Bipolar depression  -     QUEtiapine (SEROQUEL) 25 MG Tab; Take 1 tablet (25 mg total) by mouth every evening.  Dispense: 90 tablet; Refill: 0    9. Insomnia, unspecified type  -     traZODone (DESYREL) 100 MG tablet; Take 1 tablet (100 mg total) by mouth every evening.  Dispense: 90 tablet; Refill: 0         Total time spent face-to-face and non-face-to-face coordinating care for this encounter was: 30 minutes     Chronic conditions status updated as per HPI.  Other than changes above, cont current medications and maintain follow up with specialists.  Return to clinic prn if symptoms worsen or fail to improve.    Samara Solano, CARINA  [facility]  Answers submitted by the patient for this visit:  Ear Pain Questionnaire (Submitted on 3/6/2025)  Chief Complaint: Ear pain  Affected ear: right  Chronicity: new  Onset: in the past 7 days  Progression since onset: waxing  and waning  Frequency: constantly  Fever: 102 - 102.9 F  Fever duration: 1 to 2 days  Pain - numeric: 8/10  Treatments tried: acetaminophen, heat packs  Improvement on treatment: mild  Pain severity: moderate  chronic ear infection: No  hearing loss: No  tympanostomy tube: No         [1]   Social History  Tobacco Use    Smoking status: Former     Current packs/day: 0.00     Average packs/day: 1 pack/day for 44.6 years (44.6 ttl pk-yrs)     Types: Cigarettes     Start date:      Quit date: 2019     Years since quittin.6     Passive exposure: Past    Smokeless tobacco: Never   Substance Use Topics    Alcohol use: Not Currently    Drug use: Never   [2]   Outpatient Encounter Medications as of 3/7/2025   Medication Sig Dispense Refill    azithromycin (Z-SILVIO) 250 MG tablet Take 2 tablets by mouth on day 1; Take 1 tablet by mouth on days 2-5 6 tablet 0    methylPREDNISolone (MEDROL DOSEPACK) 4 mg tablet use as directed 21 each 0    [DISCONTINUED] albuterol (PROVENTIL) 2.5 mg /3 mL (0.083 %) nebulizer solution Take 3 mLs (2.5 mg total) by nebulization every 6 (six) hours as needed for Wheezing. 90 mL 1    [DISCONTINUED] albuterol (VENTOLIN HFA) 90 mcg/actuation inhaler Inhale 2 puffs into the lungs every 4 (four) hours as needed for Wheezing or Shortness of Breath. Rescue 18 g 11    [DISCONTINUED] benzonatate (TESSALON) 100 MG capsule Take 1 capsule (100 mg total) by mouth 3 (three) times daily as needed for Cough. 90 capsule 1    [DISCONTINUED] meloxicam (MOBIC) 15 MG tablet Take 1 tablet (15 mg total) by mouth once daily. 30 tablet 1    [DISCONTINUED] montelukast (SINGULAIR) 10 mg tablet Take 1 tablet (10 mg total) by mouth every evening. 90 tablet 1    [DISCONTINUED] pantoprazole (PROTONIX) 40 MG tablet Take 1 tablet (40 mg total) by mouth once daily. 90 tablet 0    [DISCONTINUED] QUEtiapine (SEROQUEL) 25 MG Tab Take 1 tablet (25 mg total) by mouth every evening. 90 tablet 0    [DISCONTINUED] tiZANidine  (ZANAFLEX) 4 MG tablet Take 1 tablet (4 mg total) by mouth every 6 (six) hours as needed (Neck pain). 30 tablet 2    [DISCONTINUED] traZODone (DESYREL) 100 MG tablet Take 1 tablet (100 mg total) by mouth every evening. 90 tablet 0    albuterol (PROVENTIL) 2.5 mg /3 mL (0.083 %) nebulizer solution Take 3 mLs (2.5 mg total) by nebulization every 6 (six) hours as needed for Wheezing. 90 mL 1    albuterol (VENTOLIN HFA) 90 mcg/actuation inhaler Inhale 2 puffs into the lungs every 4 (four) hours as needed for Wheezing or Shortness of Breath. Rescue 18 g 11    benzonatate (TESSALON) 100 MG capsule Take 1 capsule (100 mg total) by mouth 3 (three) times daily as needed for Cough. 90 capsule 1    meloxicam (MOBIC) 15 MG tablet Take 1 tablet (15 mg total) by mouth once daily. 30 tablet 1    montelukast (SINGULAIR) 10 mg tablet Take 1 tablet (10 mg total) by mouth every evening. 90 tablet 1    pantoprazole (PROTONIX) 40 MG tablet Take 1 tablet (40 mg total) by mouth once daily. 90 tablet 0    QUEtiapine (SEROQUEL) 25 MG Tab Take 1 tablet (25 mg total) by mouth every evening. 90 tablet 0    tiZANidine (ZANAFLEX) 4 MG tablet Take 1 tablet (4 mg total) by mouth every 6 (six) hours as needed (Neck pain). 30 tablet 2    traZODone (DESYREL) 100 MG tablet Take 1 tablet (100 mg total) by mouth every evening. 90 tablet 0    [] dexAMETHasone injection 4 mg       [] methylPREDNISolone acetate injection 40 mg        No facility-administered encounter medications on file as of 3/7/2025.

## 2025-03-07 NOTE — TELEPHONE ENCOUNTER
----- Message from SHEILA Johnson sent at 3/7/2025  1:21 PM CST -----  Chest xray ok  ----- Message -----  From: Interface, Rad Results In  Sent: 3/7/2025  12:22 PM CST  To: SHEILA Hodges

## 2025-03-07 NOTE — TELEPHONE ENCOUNTER
Patient notified that Samara Solano reviewed CXR results and stated CXR was ok.  Patient verbalized understanding.

## 2025-03-14 ENCOUNTER — PATIENT MESSAGE (OUTPATIENT)
Dept: FAMILY MEDICINE | Facility: CLINIC | Age: 65
End: 2025-03-14
Payer: COMMERCIAL

## 2025-05-15 ENCOUNTER — HOSPITAL ENCOUNTER (OUTPATIENT)
Dept: RADIOLOGY | Facility: HOSPITAL | Age: 65
Discharge: HOME | End: 2025-05-15
Attending: FAMILY MEDICINE
Payer: COMMERCIAL

## 2025-05-15 DIAGNOSIS — Z12.31 OTHER SCREENING MAMMOGRAM: ICD-10-CM

## 2025-05-15 PROCEDURE — 77067 SCR MAMMO BI INCL CAD: CPT | Mod: 26,,, | Performed by: RADIOLOGY

## 2025-05-15 PROCEDURE — 77063 BREAST TOMOSYNTHESIS BI: CPT | Mod: 26,,, | Performed by: RADIOLOGY

## 2025-05-15 PROCEDURE — 77063 BREAST TOMOSYNTHESIS BI: CPT | Mod: TC

## 2025-05-29 ENCOUNTER — OFFICE VISIT (OUTPATIENT)
Dept: FAMILY MEDICINE | Facility: CLINIC | Age: 65
End: 2025-05-29
Payer: COMMERCIAL

## 2025-05-29 VITALS
BODY MASS INDEX: 13.69 KG/M2 | HEIGHT: 65 IN | TEMPERATURE: 98 F | HEART RATE: 103 BPM | WEIGHT: 82.19 LBS | SYSTOLIC BLOOD PRESSURE: 132 MMHG | DIASTOLIC BLOOD PRESSURE: 88 MMHG | OXYGEN SATURATION: 96 % | RESPIRATION RATE: 15 BRPM

## 2025-05-29 DIAGNOSIS — R42 DIZZINESS: Primary | ICD-10-CM

## 2025-05-29 DIAGNOSIS — F31.9 BIPOLAR DEPRESSION: ICD-10-CM

## 2025-05-29 DIAGNOSIS — R94.31 ABNORMAL EKG: ICD-10-CM

## 2025-05-29 DIAGNOSIS — R00.0 TACHYCARDIA: ICD-10-CM

## 2025-05-29 DIAGNOSIS — M54.2 NECK PAIN: ICD-10-CM

## 2025-05-29 DIAGNOSIS — Z86.79 H/O HYPOTENSION: ICD-10-CM

## 2025-05-29 LAB
ALBUMIN SERPL BCP-MCNC: 4.4 G/DL (ref 3.4–4.8)
ALBUMIN/GLOB SERPL: 1.4 {RATIO}
ALP SERPL-CCNC: 76 U/L (ref 40–150)
ALT SERPL W P-5'-P-CCNC: 25 U/L
ANION GAP SERPL CALCULATED.3IONS-SCNC: 21 MMOL/L (ref 7–16)
AST SERPL W P-5'-P-CCNC: 46 U/L (ref 11–45)
BASOPHILS # BLD AUTO: 0.07 K/UL (ref 0–0.2)
BASOPHILS NFR BLD AUTO: 0.5 % (ref 0–1)
BILIRUB SERPL-MCNC: 0.3 MG/DL
BUN SERPL-MCNC: 6 MG/DL (ref 10–20)
BUN/CREAT SERPL: 9 (ref 6–20)
CALCIUM SERPL-MCNC: 9.5 MG/DL (ref 8.4–10.2)
CHLORIDE SERPL-SCNC: 105 MMOL/L (ref 98–107)
CO2 SERPL-SCNC: 22 MMOL/L (ref 23–31)
CREAT SERPL-MCNC: 0.65 MG/DL (ref 0.55–1.02)
DIFFERENTIAL METHOD BLD: ABNORMAL
EGFR (NO RACE VARIABLE) (RUSH/TITUS): 98 ML/MIN/1.73M2
EKG 12-LEAD: NORMAL
EOSINOPHIL # BLD AUTO: 0 K/UL (ref 0–0.5)
EOSINOPHIL NFR BLD AUTO: 0 % (ref 1–4)
ERYTHROCYTE [DISTWIDTH] IN BLOOD BY AUTOMATED COUNT: 13.8 % (ref 11.5–14.5)
GLOBULIN SER-MCNC: 3.1 G/DL (ref 2–4)
GLUCOSE SERPL-MCNC: 90 MG/DL (ref 82–115)
HCT VFR BLD AUTO: 45.6 % (ref 38–47)
HGB BLD-MCNC: 14.4 G/DL (ref 12–16)
IMM GRANULOCYTES # BLD AUTO: 0.07 K/UL (ref 0–0.04)
IMM GRANULOCYTES NFR BLD: 0.5 % (ref 0–0.4)
LYMPHOCYTES # BLD AUTO: 2.69 K/UL (ref 1–4.8)
LYMPHOCYTES NFR BLD AUTO: 18.9 % (ref 27–41)
MCH RBC QN AUTO: 31.6 PG (ref 27–31)
MCHC RBC AUTO-ENTMCNC: 31.6 G/DL (ref 32–36)
MCV RBC AUTO: 100 FL (ref 80–96)
MONOCYTES # BLD AUTO: 0.82 K/UL (ref 0–0.8)
MONOCYTES NFR BLD AUTO: 5.7 % (ref 2–6)
MPC BLD CALC-MCNC: 9.5 FL (ref 9.4–12.4)
NEUTROPHILS # BLD AUTO: 10.62 K/UL (ref 1.8–7.7)
NEUTROPHILS NFR BLD AUTO: 74.4 % (ref 53–65)
NRBC # BLD AUTO: 0 X10E3/UL
NRBC, AUTO (.00): 0 %
NT-PROBNP SERPL-MCNC: 128 PG/ML (ref 1–125)
PLATELET # BLD AUTO: 407 K/UL (ref 150–400)
POTASSIUM SERPL-SCNC: 3.1 MMOL/L (ref 3.5–5.1)
PR INTERVAL: 130
PROT SERPL-MCNC: 7.5 G/DL (ref 5.8–7.6)
PRT AXES: NORMAL
QRS DURATION: 76
QT/QTC: NORMAL
RBC # BLD AUTO: 4.56 M/UL (ref 4.2–5.4)
SODIUM SERPL-SCNC: 145 MMOL/L (ref 136–145)
VENTRICULAR RATE: 92
WBC # BLD AUTO: 14.27 K/UL (ref 4.5–11)

## 2025-05-29 PROCEDURE — 3079F DIAST BP 80-89 MM HG: CPT | Mod: ,,, | Performed by: NURSE PRACTITIONER

## 2025-05-29 PROCEDURE — 3075F SYST BP GE 130 - 139MM HG: CPT | Mod: ,,, | Performed by: NURSE PRACTITIONER

## 2025-05-29 PROCEDURE — 3008F BODY MASS INDEX DOCD: CPT | Mod: ,,, | Performed by: NURSE PRACTITIONER

## 2025-05-29 PROCEDURE — 99214 OFFICE O/P EST MOD 30 MIN: CPT | Mod: 25,,, | Performed by: NURSE PRACTITIONER

## 2025-05-29 PROCEDURE — 93000 ELECTROCARDIOGRAM COMPLETE: CPT | Mod: ,,, | Performed by: NURSE PRACTITIONER

## 2025-05-29 PROCEDURE — 80053 COMPREHEN METABOLIC PANEL: CPT | Mod: ,,, | Performed by: CLINICAL MEDICAL LABORATORY

## 2025-05-29 PROCEDURE — 83880 ASSAY OF NATRIURETIC PEPTIDE: CPT | Mod: ,,, | Performed by: CLINICAL MEDICAL LABORATORY

## 2025-05-29 PROCEDURE — 85025 COMPLETE CBC W/AUTO DIFF WBC: CPT | Mod: ,,, | Performed by: CLINICAL MEDICAL LABORATORY

## 2025-05-29 RX ORDER — MELOXICAM 15 MG/1
15 TABLET ORAL
Qty: 30 TABLET | Refills: 0 | Status: SHIPPED | OUTPATIENT
Start: 2025-05-29

## 2025-05-29 RX ORDER — QUETIAPINE FUMARATE 50 MG/1
50 TABLET, FILM COATED ORAL NIGHTLY
Qty: 30 TABLET | Refills: 11 | Status: SHIPPED | OUTPATIENT
Start: 2025-05-29 | End: 2026-05-29

## 2025-05-30 ENCOUNTER — PATIENT MESSAGE (OUTPATIENT)
Dept: FAMILY MEDICINE | Facility: CLINIC | Age: 65
End: 2025-05-30
Payer: COMMERCIAL

## 2025-05-30 DIAGNOSIS — G47.00 INSOMNIA, UNSPECIFIED TYPE: Chronic | ICD-10-CM

## 2025-05-30 DIAGNOSIS — D72.829 LEUKOCYTOSIS, UNSPECIFIED TYPE: Primary | ICD-10-CM

## 2025-05-30 RX ORDER — AMOXICILLIN AND CLAVULANATE POTASSIUM 875; 125 MG/1; MG/1
1 TABLET, FILM COATED ORAL 2 TIMES DAILY
Qty: 14 TABLET | Refills: 0 | Status: SHIPPED | OUTPATIENT
Start: 2025-05-30 | End: 2025-06-06

## 2025-05-30 RX ORDER — TRAZODONE HYDROCHLORIDE 100 MG/1
100 TABLET ORAL NIGHTLY
Qty: 90 TABLET | Refills: 0 | OUTPATIENT
Start: 2025-05-30

## 2025-06-02 ENCOUNTER — OFFICE VISIT (OUTPATIENT)
Dept: CARDIOLOGY | Facility: CLINIC | Age: 65
End: 2025-06-02
Payer: COMMERCIAL

## 2025-06-02 ENCOUNTER — HOSPITAL ENCOUNTER (OUTPATIENT)
Dept: CARDIOLOGY | Facility: HOSPITAL | Age: 65
Discharge: HOME | End: 2025-06-02
Attending: INTERNAL MEDICINE
Payer: COMMERCIAL

## 2025-06-02 VITALS
HEIGHT: 65 IN | SYSTOLIC BLOOD PRESSURE: 120 MMHG | BODY MASS INDEX: 14.49 KG/M2 | DIASTOLIC BLOOD PRESSURE: 100 MMHG | WEIGHT: 87 LBS | HEART RATE: 81 BPM | OXYGEN SATURATION: 97 %

## 2025-06-02 DIAGNOSIS — R00.2 PALPITATIONS: ICD-10-CM

## 2025-06-02 DIAGNOSIS — R94.31 ABNORMAL EKG: Primary | ICD-10-CM

## 2025-06-02 DIAGNOSIS — Z86.79 H/O HYPOTENSION: ICD-10-CM

## 2025-06-02 DIAGNOSIS — R42 DIZZINESS: ICD-10-CM

## 2025-06-02 DIAGNOSIS — F31.9 BIPOLAR DEPRESSION: ICD-10-CM

## 2025-06-02 DIAGNOSIS — R07.9 CHEST PAIN, UNSPECIFIED TYPE: ICD-10-CM

## 2025-06-02 PROCEDURE — 3074F SYST BP LT 130 MM HG: CPT | Mod: ,,, | Performed by: INTERNAL MEDICINE

## 2025-06-02 PROCEDURE — 93010 ELECTROCARDIOGRAM REPORT: CPT | Mod: S$PBB,,, | Performed by: INTERNAL MEDICINE

## 2025-06-02 PROCEDURE — 3008F BODY MASS INDEX DOCD: CPT | Mod: ,,, | Performed by: INTERNAL MEDICINE

## 2025-06-02 PROCEDURE — 99204 OFFICE O/P NEW MOD 45 MIN: CPT | Mod: S$PBB,,, | Performed by: INTERNAL MEDICINE

## 2025-06-02 PROCEDURE — 3080F DIAST BP >= 90 MM HG: CPT | Mod: ,,, | Performed by: INTERNAL MEDICINE

## 2025-06-02 PROCEDURE — 1159F MED LIST DOCD IN RCRD: CPT | Mod: ,,, | Performed by: INTERNAL MEDICINE

## 2025-06-02 PROCEDURE — 93005 ELECTROCARDIOGRAM TRACING: CPT | Mod: PBBFAC | Performed by: INTERNAL MEDICINE

## 2025-06-02 PROCEDURE — 99999 PR PBB SHADOW E&M-EST. PATIENT-LVL V: CPT | Mod: PBBFAC,,, | Performed by: INTERNAL MEDICINE

## 2025-06-02 PROCEDURE — 1160F RVW MEDS BY RX/DR IN RCRD: CPT | Mod: ,,, | Performed by: INTERNAL MEDICINE

## 2025-06-02 PROCEDURE — 99215 OFFICE O/P EST HI 40 MIN: CPT | Mod: PBBFAC,25 | Performed by: INTERNAL MEDICINE

## 2025-06-02 PROCEDURE — 93246 EXT ECG>7D<15D RECORDING: CPT

## 2025-06-02 NOTE — PATIENT INSTRUCTIONS
Jarrett placed at today's visit.   We will call you on tomorrow with the date and time of your stress test and echo.  We will call you with your follow up appointment on tomorrow.

## 2025-06-03 ENCOUNTER — TELEPHONE (OUTPATIENT)
Dept: CARDIOLOGY | Facility: CLINIC | Age: 65
End: 2025-06-03
Payer: COMMERCIAL

## 2025-06-03 DIAGNOSIS — R42 DIZZINESS: Primary | ICD-10-CM

## 2025-06-04 ENCOUNTER — PATIENT MESSAGE (OUTPATIENT)
Dept: FAMILY MEDICINE | Facility: CLINIC | Age: 65
End: 2025-06-04
Payer: COMMERCIAL

## 2025-06-09 ENCOUNTER — OFFICE VISIT (OUTPATIENT)
Dept: PULMONOLOGY | Facility: CLINIC | Age: 65
End: 2025-06-09
Payer: COMMERCIAL

## 2025-06-09 VITALS
DIASTOLIC BLOOD PRESSURE: 72 MMHG | HEIGHT: 65 IN | WEIGHT: 87 LBS | HEART RATE: 101 BPM | BODY MASS INDEX: 14.49 KG/M2 | SYSTOLIC BLOOD PRESSURE: 124 MMHG | OXYGEN SATURATION: 97 %

## 2025-06-09 DIAGNOSIS — F17.210 SMOKING GREATER THAN 40 PACK YEARS: ICD-10-CM

## 2025-06-09 DIAGNOSIS — J43.2 CENTRILOBULAR EMPHYSEMA: Primary | ICD-10-CM

## 2025-06-09 DIAGNOSIS — J44.9 CHRONIC OBSTRUCTIVE PULMONARY DISEASE, UNSPECIFIED COPD TYPE: ICD-10-CM

## 2025-06-09 PROBLEM — Z86.79 H/O HYPOTENSION: Status: ACTIVE | Noted: 2025-06-09

## 2025-06-09 PROBLEM — R00.2 PALPITATIONS: Status: ACTIVE | Noted: 2025-06-09

## 2025-06-09 PROBLEM — R94.31 ABNORMAL EKG: Status: ACTIVE | Noted: 2025-06-09

## 2025-06-09 PROBLEM — R42 DIZZINESS: Status: ACTIVE | Noted: 2025-06-09

## 2025-06-09 PROCEDURE — 1159F MED LIST DOCD IN RCRD: CPT | Mod: ,,, | Performed by: STUDENT IN AN ORGANIZED HEALTH CARE EDUCATION/TRAINING PROGRAM

## 2025-06-09 PROCEDURE — 1160F RVW MEDS BY RX/DR IN RCRD: CPT | Mod: ,,, | Performed by: STUDENT IN AN ORGANIZED HEALTH CARE EDUCATION/TRAINING PROGRAM

## 2025-06-09 PROCEDURE — 3078F DIAST BP <80 MM HG: CPT | Mod: ,,, | Performed by: STUDENT IN AN ORGANIZED HEALTH CARE EDUCATION/TRAINING PROGRAM

## 2025-06-09 PROCEDURE — 3008F BODY MASS INDEX DOCD: CPT | Mod: ,,, | Performed by: STUDENT IN AN ORGANIZED HEALTH CARE EDUCATION/TRAINING PROGRAM

## 2025-06-09 PROCEDURE — 99215 OFFICE O/P EST HI 40 MIN: CPT | Mod: PBBFAC | Performed by: STUDENT IN AN ORGANIZED HEALTH CARE EDUCATION/TRAINING PROGRAM

## 2025-06-09 PROCEDURE — 99999 PR PBB SHADOW E&M-EST. PATIENT-LVL V: CPT | Mod: PBBFAC,,, | Performed by: STUDENT IN AN ORGANIZED HEALTH CARE EDUCATION/TRAINING PROGRAM

## 2025-06-09 PROCEDURE — 99214 OFFICE O/P EST MOD 30 MIN: CPT | Mod: S$PBB,,, | Performed by: STUDENT IN AN ORGANIZED HEALTH CARE EDUCATION/TRAINING PROGRAM

## 2025-06-09 PROCEDURE — 3074F SYST BP LT 130 MM HG: CPT | Mod: ,,, | Performed by: STUDENT IN AN ORGANIZED HEALTH CARE EDUCATION/TRAINING PROGRAM

## 2025-06-09 RX ORDER — AZITHROMYCIN 250 MG/1
250 TABLET, FILM COATED ORAL DAILY
Qty: 30 TABLET | Refills: 11 | Status: SHIPPED | OUTPATIENT
Start: 2025-06-09

## 2025-06-09 NOTE — PROGRESS NOTES
[facility]  Pulmonology  ESTABLISHED VISIT     Patient Name:  Neyda Claire  Primary Care Provider: Samara Solano FNP  Date of Service: 6/9/2025     Chief Complaint: Cough    SUBJECTIVE   HPI:  Neyda Claire is a 64 y.o. female with COPD (FEV1 1.52) and prior nicotine dependence who presents for follow up of cough. Last seen 12/2024 for routine follow up and plan for LDCT.      Ms. Claire presents with persistent cough, shortness of breath, and fatigue. Ms. Claire reports a chronic cough that has been worsening recently, described as productive with dark green sputum, particularly severe in the mornings. She has had COPD exacerbations, causing her to miss work twice in March (2 weeks) and again recently for 1 week. She reports feeling extremely fatigued and having dyspnea with minimal exertion, such as walking from the parking lot to the office. Ms. Claire notes a faint discomfort in the middle of her chest, which worsens with deep breathing. She also mentions lower leg and calf pain that occurred recently after a severe coughing episode. She reports fever spikes up to 102°F that fluctuate.  She has been using albuterol inhaler and a nebulizer, typically daily, for symptom relief. She tried Trelegy for 2 months but discontinued due to side effects including lightheadedness, dizziness, and nausea. She also takes Mucinex for mucus relief. She was recently prescribed a short course of azithromycin and steroids, which provided some relief but did not fully resolve her symptoms. She had a similar exacerbation in March, for which she received a chest XR, steroids, and antibiotics. Her symptoms seem to worsen with each recurrence. She quit smoking in 2019 but mentions occasional secondhand smoke exposure from her brother's car. She reports blood pressure fluctuations, with both high and low readings. She is currently wearing a heart monitor as part of a workup ordered by her cardiologist, who mentioned concerns  about possible atrial fibrillation. She denies any current fever or GI symptoms.    MEDICATIONS:  Ms. Claire is on Albuterol via inhaler and nebulizer for COPD symptoms. She takes Mucinex as needed for mucus and Boost nutritional drinks for nutrition. She discontinued Trelegy after 2 months due to side effects of lightheadedness, dizziness, and nausea. Ms. Claire also discontinued azithromycin and a short course of steroids prescribed by Dr. Cazares for a previous exacerbation.    SOCIAL HISTORY:  Smoking: Quit in 2019 Occupation: Employed           Initial HPI  Dakotah being a progressive cough over the past few weeks that has raised concern.  She states that she was coughing so much that she has soreness in her ribs and her back.  Her cough is productive of sputum that is opaque but not black.  She has a poor appetite as well and reports some weight loss of the past year.  She reports that she was previously diagnosed with COPD after hospitalization for an acute illness which included a pneumonia with a spot in her lung.  She expresses concern with possible cancer given her smoking history as well as her family history of lung cancer.  She would like to have screening for lung cancer.  She was previously received treatment with Trelegy which improved her breathing symptoms; at this time, she was managed with albuterol which he was on an as-needed basis.  She has had no hospitalizations over the past year for any respiratory complaints.  She had abdominal surgery in 06/2024 for hernia repair with a unremarkable postoperative course.  12/2024: Dakotah reports feeling improved today.  She had low blood pressure during her lung function testing that warranted transferred to the ED.  6MWD testing was deferred for symptomatic hypotension. She has since established with a endocrinology and is planned for a stim test in January.  She is working on increased her caloric intake.  She continues on Trelegy daily and her primary care  team just refilled her albuterol nebulizer which he is using more frequently for shortness of breath.  We discussed the results of her CXR and PFT.        Past Medical History:   Diagnosis Date    Bipolar disorder, unspecified     Cancer     basal cell carcinoma top of head, cervical ca at age 16    COPD (chronic obstructive pulmonary disease)     Mental disorder        Past Surgical History:   Procedure Laterality Date    APPENDECTOMY  12/13/2023    Procedure: APPENDECTOMY;  Surgeon: Hebert Busby DO;  Location: [facility] OR;  Service: General;;    CHOLANGIOGRAM N/A 12/13/2023    Procedure: CHOLANGIOGRAM;  Surgeon: Hebert Busby DO;  Location: [facility] OR;  Service: General;  Laterality: N/A;    CHOLECYSTECTOMY  12/13/2023    Procedure: CHOLECYSTECTOMY;  Surgeon: Hebert Busby DO;  Location: [facility] OR;  Service: General;;    EXCISION, SMALL INTESTINE N/A 12/13/2023    Procedure: SMALL BOWEL RESECTION;  Surgeon: Hebert Busby DO;  Location: [facility];  Service: General;  Laterality: N/A;    HYSTERECTOMY      LAPAROSCOPY  12/13/2023    Procedure: LAPAROSCOPY;  Surgeon: Hebert Busby DO;  Location: [facility] OR;  Service: General;;    LYSIS OF ADHESIONS  12/13/2023    Procedure: LYSIS, ADHESIONS;  Surgeon: Hebert Busby DO;  Location: [facility] OR;  Service: General;;    OOPHORECTOMY      REPAIR, HERNIA, INGUINAL  12/13/2023    Procedure: REPAIR, HERNIA, INGUINAL;  Surgeon: Hebert Busby DO;  Location: [facility];  Service: General;;    TUBAL LIGATION         Family History   Problem Relation Name Age of Onset    Breast cancer Mother      Stroke Mother      Hypertension Mother      No Known Problems Father      Tuberculosis Paternal Grandfather      Liver cancer Brother          Social History     Socioeconomic History    Marital status: Single   Tobacco Use    Smoking status: Former     Current packs/day: 0.00     Average packs/day: 1 pack/day for 44.6 years (44.6 ttl pk-yrs)      Types: Cigarettes     Start date:      Quit date: 2019     Years since quittin.8     Passive exposure: Past    Smokeless tobacco: Never   Substance and Sexual Activity    Alcohol use: Not Currently    Drug use: Never    Sexual activity: Not Currently     Birth control/protection: See Surgical Hx     Social Drivers of Health     Financial Resource Strain: Low Risk  (2025)    Overall Financial Resource Strain (CARDIA)     Difficulty of Paying Living Expenses: Not hard at all   Food Insecurity: No Food Insecurity (2025)    Hunger Vital Sign     Worried About Running Out of Food in the Last Year: Never true     Ran Out of Food in the Last Year: Never true   Transportation Needs: No Transportation Needs (2025)    PRAPARE - Transportation     Lack of Transportation (Medical): No     Lack of Transportation (Non-Medical): No   Physical Activity: Inactive (2025)    Exercise Vital Sign     Days of Exercise per Week: 0 days     Minutes of Exercise per Session: 0 min   Stress: No Stress Concern Present (2025)    [employer] - Occupational Stress Questionnaire     Feeling of Stress : Only a little   Housing Stability: Low Risk  (2025)    Housing Stability Vital Sign     Unable to Pay for Housing in the Last Year: No     Number of Times Moved in the Last Year: 0     Homeless in the Last Year: No       Social History     Social History Narrative    Not on file       Review of patient's allergies indicates:   Allergen Reactions    Cyclobenzaprine Anxiety, Itching, Palpitations and Shortness Of Breath    Sulfa (sulfonamide antibiotics)         Medications: Medications reviewed to include over the counter medications.    Review of Systems: A focused ROS was completed and found to be negative except for that mentioned above.      OBJECTIVE   PHYSICAL EXAM:  Vitals:    25 1347   BP: 124/72   BP Location: Left arm   Patient Position: Sitting   Pulse: 101  "  SpO2: 97%   Weight: 39.5 kg (87 lb)   Height: 5' 5" (1.651 m)     GENERAL: NAD, thin  HEENT: normocephalic, non-icteric conjunctivae, moist oral mucosa  RESPIRATORY: clear to auscultation, no wheezes, rales or rhonchi, on room air, dry cough during encounter  CARDIOVASCULAR: Regular rate and rhythm, no murmurs rubs or gallops  MUSCULOSKELETAL: No clubbing or cyanosis; no pedal edema  NEUROLOGIC: AO ×3, no gross deficits    LABS:  Lab studies reviewed and notable for CO2 26, SCr 0.73, H/H 12.1/39, SEos 50 (12/2024)    IMAGING:  Imaging reviewed and notable for CXR 12/2024 with lung hyperinflation, no pleural effusion, no pneumothorax, no mass, no consolidation, linear scarring in RML apparent in lateral view LDCT 01/2025 bilateral upper lobe predominant centrilobular emphysema, small right lower lobe nodule (0.5 cm), platelike atelectasis, no pleural effusion    LUNG FUNCTION TESTING:   SPIROMETRY/PFT:  12/2024 Pre Post   FVC 3.44/0.60 3.47/0.66   FEV1 1.54/-2.39 1.52/-2.44   FEV1/FVC 45/-3.78 44/-3.86   TLC 7.10/2.89    FRC  5.06/3.26    RV 3.66/3.25    DLCO 6.82/-7.36      6MWD:  Date Distance (ft) Resting SpO2; Yung SpO2 O2 Required   01/2025 1398 96%, RA, 95% None           ASSESSMENT & PLAN     IMPRESSION:  COPD with chronic bronchitis, currently experiencing exacerbation with persistent cough and inflammation.  Recent XR Chest and CT Chest showed no concerning lung findings.  Ongoing cardiac workup with Dr. Jeter, including Holter monitor for potential arrhythmia.    CHRONIC OBSTRUCTIVE PULMONARY DISEASE (COPD):  - Explained COPD as chronic inflammation of airways, with both bronchitis and emphysema components.  - Clarified that COPD exacerbations can be triggered by infections or environmental factors.  - Educated on the difference between maintenance therapy (long-acting) and rescue medications (short-acting).  - Explained the rationale for using azithromycin in COPD management, focusing on its " anti-inflammatory properties rather than antibiotic effects.  - Discussed importance of maintenance therapy for long-term COPD management, even when feeling well.  - Started Breztri: 2 puffs twice daily, every day for maintenance therapy to control airway inflammation and reduce exacerbations.  - Started azithromycin: Daily dosage, cut pill in half, for its anti-inflammatory properties to decrease flare-ups and phlegm production.  - Started low-dose prednisone for appetite, weight loss, and chronic cough management.  - Ordered CT Lungs for January 2026 (12 months from previous scan).    MEDICATION MANAGEMENT:  - Referred to pharmacy financial assistance for Breztri.    FOLLOW-UP:  - Follow up to assess response to new medication regimen.          1. Chronic obstructive pulmonary disease, unspecified COPD type  -     budesonide-glycopyr-formoterol 160-9-4.8 mcg/actuation HFAA; Inhale 2 puffs into the lungs 2 (two) times daily.  Dispense: 10.7 g; Refill: 11  -     Ambulatory referral/consult to Pharmacy Assistance; Future; Expected date: 06/16/2025  -     azithromycin (Z-SILVIO) 250 MG tablet; Take 1 tablet (250 mg total) by mouth once daily.  Dispense: 30 tablet; Refill: 11    2. Smoking greater than 40 pack years  Assessment & Plan:  Patient has a 45 pack-year smoking and quit in 2019. We discussed lung cancer screening with low-dose CT including benefits (early diagnosis, reduced risk of death from lung cancer and decreased worry) and harms (false-positive findings, over-diagnosis, radiation exposure, increased anxiety) of screening and importance of adherence to the screening program with annual imaging at minimum. Patient is agreeable to lung cancer screening and to this end we will obtain a low-dose CT annual.   - LDCT ordered, due 01/2026      Orders:  -     CT Chest Lung Screening Low Dose; Future; Expected date: 01/13/2026    3. Centrilobular emphysema  Assessment & Plan:  64 y.o. F with prior nicotine  dependence with ongoing exposure and moderate COPD (FEV1 1.52, 2024) with centrilobular emphysema on imaging and chronic bronchitis phenotype presents for routine follow up with complaints of poorly controlled symptoms. She has worsening respiratory symptoms with CAT score 23 while off her Trelegy maintenance therapy due to subjective side effects. Plan for management as follows:  - start Breztri inhaler BID   -- 2 samples provided   -- [id number] placed for assistance with fill  - stop Trelegy  - given chronic bronchitis phenotype, will start chronic azithromycin therapy for goal decrease exacerbations  - start low dose prednisone daily  - cont JAZMIN PRN  - 6MWD good with no oxygen requirements      Orders:  -     budesonide-glycopyr-formoterol 160-9-4.8 mcg/actuation HFAA; Inhale 2 puffs into the lungs 2 (two) times daily.  Dispense: 10.7 g; Refill: 11  -     Ambulatory referral/consult to Pharmacy Assistance; Future; Expected date: 06/16/2025  -     azithromycin (Z-SILVIO) 250 MG tablet; Take 1 tablet (250 mg total) by mouth once daily.  Dispense: 30 tablet; Refill: 11         This note was generated with the assistance of ambient listening technology. Verbal consent was obtained by the patient and accompanying visitor(s) for the recording of patient appointment to facilitate this note. I attest to having reviewed and edited the generated note for accuracy, though some syntax or spelling errors may persist. Please contact the author of this note for any clarification.       Follow up in about 8 months (around 2/9/2026) for Routine follow up.    Case was discussed with patient; all questions were answered to patient's satisfaction and patient verbalized understanding.     Marilin Anders MD  Pulmonary Medicine  [facility]  Phone: [phone]

## 2025-06-09 NOTE — PROGRESS NOTES
Jaskaran     Ms. Claire's case has been assigned to Hailey Shiva @[phone].       What happens next? Assigned advocate will review your patients chart and research available options.  Patient may be asked to provide specific documentation to help determine eligibility. Failure to provide the requested documentation will delay assistance.    Please note all requests are subject to program availability and patient eligibility verification.   Please note each program has it's own unique eligibility criteria (e.g., income limits, insurance status medical condition, residency).Therefore eligibility is determined by the specific program being applied to not by the Pharmacy Patient Assistance Team.  Please note epic chart must reflect a current order for the requested medication written by an [facility] provider to begin PAP process.   Provider may review progress notes by typing pharmacy patient assistance in Epic search box.       Thank you,   [facility]  [address] [facility] [id number]  [place] [address]  Fax: [phone]  Email: [email or web address]

## 2025-06-09 NOTE — PROGRESS NOTES
Jaskaran     Ms. Claire's case has been assigned to Jerri Wild @[phone].       What happens next? Assigned advocate will review your patients chart and research available options.  Patient may be asked to provide specific documentation to help determine eligibility. Failure to provide the requested documentation will delay assistance.    Please note all requests are subject to program availability and patient eligibility verification.   Please note each program has it's own unique eligibility criteria (e.g., income limits, insurance status medical condition, residency).Therefore eligibility is determined by the specific program being applied to not by the Pharmacy Patient Assistance Team.  Please note epic chart must reflect a current order for the requested medication written by an [facility] provider to begin PAP process.   Provider may review progress notes by typing pharmacy patient assistance in Epic search box.       Thank you,   [facility]  [id number]  [place] [address]  Fax: [phone]  Email: [email or web address]

## 2025-06-10 ENCOUNTER — TELEPHONE (OUTPATIENT)
Dept: PHARMACY | Facility: CLINIC | Age: 65
End: 2025-06-10
Payer: COMMERCIAL

## 2025-06-10 ENCOUNTER — RESULTS FOLLOW-UP (OUTPATIENT)
Dept: PHARMACY | Facility: CLINIC | Age: 65
End: 2025-06-10

## 2025-06-10 ENCOUNTER — TELEPHONE (OUTPATIENT)
Dept: FAMILY MEDICINE | Facility: CLINIC | Age: 65
End: 2025-06-10
Payer: COMMERCIAL

## 2025-06-10 PROBLEM — J43.2 CENTRILOBULAR EMPHYSEMA: Status: ACTIVE | Noted: 2024-12-16

## 2025-06-10 LAB
OHS QRS DURATION: 76 MS
OHS QTC CALCULATION: 433 MS

## 2025-06-10 NOTE — LETTER
Marilin 10, 2025    Neyda YVETTE Leblancby  [address] [place] [address]         Dear Ms. Claire,    My name is Hailey Shannon, and I am reaching out on behalf of [facility]s Pharmacy Patient Assistance Team regarding your request for medication assistance. Our goal is to help qualified [facility] patients obtain financial assistance for prescribed medications.    Please note that enrollment into available support may require the following documents:    Completed Medication Access Center authorization forms, Copy of all insurance cards (front and back), Documentation from your pharmacy or insurance company showing your cost to purchase requested medication, and Proof of household income (such as social security award letter, pension statement or 3 consecutive pay stubs)    If you still need assistance with your medications, please reach out to the phone number listed below. If we do not hear back from you, a second contact attempt will be made via mail or your [facility] portal in 5 to 10 business days.    Thank you for giving us the opportunity to assist you with your healthcare needs. We look forward to working with you.      Sincerely  Hailey Shannon @[phone]  Pharmacy Patient Assistance Team  [address] [place] [address]  [id number] [place] [address]  Fax: [phone]  Email: [email or web address]

## 2025-06-10 NOTE — LETTER
Fax Transmission                                                                                                                                                       Date: Marilin 10, 2025       To:  Burt From: Samara SolanoSHEILA-RODRIGO   Fax:  [phone] Fax: [phone]   Phone:   Phone: [phone]     Special Instructions:     For questions or issues please contact Malia Blevins LPN at [phone].   Thank you.                            IF THERE ARE ANY PROBLEMS WITH THIS TRANSMISSION, PLEASE CALL IMMEDIATELY. THANK YOU    CONFIDENTIALITY NOTICE: The accompanying facsimile is intended solely for the use of the recipient designated above. Document(s) transmitted herewith may contain information that is confidential and privileged. Delivery, distribution of dissemination of this communication other than to the intended recipient is strictly prohibited. If you are another healthcare provider and have received this facsimile in error, please properly dispose and notify the sender. If you are NOT a healthcare provider and have received this facsimile in error, please notify [facility]s Compliance & Privacy Department immediately by email at [email or web address]

## 2025-06-10 NOTE — ASSESSMENT & PLAN NOTE
64 y.o. F with prior nicotine dependence with ongoing exposure and moderate COPD (FEV1 1.52, 2024) with centrilobular emphysema on imaging and chronic bronchitis phenotype presents for routine follow up with complaints of poorly controlled symptoms. She has worsening respiratory symptoms with CAT score 23 while off her Trelegy maintenance therapy due to subjective side effects. Plan for management as follows:  - start Breztri inhaler BID   -- 2 samples provided   -- [id number] placed for assistance with fill  - stop Trelegy  - given chronic bronchitis phenotype, will start chronic azithromycin therapy for goal decrease exacerbations  - start low dose prednisone daily  - cont JAZMIN PRN  - 6MWD good with no oxygen requirements

## 2025-06-10 NOTE — TELEPHONE ENCOUNTER
First contact attempt has been made via phone . Welcome letter and [facility] Enrollment Packet has been sent via letter. Follow-up will be made in approximately 5 to 10 business days.      Hailey Shannon  Pharmacy Patient Assistance Team

## 2025-06-10 NOTE — ASSESSMENT & PLAN NOTE
Patient has a 45 pack-year smoking and quit in 2019. We discussed lung cancer screening with low-dose CT including benefits (early diagnosis, reduced risk of death from lung cancer and decreased worry) and harms (false-positive findings, over-diagnosis, radiation exposure, increased anxiety) of screening and importance of adherence to the screening program with annual imaging at minimum. Patient is agreeable to lung cancer screening and to this end we will obtain a low-dose CT annual.   - LDCT ordered, due 01/2026

## 2025-06-10 NOTE — PROGRESS NOTES
Location: [facility] OR;  Service: General;;    LYSIS OF ADHESIONS  12/13/2023    Procedure: LYSIS, ADHESIONS;  Surgeon: Hebert Busby DO;  Location: [facility] OR;  Service: General;;    OOPHORECTOMY      REPAIR, HERNIA, INGUINAL  12/13/2023    Procedure: REPAIR, HERNIA, INGUINAL;  Surgeon: Hebert Busby DO;  Location: [facility] OR;  Service: General;;    TUBAL LIGATION          EKG - 0 Result Notes       Component  Ref Range & Units (hover) 3 wk ago 6 mo ago   QRS Duration 76 82   [facility] QTC Calculation 433 440   Resulting Agency [facility]EM[facility] GEMUSE              Narrative  Performed by: GEMUSE  Test Reason : R42,    Vent. Rate :  78 BPM     Atrial Rate :  78 BPM     P-R Int : 120 ms          QRS Dur :  76 ms      QT Int : 380 ms       P-R-T Axes :  75 -32  70 degrees    QTcB Int : 433 ms    Normal sinus rhythm  Left axis deviation  Anteroseptal infarct (cited on or before 05-Dec-2024)  Abnormal ECG  When compared with ECG of 05-Dec-2024 16:36,  The axis Shifted left  Confirmed by Luis Enrique Jeter ([id number]) on 6/10/2025 12:26:12 PM    Referred By: YUDITH PATEL           Confirmed By: Luis Enrique Jeter   Specimen Collected: 06/02/25 15:15 CDT Last Resulted: 06/10/25 12:26 CDT                ECHO - No results found for this or any previous visit.       CATH - No results found for this or any previous visit.         Lab Results   Component Value Date     05/29/2025    K 3.1 (L) 05/29/2025     05/29/2025    CO2 22 (L) 05/29/2025    BUN 6 (L) 05/29/2025    CREATININE 0.65 05/29/2025    CALCIUM 9.5 05/29/2025    ANIONGAP 21 (H) 05/29/2025    ESTGFRAFRICA 106 06/27/2024       Lab Results   Component Value Date    CHOL 189 02/22/2023    CHOL 174 03/29/2022     Lab Results   Component Value Date    HDL 81 (H) 02/22/2023    HDL 71 (H) 03/29/2022     Lab Results   Component Value Date    LDLCALC 95 02/22/2023    LDLCALC 91 03/29/2022     Lab Results   Component Value Date    TRIG 63 02/22/2023    TRIG 60 03/29/2022     Lab  Results   Component Value Date    CHOLHDL 2.3 02/22/2023    CHOLHDL 2.5 03/29/2022       Lab Results   Component Value Date    WBC 14.27 (H) 05/29/2025    HGB 14.4 05/29/2025    HCT 45.6 05/29/2025    .0 (H) 05/29/2025     (H) 05/29/2025         Current Medications[1]    Review of Systems   Constitutional: Negative for diaphoresis, malaise/fatigue, night sweats and weight gain.   HENT:  Negative for congestion, ear pain, hearing loss, nosebleeds and sore throat.    Eyes:  Negative for blurred vision, double vision, pain, photophobia and visual disturbance.   Cardiovascular:  Positive for chest pain, irregular heartbeat and palpitations. Negative for claudication, dyspnea on exertion, leg swelling, near-syncope, orthopnea and syncope.   Respiratory:  Positive for shortness of breath. Negative for cough, sleep disturbances due to breathing, snoring and wheezing.    Endocrine: Negative for cold intolerance, heat intolerance, polydipsia, polyphagia and polyuria.   Hematologic/Lymphatic: Negative for bleeding problem. Does not bruise/bleed easily.   Skin:  Negative for dry skin, flushing, itching, rash and skin cancer.   Musculoskeletal:  Negative for arthritis, back pain, falls, joint pain, muscle cramps, muscle weakness and myalgias.   Gastrointestinal:  Positive for heartburn. Negative for abdominal pain, change in bowel habit, constipation, diarrhea, dysphagia, nausea and vomiting.   Genitourinary:  Negative for bladder incontinence, dysuria, flank pain, frequency and nocturia.   Neurological:  Positive for dizziness. Negative for focal weakness, headaches, light-headedness, loss of balance, numbness, paresthesias and seizures.   Psychiatric/Behavioral:  Positive for depression. Negative for memory loss and substance abuse. The patient is nervous/anxious.         Bipolar, controlled on current meds.    Allergic/Immunologic: Negative for environmental allergies.    Current Medications  as of 6/27/2025  "3:48 AM     Current Outpatient Medications:     albuterol (PROVENTIL) 2.5 mg /3 mL (0.083 %) nebulizer solution, Take 3 mLs (2.5 mg total) by nebulization every 6 (six) hours as needed for Wheezing., Disp: 90 mL, Rfl: 1    albuterol (VENTOLIN HFA) 90 mcg/actuation inhaler, Inhale 2 puffs into the lungs every 4 (four) hours as needed for Wheezing or Shortness of Breath. Rescue, Disp: 18 g, Rfl: 11    benzonatate (TESSALON) 100 MG capsule, Take 1 capsule (100 mg total) by mouth 3 (three) times daily as needed for Cough., Disp: 90 capsule, Rfl: 1    pantoprazole (PROTONIX) 40 MG tablet, Take 1 tablet (40 mg total) by mouth once daily., Disp: 90 tablet, Rfl: 0    QUEtiapine (SEROQUEL) 50 MG tablet, Take 1 tablet (50 mg total) by mouth every evening., Disp: 30 tablet, Rfl: 11    tiZANidine (ZANAFLEX) 4 MG tablet, Take 1 tablet (4 mg total) by mouth every 6 (six) hours as needed (Neck pain)., Disp: 30 tablet, Rfl: 2    azithromycin (Z-SILVIO) 250 MG tablet, Take 1 tablet (250 mg total) by mouth once daily., Disp: 30 tablet, Rfl: 11    budesonide-glycopyr-formoterol 160-9-4.8 mcg/actuation HFAA, Inhale 2 puffs into the lungs 2 (two) times daily., Disp: 10.7 g, Rfl: 11    meloxicam (MOBIC) 15 MG tablet, Take 1 tablet by mouth once daily, Disp: 30 tablet, Rfl: 0    montelukast (SINGULAIR) 10 mg tablet, Take 1 tablet (10 mg total) by mouth every evening., Disp: 90 tablet, Rfl: 1    traZODone (DESYREL) 100 MG tablet, Take 1 tablet by mouth in the evening, Disp: 90 tablet, Rfl: 0      Objective:   BP (!) 120/100 (BP Location: Left arm, Patient Position: Sitting)   Pulse 81   Ht 5' 5" (1.651 m)   Wt 39.5 kg (87 lb)   SpO2 97%   BMI 14.48 kg/m²       Physical Exam  Vitals and nursing note reviewed.   Constitutional:       Appearance: Normal appearance.   HENT:      Head: Normocephalic and atraumatic.      Right Ear: External ear normal.      Left Ear: External ear normal.   Eyes:      General: No scleral icterus.        Right " eye: No discharge.         Left eye: No discharge.      Extraocular Movements: Extraocular movements intact.      Conjunctiva/sclera: Conjunctivae normal.      Pupils: Pupils are equal, round, and reactive to light.   Cardiovascular:      Rate and Rhythm: Normal rate and regular rhythm.      Pulses: Normal pulses.      Heart sounds: Normal heart sounds. No murmur heard.     No friction rub. No gallop.   Pulmonary:      Effort: Pulmonary effort is normal.      Breath sounds: Normal breath sounds. No wheezing, rhonchi or rales.   Chest:      Chest wall: No tenderness.   Abdominal:      General: Abdomen is flat. Bowel sounds are normal. There is no distension.      Palpations: Abdomen is soft.      Tenderness: There is no abdominal tenderness. There is no guarding or rebound.   Musculoskeletal:         General: No swelling or tenderness. Normal range of motion.      Cervical back: Normal range of motion and neck supple.   Skin:     General: Skin is warm and dry.      Findings: No erythema or rash.   Neurological:      General: No focal deficit present.      Mental Status: She is alert and oriented to person, place, and time.      Cranial Nerves: No cranial nerve deficit.      Motor: No weakness.      Gait: Gait normal.   Psychiatric:         Mood and Affect: Mood normal.         Behavior: Behavior normal.         Thought Content: Thought content normal.         Judgment: Judgment normal.         Assessment:     1. Abnormal EKG  Ambulatory referral/consult to Cardiology    NM Myocardial Perfusion Spect Multi Pharmacologic    Nuclear Stress Test    Echo    ekg suggests recent ischemic event with leftward axis shift, poor r wave progression consistent with old anterior MI      2. Dizziness  Ambulatory referral/consult to Cardiology    EKG 12-lead    dizziness associated palpitations, await Zio results      3. Palpitations  Cardiac Monitor - 3-15 Day Adult    Recent onset palpitations, will place on outpatient tele to  monitor arrhythmia      4. Bipolar depression      symptoms controlled on current meds      5. Chest pain, unspecified type      Chest pain, atypical, will order lexiscan cardolyte stress imaging to evaluate for ischemic substrate, add ASA 81 mg q d.            Plan:   Add ASA 81 mg q d, order echo, lexiscan cardiolyte stress imaging, and outpatient tele with Jarrett brambila pt to follow up to review results of cardiac imaging and outpatient telemetry.                [1]   Current Outpatient Medications:     albuterol (PROVENTIL) 2.5 mg /3 mL (0.083 %) nebulizer solution, Take 3 mLs (2.5 mg total) by nebulization every 6 (six) hours as needed for Wheezing., Disp: 90 mL, Rfl: 1    albuterol (VENTOLIN HFA) 90 mcg/actuation inhaler, Inhale 2 puffs into the lungs every 4 (four) hours as needed for Wheezing or Shortness of Breath. Rescue, Disp: 18 g, Rfl: 11    benzonatate (TESSALON) 100 MG capsule, Take 1 capsule (100 mg total) by mouth 3 (three) times daily as needed for Cough., Disp: 90 capsule, Rfl: 1    pantoprazole (PROTONIX) 40 MG tablet, Take 1 tablet (40 mg total) by mouth once daily., Disp: 90 tablet, Rfl: 0    QUEtiapine (SEROQUEL) 50 MG tablet, Take 1 tablet (50 mg total) by mouth every evening., Disp: 30 tablet, Rfl: 11    tiZANidine (ZANAFLEX) 4 MG tablet, Take 1 tablet (4 mg total) by mouth every 6 (six) hours as needed (Neck pain)., Disp: 30 tablet, Rfl: 2    azithromycin (Z-SILVIO) 250 MG tablet, Take 1 tablet (250 mg total) by mouth once daily., Disp: 30 tablet, Rfl: 11    budesonide-glycopyr-formoterol 160-9-4.8 mcg/actuation HFAA, Inhale 2 puffs into the lungs 2 (two) times daily., Disp: 10.7 g, Rfl: 11    meloxicam (MOBIC) 15 MG tablet, Take 1 tablet by mouth once daily, Disp: 30 tablet, Rfl: 0    montelukast (SINGULAIR) 10 mg tablet, Take 1 tablet (10 mg total) by mouth every evening., Disp: 90 tablet, Rfl: 1    traZODone (DESYREL) 100 MG tablet, Take 1 tablet by mouth in the evening, Disp: 90 tablet, Rfl:  0

## 2025-06-10 NOTE — TELEPHONE ENCOUNTER
----- Message from Paulo Guthrie sent at 6/9/2025  4:31 PM CDT -----  Regarding: FW: Order for JETT GOFF    ----- Message -----  From: Judi Berger  Sent: 6/9/2025   4:24 PM CDT  To: Jerri Wild; Marilin Anders MD  Subject: Order for JETT GOFF,     Ms. Shell's case has been assigned to Jerri Wild @[phone].       What happens next? Assigned advocate will review your patients chart and research available options.  Patient may be asked to provide specific documentation to help determine eligibility. Failure to provide the requested documentation will delay assistance.    Please note all requests are subject to program availability and patient eligibility verification.   Please note each program has it's own unique eligibility criteria (e.g., income limits, insurance status medical condition, residency).Therefore eligibility is determined by the specific program   being applied to not by the Pharmacy Patient Assistance Team.  Please note epic chart must reflect a current order for the requested medication written by an [facility] provider to begin PAP process.   Provider may review progress notes by typing pharmacy patient assistance in Epic search box.       Thank you,   [facility]  [id number]  [place] [address]  Fax: [phone]  Email: [email or web address]      ----- Message -----  From: Marilin Anders MD  Sent: 6/9/2025   2:42 PM CDT  To: Pharmacy Patient Assistance Team  Subject: Order for SHELLJETT T

## 2025-06-12 ENCOUNTER — HOSPITAL ENCOUNTER (OUTPATIENT)
Dept: RADIOLOGY | Facility: HOSPITAL | Age: 65
Discharge: HOME | End: 2025-06-12
Attending: RADIOLOGY
Payer: COMMERCIAL

## 2025-06-12 DIAGNOSIS — R92.8 ABNORMAL MAMMOGRAM: ICD-10-CM

## 2025-06-12 PROCEDURE — 77061 BREAST TOMOSYNTHESIS UNI: CPT | Mod: 26,LT,, | Performed by: RADIOLOGY

## 2025-06-12 PROCEDURE — 76641 ULTRASOUND BREAST COMPLETE: CPT | Mod: TC,50

## 2025-06-12 PROCEDURE — 77061 BREAST TOMOSYNTHESIS UNI: CPT | Mod: TC,LT

## 2025-06-12 PROCEDURE — 77065 DX MAMMO INCL CAD UNI: CPT | Mod: 26,LT,, | Performed by: RADIOLOGY

## 2025-06-15 DIAGNOSIS — G47.00 INSOMNIA, UNSPECIFIED TYPE: Chronic | ICD-10-CM

## 2025-06-16 RX ORDER — TRAZODONE HYDROCHLORIDE 100 MG/1
100 TABLET ORAL NIGHTLY
Qty: 90 TABLET | Refills: 0 | Status: SHIPPED | OUTPATIENT
Start: 2025-06-16

## 2025-06-17 ENCOUNTER — RESULTS FOLLOW-UP (OUTPATIENT)
Dept: FAMILY MEDICINE | Facility: CLINIC | Age: 65
End: 2025-06-17

## 2025-06-19 ENCOUNTER — TELEPHONE (OUTPATIENT)
Dept: CARDIOLOGY | Facility: HOSPITAL | Age: 65
End: 2025-06-19
Payer: COMMERCIAL

## 2025-06-20 ENCOUNTER — HOSPITAL ENCOUNTER (OUTPATIENT)
Dept: RADIOLOGY | Facility: HOSPITAL | Age: 65
Discharge: HOME | End: 2025-06-20
Attending: INTERNAL MEDICINE
Payer: COMMERCIAL

## 2025-06-20 ENCOUNTER — HOSPITAL ENCOUNTER (OUTPATIENT)
Dept: CARDIOLOGY | Facility: HOSPITAL | Age: 65
Discharge: HOME | End: 2025-06-20
Attending: INTERNAL MEDICINE
Payer: COMMERCIAL

## 2025-06-20 VITALS — DIASTOLIC BLOOD PRESSURE: 92 MMHG | SYSTOLIC BLOOD PRESSURE: 123 MMHG | HEART RATE: 63 BPM

## 2025-06-20 DIAGNOSIS — R94.31 ABNORMAL EKG: ICD-10-CM

## 2025-06-20 LAB
AORTIC ROOT ANNULUS: 1.7 CM
AORTIC VALVE CUSP SEPERATION: 1.51 CM
ASCENDING AORTA: 3.2 CM
AV INDEX (PROSTH): 0.69
AV MEAN GRADIENT: 2 MMHG
AV PEAK GRADIENT: 4 MMHG
AV VALVE AREA BY VELOCITY RATIO: 2.2 CM²
AV VALVE AREA: 2.2 CM²
AV VELOCITY RATIO: 0.7
CV ECHO LV RWT: 0.38 CM
CV STRESS BASE HR: 63 BPM
DIASTOLIC BLOOD PRESSURE: 92 MMHG
DOP CALC AO PEAK VEL: 1 M/S
DOP CALC AO VTI: 24.8 CM
DOP CALC LVOT AREA: 3.1 CM2
DOP CALC LVOT DIAMETER: 2 CM
DOP CALC LVOT PEAK VEL: 0.7 M/S
DOP CALC LVOT STROKE VOLUME: 53.4 CM3
DOP CALCLVOT PEAK VEL VTI: 17 CM
E WAVE DECELERATION TIME: 225 MSEC
E/A RATIO: 1.56
ECHO LV POSTERIOR WALL: 0.8 CM (ref 0.6–1.1)
FRACTIONAL SHORTENING: 38.1 % (ref 28–44)
INTERVENTRICULAR SEPTUM: 0.9 CM (ref 0.6–1.1)
IVC DIAMETER: 1.32 CM
IVRT: 65 MSEC
LEFT ATRIUM AREA SYSTOLIC (APICAL 2 CHAMBER): 5.89 CM2
LEFT ATRIUM AREA SYSTOLIC (APICAL 4 CHAMBER): 11.81 CM2
LEFT ATRIUM VOLUME MOD: 21 ML
LEFT INTERNAL DIMENSION IN SYSTOLE: 2.6 CM (ref 2.1–4)
LEFT VENTRICLE DIASTOLIC VOLUME: 77 ML
LEFT VENTRICLE END SYSTOLIC VOLUME APICAL 2 CHAMBER: 11.12 ML
LEFT VENTRICLE END SYSTOLIC VOLUME APICAL 4 CHAMBER: 29.04 ML
LEFT VENTRICLE SYSTOLIC VOLUME: 23 ML
LEFT VENTRICULAR INTERNAL DIMENSION IN DIASTOLE: 4.2 CM (ref 3.5–6)
LEFT VENTRICULAR MASS: 109.8 G
LVED V (TEICH): 77.12 ML
LVES V (TEICH): 23.45 ML
LVOT MG: 1.21 MMHG
LVOT MV: 0.52 CM/S
MV PEAK A VEL: 0.45 M/S
MV PEAK E VEL: 0.7 M/S
MV STENOSIS PRESSURE HALF TIME: 65.35 MS
MV VALVE AREA P 1/2 METHOD: 3.37 CM2
OHS CV CPX 1 MINUTE RECOVERY HEART RATE: 94 BPM
OHS CV CPX 85 PERCENT MAX PREDICTED HEART RATE MALE: 133
OHS CV CPX MAX PREDICTED HEART RATE: 156
OHS CV CPX PATIENT IS FEMALE: 1
OHS CV CPX PATIENT IS MALE: 0
OHS CV CPX PEAK DIASTOLIC BLOOD PRESSURE: 92 MMHG
OHS CV CPX PEAK HEAR RATE: 96 BPM
OHS CV CPX PEAK RATE PRESSURE PRODUCT: NORMAL
OHS CV CPX PEAK SYSTOLIC BLOOD PRESSURE: 132 MMHG
OHS CV CPX PERCENT MAX PREDICTED HEART RATE ACHIEVED: 64
OHS CV CPX RATE PRESSURE PRODUCT PRESENTING: 7749
OHS CV RV/LV RATIO: 0.62 CM
PISA TR MAX VEL: 2.6 M/S
PV MV: 0.4 M/S
PV PEAK GRADIENT: 2 MMHG
PV PEAK VELOCITY: 0.62 M/S
RA VOL SYS: 12.85 ML
RIGHT ATRIAL AREA: 7.4 CM2
RIGHT ATRIUM VOLUME AREA LENGTH APICAL 4 CHAMBER: 13.22 ML
RIGHT VENTRICLE DIASTOLIC BASEL DIMENSION: 2.6 CM
RIGHT VENTRICLE DIASTOLIC LENGTH: 5 CM
RIGHT VENTRICLE DIASTOLIC MID DIMENSION: 1.3 CM
RIGHT VENTRICULAR END-DIASTOLIC DIMENSION: 2.55 CM
RIGHT VENTRICULAR LENGTH IN DIASTOLE (APICAL 4-CHAMBER VIEW): 4.99 CM
RV MID DIAMA: 1.28 CM
STJ: 2.9 CM
SYSTOLIC BLOOD PRESSURE: 123 MMHG
TR MAX PG: 26 MMHG
TRICUSPID ANNULAR PLANE SYSTOLIC EXCURSION: 2.8 CM

## 2025-06-20 PROCEDURE — 78452 HT MUSCLE IMAGE SPECT MULT: CPT | Mod: TC

## 2025-06-20 PROCEDURE — 93016 CV STRESS TEST SUPVJ ONLY: CPT | Mod: ,,, | Performed by: INTERNAL MEDICINE

## 2025-06-20 PROCEDURE — A9500 TC99M SESTAMIBI: HCPCS | Performed by: INTERNAL MEDICINE

## 2025-06-20 PROCEDURE — 78452 HT MUSCLE IMAGE SPECT MULT: CPT | Mod: 26,,, | Performed by: STUDENT IN AN ORGANIZED HEALTH CARE EDUCATION/TRAINING PROGRAM

## 2025-06-20 PROCEDURE — 93017 CV STRESS TEST TRACING ONLY: CPT

## 2025-06-20 PROCEDURE — 63600175 PHARM REV CODE 636 W HCPCS: Performed by: INTERNAL MEDICINE

## 2025-06-20 PROCEDURE — 93306 TTE W/DOPPLER COMPLETE: CPT | Mod: 26,,, | Performed by: INTERNAL MEDICINE

## 2025-06-20 PROCEDURE — 93306 TTE W/DOPPLER COMPLETE: CPT

## 2025-06-20 PROCEDURE — 93018 CV STRESS TEST I&R ONLY: CPT | Mod: ,,, | Performed by: INTERNAL MEDICINE

## 2025-06-20 RX ORDER — TETRAKIS(2-METHOXYISOBUTYLISOCYANIDE)COPPER(I) TETRAFLUOROBORATE 1 MG/ML
11.5 INJECTION, POWDER, LYOPHILIZED, FOR SOLUTION INTRAVENOUS
Status: COMPLETED | OUTPATIENT
Start: 2025-06-20 | End: 2025-06-20

## 2025-06-20 RX ORDER — REGADENOSON 0.08 MG/ML
0.4 INJECTION, SOLUTION INTRAVENOUS ONCE
Status: COMPLETED | OUTPATIENT
Start: 2025-06-20 | End: 2025-06-20

## 2025-06-20 RX ORDER — TETRAKIS(2-METHOXYISOBUTYLISOCYANIDE)COPPER(I) TETRAFLUOROBORATE 1 MG/ML
35 INJECTION, POWDER, LYOPHILIZED, FOR SOLUTION INTRAVENOUS
Status: COMPLETED | OUTPATIENT
Start: 2025-06-20 | End: 2025-06-20

## 2025-06-20 RX ADMIN — KIT FOR THE PREPARATION OF TECHNETIUM TC99M SESTAMIBI 35 MILLICURIE: 1 INJECTION, POWDER, LYOPHILIZED, FOR SOLUTION PARENTERAL at 09:06

## 2025-06-20 RX ADMIN — REGADENOSON 0.4 MG: 0.08 INJECTION, SOLUTION INTRAVENOUS at 09:06

## 2025-06-20 RX ADMIN — KIT FOR THE PREPARATION OF TECHNETIUM TC99M SESTAMIBI 11.5 MILLICURIE: 1 INJECTION, POWDER, LYOPHILIZED, FOR SOLUTION PARENTERAL at 08:06

## 2025-06-22 LAB
CV STRESS BASE HR: 63 BPM
DIASTOLIC BLOOD PRESSURE: 92 MMHG
OHS CV CPX 1 MINUTE RECOVERY HEART RATE: 94 BPM
OHS CV CPX 85 PERCENT MAX PREDICTED HEART RATE MALE: 133
OHS CV CPX MAX PREDICTED HEART RATE: 156
OHS CV CPX PATIENT IS FEMALE: 1
OHS CV CPX PATIENT IS MALE: 0
OHS CV CPX PEAK DIASTOLIC BLOOD PRESSURE: 92 MMHG
OHS CV CPX PEAK HEAR RATE: 96 BPM
OHS CV CPX PEAK RATE PRESSURE PRODUCT: NORMAL
OHS CV CPX PEAK SYSTOLIC BLOOD PRESSURE: 132 MMHG
OHS CV CPX PERCENT MAX PREDICTED HEART RATE ACHIEVED: 64
OHS CV CPX RATE PRESSURE PRODUCT PRESENTING: 7749
SYSTOLIC BLOOD PRESSURE: 123 MMHG

## 2025-06-23 DIAGNOSIS — M54.2 NECK PAIN: ICD-10-CM

## 2025-06-23 RX ORDER — MELOXICAM 15 MG/1
15 TABLET ORAL
Qty: 30 TABLET | Refills: 0 | Status: SHIPPED | OUTPATIENT
Start: 2025-06-23

## 2025-06-26 ENCOUNTER — TELEPHONE (OUTPATIENT)
Dept: PULMONOLOGY | Facility: CLINIC | Age: 65
End: 2025-06-26
Payer: COMMERCIAL

## 2025-06-26 NOTE — TELEPHONE ENCOUNTER
Spoke with patient about pharmacy assistance getting in touch with her but had no luck.  Patient did tell me she had been trying to get in touch yesterday but could not get an answer, voiced to me that she will be getting in touch today, did let Hailey Shannon know.

## 2025-06-27 LAB
AORTIC ROOT ANNULUS: 1.7 CM
AORTIC VALVE CUSP SEPERATION: 1.51 CM
ASCENDING AORTA: 3.2 CM
AV INDEX (PROSTH): 0.69
AV MEAN GRADIENT: 2 MMHG
AV PEAK GRADIENT: 4 MMHG
AV VALVE AREA BY VELOCITY RATIO: 2.2 CM²
AV VALVE AREA: 2.2 CM²
AV VELOCITY RATIO: 0.7
CV ECHO LV RWT: 0.38 CM
DOP CALC AO PEAK VEL: 1 M/S
DOP CALC AO VTI: 24.8 CM
DOP CALC LVOT AREA: 3.1 CM2
DOP CALC LVOT DIAMETER: 2 CM
DOP CALC LVOT PEAK VEL: 0.7 M/S
DOP CALC LVOT STROKE VOLUME: 53.4 CM3
DOP CALCLVOT PEAK VEL VTI: 17 CM
E WAVE DECELERATION TIME: 225 MSEC
E/A RATIO: 1.56
ECHO LV POSTERIOR WALL: 0.8 CM (ref 0.6–1.1)
FRACTIONAL SHORTENING: 38.1 % (ref 28–44)
INTERVENTRICULAR SEPTUM: 0.9 CM (ref 0.6–1.1)
IVC DIAMETER: 1.32 CM
IVRT: 65 MSEC
LEFT ATRIUM AREA SYSTOLIC (APICAL 2 CHAMBER): 5.89 CM2
LEFT ATRIUM AREA SYSTOLIC (APICAL 4 CHAMBER): 11.81 CM2
LEFT ATRIUM VOLUME MOD: 21 ML
LEFT INTERNAL DIMENSION IN SYSTOLE: 2.6 CM (ref 2.1–4)
LEFT VENTRICLE DIASTOLIC VOLUME: 77 ML
LEFT VENTRICLE END SYSTOLIC VOLUME APICAL 2 CHAMBER: 11.12 ML
LEFT VENTRICLE END SYSTOLIC VOLUME APICAL 4 CHAMBER: 29.04 ML
LEFT VENTRICLE SYSTOLIC VOLUME: 23 ML
LEFT VENTRICULAR INTERNAL DIMENSION IN DIASTOLE: 4.2 CM (ref 3.5–6)
LEFT VENTRICULAR MASS: 109.8 G
LVED V (TEICH): 77.12 ML
LVES V (TEICH): 23.45 ML
LVOT MG: 1.21 MMHG
LVOT MV: 0.52 CM/S
MV PEAK A VEL: 0.45 M/S
MV PEAK E VEL: 0.7 M/S
MV STENOSIS PRESSURE HALF TIME: 65.35 MS
MV VALVE AREA P 1/2 METHOD: 3.37 CM2
OHS CV RV/LV RATIO: 0.62 CM
PISA TR MAX VEL: 2.6 M/S
PV MV: 0.4 M/S
PV PEAK GRADIENT: 2 MMHG
PV PEAK VELOCITY: 0.62 M/S
RA VOL SYS: 12.85 ML
RIGHT ATRIAL AREA: 7.4 CM2
RIGHT ATRIUM VOLUME AREA LENGTH APICAL 4 CHAMBER: 13.22 ML
RIGHT VENTRICLE DIASTOLIC BASEL DIMENSION: 2.6 CM
RIGHT VENTRICLE DIASTOLIC LENGTH: 5 CM
RIGHT VENTRICLE DIASTOLIC MID DIMENSION: 1.3 CM
RIGHT VENTRICULAR END-DIASTOLIC DIMENSION: 2.55 CM
RIGHT VENTRICULAR LENGTH IN DIASTOLE (APICAL 4-CHAMBER VIEW): 4.99 CM
RV MID DIAMA: 1.28 CM
STJ: 2.9 CM
TR MAX PG: 26 MMHG
TRICUSPID ANNULAR PLANE SYSTOLIC EXCURSION: 2.8 CM

## 2025-07-16 ENCOUNTER — OFFICE VISIT (OUTPATIENT)
Dept: CARDIOLOGY | Facility: CLINIC | Age: 65
End: 2025-07-16
Payer: COMMERCIAL

## 2025-07-16 VITALS
SYSTOLIC BLOOD PRESSURE: 110 MMHG | HEART RATE: 72 BPM | DIASTOLIC BLOOD PRESSURE: 72 MMHG | OXYGEN SATURATION: 96 % | BODY MASS INDEX: 14.3 KG/M2 | WEIGHT: 85.81 LBS | HEIGHT: 65 IN

## 2025-07-16 DIAGNOSIS — R42 DIZZINESS: ICD-10-CM

## 2025-07-16 DIAGNOSIS — R00.2 PALPITATIONS: ICD-10-CM

## 2025-07-16 DIAGNOSIS — R94.31 ABNORMAL EKG: ICD-10-CM

## 2025-07-16 DIAGNOSIS — Z86.79 H/O HYPOTENSION: ICD-10-CM

## 2025-07-16 DIAGNOSIS — J43.2 CENTRILOBULAR EMPHYSEMA: Primary | ICD-10-CM

## 2025-07-16 PROCEDURE — 99214 OFFICE O/P EST MOD 30 MIN: CPT | Mod: PBBFAC | Performed by: INTERNAL MEDICINE

## 2025-07-16 PROCEDURE — 99214 OFFICE O/P EST MOD 30 MIN: CPT | Mod: S$PBB,,, | Performed by: INTERNAL MEDICINE

## 2025-07-16 PROCEDURE — 3008F BODY MASS INDEX DOCD: CPT | Mod: ,,, | Performed by: INTERNAL MEDICINE

## 2025-07-16 PROCEDURE — 3074F SYST BP LT 130 MM HG: CPT | Mod: ,,, | Performed by: INTERNAL MEDICINE

## 2025-07-16 PROCEDURE — 1159F MED LIST DOCD IN RCRD: CPT | Mod: ,,, | Performed by: INTERNAL MEDICINE

## 2025-07-16 PROCEDURE — 3078F DIAST BP <80 MM HG: CPT | Mod: ,,, | Performed by: INTERNAL MEDICINE

## 2025-07-16 PROCEDURE — 99999 PR PBB SHADOW E&M-EST. PATIENT-LVL IV: CPT | Mod: PBBFAC,,, | Performed by: INTERNAL MEDICINE

## 2025-07-16 RX ORDER — METOPROLOL SUCCINATE 25 MG/1
25 TABLET, EXTENDED RELEASE ORAL NIGHTLY
Qty: 90 TABLET | Refills: 1 | Status: CANCELLED | OUTPATIENT
Start: 2025-07-16

## 2025-07-16 NOTE — PROGRESS NOTES
PCP: Samara Solano FNP    Referring Provider:     Subjective:   Neyda Claire is a 64 y.o. female   who presents for evaluation of palpitations and chest pain                           Pt reports she continues to have episodes of palpitations, feeling her heart race and jump, comes and goes spontaneoulsy, last from several seconds to several minutes, associated with shortness of breath if episodes last over a few seconds.  She notes palpitations are associated dizziness if episodes last over a few seconds.  She also reports  chest pain, described as chest soreness, 3/10 at most severe, which can linger for 2 to 3 days after an episode. She notes chest pain starts in the middle of her chest and radiates to her back. She notes after more careful observation,  Chest pain usually occurs  with palpitations, not provoked by exercercise.        Fhx:  Family History   Problem Relation Name Age of Onset    Breast cancer Mother      Stroke Mother      Hypertension Mother      No Known Problems Father      Tuberculosis Paternal Grandfather      Liver cancer Brother        Shx:  Past Surgical History:   Procedure Laterality Date    APPENDECTOMY  12/13/2023    Procedure: APPENDECTOMY;  Surgeon: Hebert Busby DO;  Location: [facility] OR;  Service: General;;    CHOLANGIOGRAM N/A 12/13/2023    Procedure: CHOLANGIOGRAM;  Surgeon: Hebert Busby DO;  Location: [facility] OR;  Service: General;  Laterality: N/A;    CHOLECYSTECTOMY  12/13/2023    Procedure: CHOLECYSTECTOMY;  Surgeon: Hebert Busby DO;  Location: [facility] OR;  Service: General;;    EXCISION, SMALL INTESTINE N/A 12/13/2023    Procedure: SMALL BOWEL RESECTION;  Surgeon: Hebert Busby DO;  Location: [facility] OR;  Service: General;  Laterality: N/A;    HYSTERECTOMY      LAPAROSCOPY  12/13/2023    Procedure: LAPAROSCOPY;  Surgeon: Hebert Busby DO;  Location: [facility] OR;  Service: General;;    LYSIS OF ADHESIONS  12/13/2023    Procedure: LYSIS,  "ADHESIONS;  Surgeon: Hebert Busby DO;  Location: [facility] OR;  Service: General;;    OOPHORECTOMY      REPAIR, HERNIA, INGUINAL  12/13/2023    Procedure: REPAIR, HERNIA, INGUINAL;  Surgeon: Hebert Busby DO;  Location: [facility] OR;  Service: General;;    TUBAL LIGATION          EKG - 0 Result Notes       Component  Ref Range & Units (hover) 3 wk ago 6 mo ago   QRS Duration 76 82   [facility] QTC Calculation 433 440   Resulting Agency [facility]EM[facility] GEMUSE              Narrative  Performed by: GEMUSE  Test Reason : R42,    Vent. Rate :  78 BPM     Atrial Rate :  78 BPM     P-R Int : 120 ms          QRS Dur :  76 ms      QT Int : 380 ms       P-R-T Axes :  75 -32  70 degrees    QTcB Int : 433 ms    Normal sinus rhythm  Left axis deviation  Anteroseptal infarct (cited on or before 05-Dec-2024)  Abnormal ECG  When compared with ECG of 05-Dec-2024 16:36,  The axis Shifted left  Confirmed by Luis Enrique Jeter ([id number]) on 6/10/2025 12:26:12 PM    Referred By: YUDITH PATEL           Confirmed By: Luis Enrique Jeter   Specimen Collected: 06/02/25 15:15 CDT Last Resulted: 06/10/25 12:26 CDT            CV CARDIAC MONITOR - 8-15 DAY ADULT FACILITY HOOK UP    Height: 5' 5" (1.651 m)   Weight: 39.5 kg (87 lb)   Blood Pressure: Not recorded    Date of Study: 6/2/25   Ordering Provider: Luis Enrique Jeter DO   Clinical Indications: Palpitations [R00.2 (ICD-10-CM)]       [facility]  Performing Staff   Cardiology: Luis Enrique Jeter DO    Tech: Isaura Cerda LPN         Interpretation Summary  Show Result ComparisonPatient had a min HR of 52 bpm, max HR of 211 bpm, and avg HR of 87 bpm. Predominant underlying rhythm was Sinus Rhythm. 20 Supraventricular Tachycardia runs occurred, the run with the fastest interval lasting 11 beats with a max rate of 211 bpm, the longest lasting 17 beats with an avg rate of 98 bpm. Isolated SVEs were rare (<1.0%, 1656), SVE Couplets were rare (<1.0%, 74), and SVE Triplets were rare (<1.0%, 19). Isolated VEs " "were rare (<1.0%, 547), and no VE Couplets or VE Triplets were present.      Predominant NSR  Rare PACs, supraventricular couplets  Rave PVCs,   Asymptomatic short runs of SVT up to 211 bpm  No significant pauses  Pt triggered events with NSR, PACs, PVCs.      Vitals    Accession #: [id number]  Transthoracic echo (TTE) complete    Height: 5' 5" (1.651 m)   Weight: 39.5 kg (87 lb)   Blood Pressure: 123/92    Date of Study: 6/20/25   Ordering Provider: Luis Enrique Jeter DO   Clinical Indications: Abnormal EKG [R94.31 (ICD-10-CM)]       [facility]  Performing Staff   Cardiology: Luis Enrique Jeter DO    Tech: Arline Hutchinson, RRT         View Images Vital Vitrea     Show images for Echo  Summary  Show Result Comparison     Left Ventricle: The left ventricle is normal in size. Normal wall thickness. There is normal systolic function with a visually estimated ejection fraction of 55 - 60%. Grade I diastolic dysfunction.    Right Ventricle: The right ventricle is normal in size measuring 2.6 cm. Systolic function is normal.    Tricuspid Valve: There is mild regurgitation.     Vitals    Height Weight BMI (Calculated) BSA (Calculated - sq m) BP Pulse       123/92 63     Study Details A complete echo was performed using complete 2D, color flow Doppler and spectral Doppler. During the study, the apical, parasternal and subcostal views were captured. There was No saline (bubble) used.  Overall the study quality was technically difficult. The study was difficult due to patient's body habitus.     Echocardiography Findings    Left Ventricle The left ventricle is normal in size. Normal wall thickness. Normal wall motion. There is normal systolic function with a visually estimated ejection fraction of 55 - 60%. Grade I diastolic dysfunction.   Right Ventricle The right ventricle is normal in size measuring 2.6 cm. Systolic function is normal.   Left Atrium The left atrium is normal in size   Right Atrium The right atrium is " normal in size.   Aortic Valve The aortic valve is a trileaflet valve. There is normal leaflet mobility. Aortic valve peak velocity is 1.0 m/s. Mean gradient is 2 mmHg. There is no significant regurgitation.   Mitral Valve The mitral valve is structurally normal. There is normal leaflet mobility. There is no significant regurgitation.   Tricuspid Valve The tricuspid valve is structurally normal. There is normal leaflet mobility. There is mild regurgitation.   Pulmonic Valve The pulmonic valve is structurally normal. There is no significant regurgitation.   Pericardium and Other Findings There is no pericardial effusion.              Reading Physician Reading Date Result Priority   Moi Lu MD  [phone]  6/23/2025 Routine     Narrative & Impression  EXAMINATION:  NM MYOCARDIAL PERFUSION SPECT MULTI PHARM     CLINICAL HISTORY:  Chest pain/anginal equiv, ECGs or troponins abnormal;Chest pain;  Abnormal electrocardiogram (ECG) (EKG)     TECHNIQUE:  SPECT images in short, vertical and horizontal long axis were acquired 30 minutes after the injection of 11.5 mCi of Tc-99m sestamibi at rest and 35 mCi during a cardiac stress. The clinical stress and ECG portion of the study is to be read separately.     COMPARISON:  None.     FINDINGS:  The quality of the study is good.     Stress SPECT images demonstrate small size, more severe intensity, fixed perfusion defect involving the inferior septal wall suggestive of infarct.  Removed     The gated post-stress images reveal normal wall motion and normal systolic wall thickening with an estimated LVEF of 74 %. The LV cavity is not dilated with an end-diastolic volume of 56 ml and an end-systolic volume of 15 ml.     TID ratio is normal 1.02     Impression:     1. Cardiac SPECT is abnormal with a fixed perfusion defect involving the inferior septal wall suggestive of infarct versus artifact; clinical correlation advised.  2. the global left ventricular systolic  function is normal with an LV ejection fraction of 74 % and no evidence of LV dilatation. Wall motion is normal.        Electronically signed by:Moi Lu  Date:                                            06/23/2025  Time:                                           14:53    Lab Results   Component Value Date     05/29/2025    K 3.1 (L) 05/29/2025     05/29/2025    CO2 22 (L) 05/29/2025    BUN 6 (L) 05/29/2025    CREATININE 0.65 05/29/2025    CALCIUM 9.5 05/29/2025    ANIONGAP 21 (H) 05/29/2025    ESTGFRAFRICA 106 06/27/2024       Lab Results   Component Value Date    CHOL 189 02/22/2023    CHOL 174 03/29/2022     Lab Results   Component Value Date    HDL 81 (H) 02/22/2023    HDL 71 (H) 03/29/2022     Lab Results   Component Value Date    LDLCALC 95 02/22/2023    LDLCALC 91 03/29/2022     Lab Results   Component Value Date    TRIG 63 02/22/2023    TRIG 60 03/29/2022     Lab Results   Component Value Date    CHOLHDL 2.3 02/22/2023    CHOLHDL 2.5 03/29/2022       Lab Results   Component Value Date    WBC 14.27 (H) 05/29/2025    HGB 14.4 05/29/2025    HCT 45.6 05/29/2025    .0 (H) 05/29/2025     (H) 05/29/2025         Current Medications[1]    Review of Systems   Constitutional: Negative for diaphoresis, malaise/fatigue, night sweats and weight gain.   HENT:  Negative for congestion, ear pain, hearing loss, nosebleeds and sore throat.    Eyes:  Negative for blurred vision, double vision, pain, photophobia and visual disturbance.   Cardiovascular:  Positive for chest pain, irregular heartbeat and palpitations. Negative for claudication, dyspnea on exertion, leg swelling, near-syncope, orthopnea and syncope.   Respiratory:  Positive for shortness of breath. Negative for cough, sleep disturbances due to breathing, snoring and wheezing.    Endocrine: Negative for cold intolerance, heat intolerance, polydipsia, polyphagia and polyuria.   Hematologic/Lymphatic: Negative for bleeding problem.  Does not bruise/bleed easily.   Skin:  Negative for dry skin, flushing, itching, rash and skin cancer.   Musculoskeletal:  Negative for arthritis, back pain, falls, joint pain, muscle cramps, muscle weakness and myalgias.   Gastrointestinal:  Positive for heartburn. Negative for abdominal pain, change in bowel habit, constipation, diarrhea, dysphagia, nausea and vomiting.   Genitourinary:  Negative for bladder incontinence, dysuria, flank pain, frequency and nocturia.   Neurological:  Positive for dizziness. Negative for focal weakness, headaches, light-headedness, loss of balance, numbness, paresthesias and seizures.   Psychiatric/Behavioral:  Positive for depression. Negative for memory loss and substance abuse. The patient is nervous/anxious.         Bipolar, controlled on current meds.    Allergic/Immunologic: Negative for environmental allergies.    Current Medications  as of 6/27/2025 3:48 AM     Current Outpatient Medications:     albuterol (PROVENTIL) 2.5 mg /3 mL (0.083 %) nebulizer solution, Take 3 mLs (2.5 mg total) by nebulization every 6 (six) hours as needed for Wheezing., Disp: 90 mL, Rfl: 1    albuterol (VENTOLIN HFA) 90 mcg/actuation inhaler, Inhale 2 puffs into the lungs every 4 (four) hours as needed for Wheezing or Shortness of Breath. Rescue, Disp: 18 g, Rfl: 11    benzonatate (TESSALON) 100 MG capsule, Take 1 capsule (100 mg total) by mouth 3 (three) times daily as needed for Cough., Disp: 90 capsule, Rfl: 1    pantoprazole (PROTONIX) 40 MG tablet, Take 1 tablet (40 mg total) by mouth once daily., Disp: 90 tablet, Rfl: 0    QUEtiapine (SEROQUEL) 50 MG tablet, Take 1 tablet (50 mg total) by mouth every evening., Disp: 30 tablet, Rfl: 11    tiZANidine (ZANAFLEX) 4 MG tablet, Take 1 tablet (4 mg total) by mouth every 6 (six) hours as needed (Neck pain)., Disp: 30 tablet, Rfl: 2    azithromycin (Z-SILVIO) 250 MG tablet, Take 1 tablet (250 mg total) by mouth once daily., Disp: 30 tablet, Rfl: 11     "budesonide-glycopyr-formoterol 160-9-4.8 mcg/actuation HFAA, Inhale 2 puffs into the lungs 2 (two) times daily., Disp: 10.7 g, Rfl: 11    meloxicam (MOBIC) 15 MG tablet, Take 1 tablet by mouth once daily, Disp: 30 tablet, Rfl: 0    montelukast (SINGULAIR) 10 mg tablet, Take 1 tablet (10 mg total) by mouth every evening., Disp: 90 tablet, Rfl: 1    traZODone (DESYREL) 100 MG tablet, Take 1 tablet by mouth in the evening, Disp: 90 tablet, Rfl: 0      Objective:   /72 (BP Location: Left arm, Patient Position: Sitting)   Pulse 72   Ht 5' 5" (1.651 m)   Wt 38.9 kg (85 lb 12.8 oz)   SpO2 96%   BMI 14.28 kg/m²       Physical Exam  Vitals and nursing note reviewed.   Constitutional:       Appearance: Normal appearance.   HENT:      Head: Normocephalic and atraumatic.      Right Ear: External ear normal.      Left Ear: External ear normal.   Eyes:      General: No scleral icterus.        Right eye: No discharge.         Left eye: No discharge.      Extraocular Movements: Extraocular movements intact.      Conjunctiva/sclera: Conjunctivae normal.      Pupils: Pupils are equal, round, and reactive to light.   Cardiovascular:      Rate and Rhythm: Normal rate and regular rhythm.      Pulses: Normal pulses.      Heart sounds: Normal heart sounds. No murmur heard.     No friction rub. No gallop.   Pulmonary:      Effort: Pulmonary effort is normal.      Breath sounds: Normal breath sounds. No wheezing, rhonchi or rales.   Chest:      Chest wall: No tenderness.   Abdominal:      General: Abdomen is flat. Bowel sounds are normal. There is no distension.      Palpations: Abdomen is soft.      Tenderness: There is no abdominal tenderness. There is no guarding or rebound.   Musculoskeletal:         General: No swelling or tenderness. Normal range of motion.      Cervical back: Normal range of motion and neck supple.   Skin:     General: Skin is warm and dry.      Findings: No erythema or rash.   Neurological:      General: " No focal deficit present.      Mental Status: She is alert and oriented to person, place, and time.      Cranial Nerves: No cranial nerve deficit.      Motor: No weakness.      Gait: Gait normal.   Psychiatric:         Mood and Affect: Mood normal.         Behavior: Behavior normal.         Thought Content: Thought content normal.         Judgment: Judgment normal.         Assessment:     1. Centrilobular emphysema        2. Abnormal EKG      Small area of artifact on nuke study, otherwise normal, continue to monitor with trial of metoprolol.      3. H/O hypotension      symptoms have resolved, will monitor with addition of beta blockade      4. Palpitations      consistent with short runs of SVT, will add Troprol Xl 25 mg po at hs.      5. Dizziness      has improved.            Plan:   Continue ASA 81 mg q d, add Toprol XL 25 mg at HS, monitor symptoms, follow up with Rajani Avelar NP, in two to three weeks, sooner if symptoms change.               [1]   Current Outpatient Medications:     albuterol (PROVENTIL) 2.5 mg /3 mL (0.083 %) nebulizer solution, Take 3 mLs (2.5 mg total) by nebulization every 6 (six) hours as needed for Wheezing., Disp: 90 mL, Rfl: 1    albuterol (VENTOLIN HFA) 90 mcg/actuation inhaler, Inhale 2 puffs into the lungs every 4 (four) hours as needed for Wheezing or Shortness of Breath. Rescue, Disp: 18 g, Rfl: 11    benzonatate (TESSALON) 100 MG capsule, Take 1 capsule (100 mg total) by mouth 3 (three) times daily as needed for Cough., Disp: 90 capsule, Rfl: 1    budesonide-glycopyr-formoterol 160-9-4.8 mcg/actuation HFAA, Inhale 2 puffs into the lungs 2 (two) times daily., Disp: 10.7 g, Rfl: 11    meloxicam (MOBIC) 15 MG tablet, Take 1 tablet by mouth once daily, Disp: 30 tablet, Rfl: 0    montelukast (SINGULAIR) 10 mg tablet, Take 1 tablet (10 mg total) by mouth every evening., Disp: 90 tablet, Rfl: 1    pantoprazole (PROTONIX) 40 MG tablet, Take 1 tablet (40 mg total) by mouth once  daily., Disp: 90 tablet, Rfl: 0    QUEtiapine (SEROQUEL) 50 MG tablet, Take 1 tablet (50 mg total) by mouth every evening., Disp: 30 tablet, Rfl: 11    tiZANidine (ZANAFLEX) 4 MG tablet, Take 1 tablet (4 mg total) by mouth every 6 (six) hours as needed (Neck pain)., Disp: 30 tablet, Rfl: 2    traZODone (DESYREL) 100 MG tablet, Take 1 tablet by mouth in the evening, Disp: 90 tablet, Rfl: 0    azithromycin (Z-SILVIO) 250 MG tablet, Take 1 tablet (250 mg total) by mouth once daily. (Patient not taking: Reported on 7/16/2025), Disp: 30 tablet, Rfl: 11    metoprolol succinate (TOPROL-XL) 25 MG 24 hr tablet, Take 1 tablet (25 mg total) by mouth every evening., Disp: 90 tablet, Rfl: 1

## 2025-07-17 ENCOUNTER — TELEPHONE (OUTPATIENT)
Dept: PHARMACY | Facility: CLINIC | Age: 65
End: 2025-07-17
Payer: COMMERCIAL

## 2025-07-17 RX ORDER — METOPROLOL SUCCINATE 25 MG/1
25 TABLET, EXTENDED RELEASE ORAL NIGHTLY
Qty: 90 TABLET | Refills: 1 | Status: SHIPPED | OUTPATIENT
Start: 2025-07-17

## 2025-07-17 NOTE — TELEPHONE ENCOUNTER
Copied from CRM #[id number]. Topic: Medications - Medication Status Check   >> Jul 17, 2025 11:58 AM Sonia wrote:  Who Called: Neyda Claire    Patient is checking to see when someone will be calling her Toprol XL 25mg in to her pharmacy at [address], [place] - [address] Message was sent yesterday and no one has address it yet and patient would like this done as soon as you can. Please call her at the number below. Thanks      Preferred Method of Contact: Phone Call  Patient's Preferred Phone Number on File: [phone]    Spoke with the patient on today , informed her that we were busy yesterday in clinic but  will get her medication signed on today. Patient verbalized understanding.

## 2025-07-17 NOTE — TELEPHONE ENCOUNTER
Copied from CRM #[id number]. Topic: Medications - Medication Question  >> Jul 16, 2025  4:11 PM Sonia wrote:  Who Called: Neyda Claire    Patient is checking to see when her Toprol 25mg will be called into [facility] pharmacy on [address]. Please call her back when you can.     Please leave a detailed VM if she does not answer.     Preferred Method of Contact: Phone Call  Patient's Preferred Phone Number on File: [phone]    Resolved

## 2025-07-23 DIAGNOSIS — M54.2 NECK PAIN: ICD-10-CM

## 2025-07-23 RX ORDER — MELOXICAM 15 MG/1
15 TABLET ORAL
Qty: 30 TABLET | Refills: 0 | OUTPATIENT
Start: 2025-07-23

## 2025-07-28 NOTE — ASSESSMENT & PLAN NOTE
Small area of artifact on nuke study, otherwise normal, continue to monitor with trial of metoprolol.

## 2025-08-04 ENCOUNTER — OFFICE VISIT (OUTPATIENT)
Dept: FAMILY MEDICINE | Facility: CLINIC | Age: 65
End: 2025-08-04
Payer: COMMERCIAL

## 2025-08-04 VITALS
OXYGEN SATURATION: 96 % | DIASTOLIC BLOOD PRESSURE: 69 MMHG | HEIGHT: 65 IN | SYSTOLIC BLOOD PRESSURE: 99 MMHG | WEIGHT: 85.19 LBS | RESPIRATION RATE: 18 BRPM | HEART RATE: 80 BPM | BODY MASS INDEX: 14.19 KG/M2 | TEMPERATURE: 99 F

## 2025-08-04 DIAGNOSIS — K21.9 GERD WITHOUT ESOPHAGITIS: ICD-10-CM

## 2025-08-04 DIAGNOSIS — Z13.1 SCREENING FOR DIABETES MELLITUS: ICD-10-CM

## 2025-08-04 DIAGNOSIS — Z78.0 POSTMENOPAUSAL: ICD-10-CM

## 2025-08-04 DIAGNOSIS — F31.9 BIPOLAR DEPRESSION: ICD-10-CM

## 2025-08-04 DIAGNOSIS — Z13.220 SCREENING FOR LIPOID DISORDERS: ICD-10-CM

## 2025-08-04 DIAGNOSIS — Z00.00 WELL ADULT EXAM: Primary | ICD-10-CM

## 2025-08-04 DIAGNOSIS — M54.2 NECK PAIN: ICD-10-CM

## 2025-08-04 PROCEDURE — 3074F SYST BP LT 130 MM HG: CPT | Mod: ,,, | Performed by: NURSE PRACTITIONER

## 2025-08-04 PROCEDURE — 1160F RVW MEDS BY RX/DR IN RCRD: CPT | Mod: ,,, | Performed by: NURSE PRACTITIONER

## 2025-08-04 PROCEDURE — 3008F BODY MASS INDEX DOCD: CPT | Mod: ,,, | Performed by: NURSE PRACTITIONER

## 2025-08-04 PROCEDURE — 3078F DIAST BP <80 MM HG: CPT | Mod: ,,, | Performed by: NURSE PRACTITIONER

## 2025-08-04 PROCEDURE — 1159F MED LIST DOCD IN RCRD: CPT | Mod: ,,, | Performed by: NURSE PRACTITIONER

## 2025-08-04 PROCEDURE — 99396 PREV VISIT EST AGE 40-64: CPT | Mod: ,,, | Performed by: NURSE PRACTITIONER

## 2025-08-04 RX ORDER — MELOXICAM 15 MG/1
15 TABLET ORAL DAILY
Qty: 90 TABLET | Refills: 1 | Status: SHIPPED | OUTPATIENT
Start: 2025-08-04

## 2025-08-04 RX ORDER — TIZANIDINE 4 MG/1
4 TABLET ORAL EVERY 6 HOURS PRN
Qty: 30 TABLET | Refills: 2 | Status: SHIPPED | OUTPATIENT
Start: 2025-08-04

## 2025-08-04 RX ORDER — QUETIAPINE FUMARATE 100 MG/1
100 TABLET, FILM COATED ORAL NIGHTLY
Qty: 30 TABLET | Refills: 11 | Status: SHIPPED | OUTPATIENT
Start: 2025-08-04 | End: 2026-08-04

## 2025-08-04 RX ORDER — PANTOPRAZOLE SODIUM 40 MG/1
40 TABLET, DELAYED RELEASE ORAL DAILY
Qty: 90 TABLET | Refills: 1 | Status: SHIPPED | OUTPATIENT
Start: 2025-08-04

## 2025-08-04 NOTE — PROGRESS NOTES
[facility]    Chief Complaint      Chief Complaint   Patient presents with    Healthy You     She is not fasting    Depression     She wants to talk about changing meds. She says she's not sleeping well.     Nasal Congestion    Sinusitis    Health Maintenance     She says she agrees to DEXA scan       History of Present Illness      Neyda Claire is a 64 y.o. female. She  has a past medical history of Bipolar disorder, unspecified, Cancer, COPD (chronic obstructive pulmonary disease), and Mental disorder., who presents today for Healthy You visit- will return tomorrow for lab work.  Wants to discuss her Seroquel- still feels depressed at times (but has mostly complaints about family dynamics) and some trouble staying asleep. She was recently seen by Cardiology and he started on Metoprolol.     Past Medical History:  Past Medical History:   Diagnosis Date    Bipolar disorder, unspecified     Cancer     basal cell carcinoma top of head, cervical ca at age 16    COPD (chronic obstructive pulmonary disease)     Mental disorder        Past Surgical History:   has a past surgical history that includes Hysterectomy; Tubal ligation; Laparoscopy (12/13/2023); repair, hernia, inguinal (12/13/2023); excision, small intestine (N/A, 12/13/2023); Appendectomy (12/13/2023); Cholangiogram (N/A, 12/13/2023); Cholecystectomy (12/13/2023); Lysis of adhesions (12/13/2023); Oophorectomy; Hernia repair (12/13/23); Tonsillectomy (1970); Adenoidectomy (1970); Colon surgery (12/13/23); and Eye surgery (May 2024).    Social History:  Social History[1]    I personally reviewed all past medical, surgical, and social.     Review of Systems   Constitutional:  Negative for fatigue and unexpected weight change.   HENT:  Positive for congestion. Negative for ear pain.    Eyes:  Negative for visual disturbance.   Respiratory:  Positive for cough. Negative for shortness of breath.    Cardiovascular: Negative.    Gastrointestinal:  Negative  "for abdominal pain, constipation and diarrhea.   Musculoskeletal:  Negative for gait problem.   Skin: Negative.    Neurological:  Negative for dizziness, light-headedness and headaches.   Psychiatric/Behavioral:  Positive for sleep disturbance.         Medications:  Encounter Medications[2]    Allergies:  Review of patient's allergies indicates:   Allergen Reactions    Cyclobenzaprine Anxiety, Itching, Palpitations and Shortness Of Breath    Sulfa (sulfonamide antibiotics)        Health Maintenance:  Immunization History   Administered Date(s) Administered    COVID-19, MRNA, LN-S, PF (MODERNA FULL 0.5 ML DOSE) 03/12/2021, 04/09/2021    Pneumococcal Conjugate - 20 Valent 11/18/2024    Tdap 06/17/2019      Health Maintenance   Topic Date Due    DEXA Scan  Never done    Shingles Vaccine (1 of 2) Never done    RSV Vaccine (Age 60+ and Pregnant patients) (1 - Risk 60-74 years 1-dose series) Never done    COVID-19 Vaccine (3 - 2024-25 season) 09/01/2024    Influenza Vaccine (1) 09/01/2025    LDCT Lung Screen  01/23/2026    Mammogram  06/12/2026    Colorectal Cancer Screening  11/27/2027    Lipid Panel  02/22/2028    TETANUS VACCINE  06/17/2029    Hepatitis C Screening  Completed    HIV Screening  Completed    Pneumococcal Vaccines (Age 50+)  Completed        Physical Exam      Vital Signs  Temp: 98.5 °F (36.9 °C)  Temp Source: Oral  Pulse: 80  Resp: 18  SpO2: 96 %  BP: 99/69  BP Location: Left arm  Patient Position: Sitting  Pain Score: 0-No pain  Height and Weight  Height: 5' 5" (165.1 cm)  Weight: 38.6 kg (85 lb 3.2 oz)  BSA (Calculated - sq m): 1.33 sq meters  BMI (Calculated): 14.2  Weight in (lb) to have BMI = 25: 149.9]    Physical Exam  Vitals and nursing note reviewed.   Constitutional:       Appearance: Normal appearance. She is well-developed and underweight.   HENT:      Head: Normocephalic.      Right Ear: Hearing normal.      Left Ear: Hearing normal.      Nose: Nose normal.   Eyes:      General: Lids are " normal.      Extraocular Movements: Extraocular movements intact.      Conjunctiva/sclera: Conjunctivae normal.      Pupils: Pupils are equal, round, and reactive to light.   Cardiovascular:      Rate and Rhythm: Normal rate and regular rhythm.      Heart sounds: Normal heart sounds.   Pulmonary:      Effort: Pulmonary effort is normal.      Breath sounds: Normal breath sounds.   Musculoskeletal:         General: Normal range of motion.      Cervical back: Normal range of motion and neck supple.      Right lower leg: No edema.      Left lower leg: No edema.   Skin:     General: Skin is warm and dry.   Neurological:      Mental Status: She is alert and oriented to person, place, and time.      Gait: Gait is intact.   Psychiatric:         Behavior: Behavior is cooperative.          Laboratory:  CBC:  Recent Labs   Lab 12/20/23  0413 12/05/24  1652 05/29/25  1435   WBC 10.95 12.11 H 14.27 H   RBC 4.30 3.97 L 4.56   Hemoglobin 13.3 12.1 14.4   Hematocrit 38.6 39.0 45.6   Platelet Count 294 408 H 407 H   MCV 89.8 98.2 H 100.0 H   MCH 30.9 30.5 31.6 H   MCHC 34.5 31.0 L 31.6 L     CMP:  Recent Labs   Lab 12/12/23  1330 12/14/23  0315 12/05/24  1652 05/29/25  1435   Glucose 102   < > 175 H 90   Calcium 8.9   < > 8.7 9.5   Albumin 3.7  --  3.7 4.4   Total Protein 7.3  --  6.6 7.5   Sodium 135 L   < > 139 145   Potassium 3.5   < > 3.7 3.1 L   CO2 23   < > 26 22 L   Carbon Dioxide  --    < >  --   --    Chloride 103   < > 106 105   BUN 26 H   < > 16 6 L   Blood Urea Nitrogen  --    < >  --   --    Alk Phos 66  --  79 76   ALT 23  --  26 25   AST 21  --  35 H 46 H   Bilirubin, Total 0.6  --  0.3 0.3    < > = values in this interval not displayed.     LIPIDS:  Recent Labs   Lab 02/22/23  1730   TSH 0.551   HDL Cholesterol 81 H   Cholesterol 189   Triglycerides 63   LDL Calculated 95   Cholesterol/HDL Ratio (Risk Factor) 2.3   Non-     TSH:  Recent Labs   Lab 02/22/23  1730   TSH 0.551     A1C:  Recent Labs   Lab  23  1730 23  1626   Hemoglobin A1C 5.6 5.5       Assessment/Plan     Neyda Claire is a 64 y.o.female with:     1. Well adult exam    2. Neck pain  -     meloxicam (MOBIC) 15 MG tablet; Take 1 tablet (15 mg total) by mouth once daily.  Dispense: 90 tablet; Refill: 1  -     tiZANidine (ZANAFLEX) 4 MG tablet; Take 1 tablet (4 mg total) by mouth every 6 (six) hours as needed (Neck pain).  Dispense: 30 tablet; Refill: 2  -     Ambulatory referral/consult to Pain Clinic; Future; Expected date: 2025    3. GERD without esophagitis  -     pantoprazole (PROTONIX) 40 MG tablet; Take 1 tablet (40 mg total) by mouth once daily.  Dispense: 90 tablet; Refill: 1    4. Postmenopausal  -     DXA Bone Density Axial Skeleton 1 or more sites; Future; Expected date: 2025    5. Screening for lipoid disorders  -     Lipid Panel; Future; Expected date: 2025    6. Screening for diabetes mellitus  -     Glucose, Fasting; Future; Expected date: 2025    7. Bipolar depression  -     QUEtiapine (SEROQUEL) 100 MG Tab; Take 1 tablet (100 mg total) by mouth every evening.  Dispense: 30 tablet; Refill: 11         Total time spent face-to-face and non-face-to-face coordinating care for this encounter was: 20 minutes min    Chronic conditions status updated as per HPI.  Other than changes above, cont current medications and maintain follow up with specialists.  Return to clinic prn if symptoms worsen or fail to improve.    Samara Solano, FNP  [facility]         [1]   Social History  Tobacco Use    Smoking status: Former     Current packs/day: 0.00     Average packs/day: 1 pack/day for 44.6 years (44.6 ttl pk-yrs)     Types: Cigarettes     Start date:      Quit date: 2019     Years since quittin.9     Passive exposure: Past    Smokeless tobacco: Never    Tobacco comments:     Wished id have never put one in my mouth. Both parents smoked. Hardest thing for me to quit. Even al   Substance Use  Topics    Alcohol use: Not Currently     Comment: Used to drink heavily. None since 2015    Drug use: Not Currently     Types: Benzodiazepines, Codeine, Hydrocodone, Marijuana, Morphine, Oxycodone     Comment: Most of these were while recuperating from surgeries   [2]   Outpatient Encounter Medications as of 8/4/2025   Medication Sig Note Dispense Refill    albuterol (PROVENTIL) 2.5 mg /3 mL (0.083 %) nebulizer solution Take 3 mLs (2.5 mg total) by nebulization every 6 (six) hours as needed for Wheezing.  90 mL 1    albuterol (VENTOLIN HFA) 90 mcg/actuation inhaler Inhale 2 puffs into the lungs every 4 (four) hours as needed for Wheezing or Shortness of Breath. Rescue  18 g 11    benzonatate (TESSALON) 100 MG capsule Take 1 capsule (100 mg total) by mouth 3 (three) times daily as needed for Cough.  90 capsule 1    budesonide-glycopyr-formoterol 160-9-4.8 mcg/actuation HFAA Inhale 2 puffs into the lungs 2 (two) times daily. 6/10/2025: PAP referred    10.7 g 11    metoprolol succinate (TOPROL-XL) 25 MG 24 hr tablet Take 1 tablet (25 mg total) by mouth every evening.  90 tablet 1    montelukast (SINGULAIR) 10 mg tablet Take 1 tablet (10 mg total) by mouth every evening.  90 tablet 1    traZODone (DESYREL) 100 MG tablet Take 1 tablet by mouth in the evening  90 tablet 0    [DISCONTINUED] meloxicam (MOBIC) 15 MG tablet Take 1 tablet by mouth once daily  30 tablet 0    [DISCONTINUED] pantoprazole (PROTONIX) 40 MG tablet Take 1 tablet (40 mg total) by mouth once daily.  90 tablet 0    [DISCONTINUED] QUEtiapine (SEROQUEL) 50 MG tablet Take 1 tablet (50 mg total) by mouth every evening.  30 tablet 11    [DISCONTINUED] tiZANidine (ZANAFLEX) 4 MG tablet Take 1 tablet (4 mg total) by mouth every 6 (six) hours as needed (Neck pain).  30 tablet 2    azithromycin (Z-SILVIO) 250 MG tablet Take 1 tablet (250 mg total) by mouth once daily.  30 tablet 11    meloxicam (MOBIC) 15 MG tablet Take 1 tablet (15 mg total) by mouth once daily.   90 tablet 1    pantoprazole (PROTONIX) 40 MG tablet Take 1 tablet (40 mg total) by mouth once daily.  90 tablet 1    QUEtiapine (SEROQUEL) 100 MG Tab Take 1 tablet (100 mg total) by mouth every evening.  30 tablet 11    tiZANidine (ZANAFLEX) 4 MG tablet Take 1 tablet (4 mg total) by mouth every 6 (six) hours as needed (Neck pain).  30 tablet 2     No facility-administered encounter medications on file as of 8/4/2025.

## 2025-08-06 ENCOUNTER — PATIENT MESSAGE (OUTPATIENT)
Dept: FAMILY MEDICINE | Facility: CLINIC | Age: 65
End: 2025-08-06
Payer: COMMERCIAL

## 2025-08-06 ENCOUNTER — PATIENT OUTREACH (OUTPATIENT)
Facility: HOSPITAL | Age: 65
End: 2025-08-06
Payer: COMMERCIAL

## 2025-08-06 NOTE — PROGRESS NOTES
Population Health Chart Review & Patient Outreach Details      Further Action Needed If Patient Returns Outreach:        Health Maintenance Due   Topic Date Due    DEXA Scan  Never done    Shingles Vaccine (1 of 2) Never done    RSV Vaccine (Age 60+ and Pregnant patients) (1 - Risk 60-74 years 1-dose series) Never done    COVID-19 Vaccine (3 - 2024-25 season) 2024          Updates Requested / Reviewed:     []  Care Everywhere    []     []  External Sources ([facility], [facility], [facility], etc.)    [] [facility]   [] [facility]   [] Other:    []  Care Team Updated   []  Removed  or Duplicate Orders   []  Immunization Reconciliation Completed / Queried    [] [place]   [] [place]   [] [place]   [] [place]      Health Maintenance Topics Addressed and Outreach Outcomes / Actions Taken:             Breast Cancer Screening []  Mammogram Order Placed    []  Mammogram Screening Scheduled    []  External Records Requested & Care Team Updated if Applicable    []  External Records Uploaded & Care Team Updated if Applicable    []  Pt Declined Scheduling Mammogram    []  Pt Will Schedule with External Provider / Order Routed & Care Team Updated if Applicable              Cervical Cancer Screening []  Pap Smear Scheduled in Primary Care or OBGYN    []  External Records Requested & Care Team Updated if Applicable       []  External Records Uploaded, Care Team Updated, & History Updated if Applicable    []  Patient Declined Scheduling Pap Smear    []  Patient Will Schedule with External Provider & Care Team Updated if Applicable                  Colorectal Cancer Screening []  Colonoscopy Case Request / Referral / Home Test Order Placed    []  External Records Requested & Care Team Updated if Applicable    []  External Records Uploaded, Care Team Updated, & History Updated if Applicable    []  Patient Declined Completing Colon Cancer Screening    []  Patient Will Schedule with External Provider & Care Team Updated if  Applicable    []  Fit Kit Mailed (add the SmartPhrase under additional notes)    []  Reminded Patient to Complete Home Test                Diabetic Eye Exam []  Eye Exam Screening Order Placed    []  Eye Camera Scheduled or Optometry/Ophthalmology Referral Placed    []  External Records Requested & Care Team Updated if Applicable    []  External Records Uploaded, Care Team Updated, & History Updated if Applicable    []  Patient Declined Scheduling Eye Exam    []  Patient Will Schedule with External Provider & Care Team Updated if Applicable             Blood Pressure Control []  Primary Care Follow Up Visit Scheduled     []  Remote Blood Pressure Reading Captured    []  Patient Declined Remote Reading or Scheduling Appt - Escalated to PCP    []  Patient Will Call Back or Send Portal Message with Reading                 HbA1c & Other Labs []  Overdue Lab(s) Ordered    []  Overdue Lab(s) Scheduled    []  External Records Uploaded & Care Team Updated if Applicable    []  Primary Care Follow Up Visit Scheduled     []  Reminded Patient to Complete A1c Home Test    []  Patient Declined Scheduling Labs or Will Call Back to Schedule    []  Patient Will Schedule with External Provider / Order Routed, & Care Team Updated if Applicable           Primary Care Appointment []  Primary Care Appt Scheduled    []  Patient Declined Scheduling or Will Call Back to Schedule    []  Pt Established with External Provider, Updated Care Team, & Informed Pt to Notify Payor if Applicable           Medication Adherence /    Statin Use []  Primary Care Appointment Scheduled    []  Patient Reminded to  Prescription    []  Patient Declined, Provider Notified if Needed    []  Sent Provider Message to Review to Evaluate Pt for Statin, Add Exclusion Dx Codes, Document   Exclusion in Problem List, Change Statin Intensity Level to Moderate or High Intensity if Applicable                Osteoporosis Screening []  Dexa Order Placed    []  Dexa  Appointment Scheduled    []  External Records Requested & Care Team Updated    []  External Records Uploaded, Care Team Updated, & History Updated if Applicable    []  Patient Declined Scheduling Dexa or Will Call Back to Schedule    []  Patient Will Schedule with External Provider / Order Routed & Care Team Updated if Applicable       Additional Notes:       Post visit Population Health review of encounter with date of service  8/4/25/ with Xiomara. Not all required HY components in encounter. Labs in as future message sent.   Followup appt for: 7/23/26 HY  HY 8/4/25 needs future labs due on 8/18/25  Received: Today  Apoorva Starks LPN  P Xiomara [facility] Staff  Patient failed to have labs drawn at 8/4/25 HY visit.  Please contact patient to return prior to end of day 8/18/25 to avoid claim denial.  Apoorva Chou

## 2025-08-07 ENCOUNTER — LAB VISIT (OUTPATIENT)
Dept: LAB | Facility: CLINIC | Age: 65
End: 2025-08-07
Payer: COMMERCIAL

## 2025-08-07 ENCOUNTER — OFFICE VISIT (OUTPATIENT)
Dept: CARDIOLOGY | Facility: CLINIC | Age: 65
End: 2025-08-07
Payer: COMMERCIAL

## 2025-08-07 VITALS
BODY MASS INDEX: 14.49 KG/M2 | HEART RATE: 88 BPM | RESPIRATION RATE: 18 BRPM | HEIGHT: 65 IN | DIASTOLIC BLOOD PRESSURE: 80 MMHG | WEIGHT: 87 LBS | SYSTOLIC BLOOD PRESSURE: 110 MMHG

## 2025-08-07 DIAGNOSIS — Z13.1 SCREENING FOR DIABETES MELLITUS: ICD-10-CM

## 2025-08-07 DIAGNOSIS — I47.10 SVT (SUPRAVENTRICULAR TACHYCARDIA): Primary | ICD-10-CM

## 2025-08-07 DIAGNOSIS — J43.2 CENTRILOBULAR EMPHYSEMA: ICD-10-CM

## 2025-08-07 DIAGNOSIS — Z13.220 SCREENING FOR LIPOID DISORDERS: ICD-10-CM

## 2025-08-07 PROCEDURE — 99214 OFFICE O/P EST MOD 30 MIN: CPT | Mod: PBBFAC | Performed by: NURSE PRACTITIONER

## 2025-08-07 PROCEDURE — 99999 PR PBB SHADOW E&M-EST. PATIENT-LVL IV: CPT | Mod: PBBFAC,,, | Performed by: NURSE PRACTITIONER

## 2025-08-07 NOTE — ASSESSMENT & PLAN NOTE
Symptoms have improved  Continue Toprol XL 25mg daily  Pt states she does like water and does not drink it much  She mostly drinks tea  Discussed how dehydration can cause SVT  Increase water intake and decrease tea consumption

## 2025-08-07 NOTE — PROGRESS NOTES
PCP: Samara Solano FNP    Referring Provider:     Subjective:   Neyda Claire is a 64 y.o. female with hx of bipolar disorder, COPD who presents for follow up.     Pt was seen by Dr. Jeter 25 and was started on Toprol XL 25mg daily for c/o palpitations. ZIO monitored showed 20 short runs of SVT. EF 55-60% per Echo 2025. NM stress test 25 showed a fixed perfusion defect in the inferior septal wall suggestive of infarct vs. Artifact. Clinical correlation was advised.     Today, pt states her symptoms have improved and her palpitations are much less frequent. She has c/o chest pain that is worse with coughing and deep breathing. Denies chest pain with exertion. She has SOB on exertion. She follows with pulmonary for her COPD.     Family History   Problem Relation Name Age of Onset    Breast cancer Mother Magdalena Patel (breast)     Stroke Mother Magdalena Patel (breast)     Hypertension Mother Magdalena Patel (breast)     Cancer Mother Magdalena Patel (breast)     No Known Problems Father      Tuberculosis Paternal Grandfather      Liver cancer Brother Rickie [place] ()     Alcohol abuse Brother Rickie [place] ()     Asthma Brother Rickie [place] ()     Cancer Brother Rickie [place] ()     COPD Brother Rickie [place] ()     Depression Brother Rickie [place] ()     Early death Brother Rickieramya Jernigan ()     Alcohol abuse Maternal Grandfather Guanako Quinones ( )     Diabetes Maternal Grandfather Guanako Quinones ( )     Asthma Daughter Danika Gomez     Miscarriages / Stillbirths Daughter Danika Gomez       Social History[1]     EKG   Results for orders placed or performed in visit on 25   EKG 12-lead    Collection Time: 25  3:15 PM   Result Value Ref Range    QRS Duration 76 ms    [facility]HS QTC Calculation 433 ms    Narrative    Test Reason : R42,    Vent. Rate :  78 BPM     Atrial Rate :  78 BPM     P-R Int : 120 ms          QRS Dur :  76 ms      QT Int :  380 ms       P-R-T Axes :  75 -32  70 degrees    QTcB Int : 433 ms    Normal sinus rhythm  Left axis deviation  Anteroseptal infarct (cited on or before 05-Dec-2024)  Abnormal ECG  When compared with ECG of 05-Dec-2024 16:36,  The axis Shifted left  Confirmed by Luis Enrique Jeter ([id number]) on 6/10/2025 12:26:12 PM    Referred By: YUDITH PATEL           Confirmed By: Luis Enrique Jeter     ECHO Results for orders placed during the hospital encounter of 06/20/25    Echo    Interpretation Summary    Left Ventricle: The left ventricle is normal in size. Normal wall thickness. There is normal systolic function with a visually estimated ejection fraction of 55 - 60%. Grade I diastolic dysfunction.    Right Ventricle: The right ventricle is normal in size measuring 2.6 cm. Systolic function is normal.    Tricuspid Valve: There is mild regurgitation.    [facility] No results found for this or any previous visit.        Lab Results   Component Value Date     05/29/2025    K 3.1 (L) 05/29/2025     05/29/2025    CO2 22 (L) 05/29/2025    BUN 6 (L) 05/29/2025    CREATININE 0.65 05/29/2025    CALCIUM 9.5 05/29/2025    ANIONGAP 21 (H) 05/29/2025    ESTGFRAFRICA 106 06/27/2024       Lab Results   Component Value Date    CHOL 189 02/22/2023    CHOL 174 03/29/2022     Lab Results   Component Value Date    HDL 81 (H) 02/22/2023    HDL 71 (H) 03/29/2022     Lab Results   Component Value Date    LDLCALC 95 02/22/2023    LDLCALC 91 03/29/2022     Lab Results   Component Value Date    TRIG 63 02/22/2023    TRIG 60 03/29/2022     Lab Results   Component Value Date    CHOLHDL 2.3 02/22/2023    CHOLHDL 2.5 03/29/2022       Lab Results   Component Value Date    WBC 14.27 (H) 05/29/2025    HGB 14.4 05/29/2025    HCT 45.6 05/29/2025    .0 (H) 05/29/2025     (H) 05/29/2025         Current Medications[2]    Review of Systems   Constitutional:  Negative for chills, diaphoresis, fever and malaise/fatigue.   Respiratory:  Positive for cough  "and shortness of breath.    Cardiovascular:  Positive for chest pain and palpitations. Negative for orthopnea, leg swelling and PND.   Gastrointestinal:  Negative for abdominal pain, nausea and vomiting.   Musculoskeletal:  Negative for falls.   Neurological:  Negative for focal weakness and weakness.         Objective:   /80   Pulse 88   Resp 18   Ht 5' 5" (1.651 m)   Wt 39.5 kg (87 lb)   BMI 14.48 kg/m²     Physical Exam  Constitutional:       General: She is not in acute distress.     Appearance: Normal appearance.   Cardiovascular:      Rate and Rhythm: Normal rate and regular rhythm.      Pulses: Normal pulses.      Heart sounds: Normal heart sounds.   Pulmonary:      Effort: Pulmonary effort is normal.      Breath sounds: Normal breath sounds.   Musculoskeletal:      Cervical back: Neck supple. No rigidity.      Right lower leg: No edema.      Left lower leg: No edema.   Skin:     General: Skin is warm and dry.   Neurological:      Mental Status: She is alert.           Assessment:     1. SVT (supraventricular tachycardia)        2. Centrilobular emphysema              Plan:   SVT (supraventricular tachycardia)  Symptoms have improved  Continue Toprol XL 25mg daily  Pt states she does like water and does not drink it much  She mostly drinks tea  Discussed how dehydration can cause SVT  Increase water intake and decrease tea consumption    Centrilobular emphysema  Continue to follow with pulmonary      Follow up with Dr. Jeter in 6 months       [1]   Social History  Socioeconomic History    Marital status: Single   Tobacco Use    Smoking status: Former     Current packs/day: 0.00     Average packs/day: 1 pack/day for 44.6 years (44.6 ttl pk-yrs)     Types: Cigarettes     Start date:      Quit date: 2019     Years since quittin.0     Passive exposure: Past    Smokeless tobacco: Never    Tobacco comments:     Wished id have never put one in my mouth. Both parents smoked. Hardest thing for " me to quit. Even al   Substance and Sexual Activity    Alcohol use: Not Currently     Comment: Used to drink heavily. None since 2015    Drug use: Not Currently     Types: Benzodiazepines, Codeine, Hydrocodone, Marijuana, Morphine, Oxycodone     Comment: Most of these were while recuperating from surgeries    Sexual activity: Not Currently     Partners: Male     Birth control/protection: See Surgical Hx, Other-see comments     Comment: Hysterectomy     Social Drivers of Health     Financial Resource Strain: Low Risk  (2/13/2025)    Overall Financial Resource Strain (CARDIA)     Difficulty of Paying Living Expenses: Not hard at all   Food Insecurity: No Food Insecurity (2/13/2025)    Hunger Vital Sign     Worried About Running Out of Food in the Last Year: Never true     Ran Out of Food in the Last Year: Never true   Transportation Needs: No Transportation Needs (2/13/2025)    PRAPARE - Transportation     Lack of Transportation (Medical): No     Lack of Transportation (Non-Medical): No   Physical Activity: Inactive (2/13/2025)    Exercise Vital Sign     Days of Exercise per Week: 0 days     Minutes of Exercise per Session: 0 min   Stress: No Stress Concern Present (2/13/2025)    [employer] - Occupational Stress Questionnaire     Feeling of Stress : Only a little   Housing Stability: Low Risk  (2/13/2025)    Housing Stability Vital Sign     Unable to Pay for Housing in the Last Year: No     Number of Times Moved in the Last Year: 0     Homeless in the Last Year: No   [2]   Current Outpatient Medications:     albuterol (PROVENTIL) 2.5 mg /3 mL (0.083 %) nebulizer solution, Take 3 mLs (2.5 mg total) by nebulization every 6 (six) hours as needed for Wheezing., Disp: 90 mL, Rfl: 1    albuterol (VENTOLIN HFA) 90 mcg/actuation inhaler, Inhale 2 puffs into the lungs every 4 (four) hours as needed for Wheezing or Shortness of Breath. Rescue, Disp: 18 g, Rfl: 11    benzonatate (TESSALON) 100 MG  capsule, Take 1 capsule (100 mg total) by mouth 3 (three) times daily as needed for Cough., Disp: 90 capsule, Rfl: 1    budesonide-glycopyr-formoterol 160-9-4.8 mcg/actuation HFAA, Inhale 2 puffs into the lungs 2 (two) times daily., Disp: 10.7 g, Rfl: 11    meloxicam (MOBIC) 15 MG tablet, Take 1 tablet (15 mg total) by mouth once daily., Disp: 90 tablet, Rfl: 1    metoprolol succinate (TOPROL-XL) 25 MG 24 hr tablet, Take 1 tablet (25 mg total) by mouth every evening., Disp: 90 tablet, Rfl: 1    montelukast (SINGULAIR) 10 mg tablet, Take 1 tablet (10 mg total) by mouth every evening., Disp: 90 tablet, Rfl: 1    pantoprazole (PROTONIX) 40 MG tablet, Take 1 tablet (40 mg total) by mouth once daily., Disp: 90 tablet, Rfl: 1    QUEtiapine (SEROQUEL) 100 MG Tab, Take 1 tablet (100 mg total) by mouth every evening., Disp: 30 tablet, Rfl: 11    tiZANidine (ZANAFLEX) 4 MG tablet, Take 1 tablet (4 mg total) by mouth every 6 (six) hours as needed (Neck pain)., Disp: 30 tablet, Rfl: 2    traZODone (DESYREL) 100 MG tablet, Take 1 tablet by mouth in the evening, Disp: 90 tablet, Rfl: 0

## 2025-08-14 LAB
CHOLEST SERPL-MCNC: 142 MG/DL
CHOLEST/HDLC SERPL: 2.8 {RATIO}
GLUCOSE SERPL-MCNC: 84 MG/DL (ref 70–100)
HDLC SERPL-MCNC: 50 MG/DL (ref 35–60)
LDLC SERPL CALC-MCNC: 79 MG/DL
LDLC/HDLC SERPL: 1.6 {RATIO}
NONHDLC SERPL-MCNC: 92 MG/DL
TRIGL SERPL-MCNC: 65 MG/DL (ref 37–140)
VLDLC SERPL-MCNC: 13 MG/DL

## 2025-08-14 PROCEDURE — 82947 ASSAY GLUCOSE BLOOD QUANT: CPT | Mod: ,,, | Performed by: CLINICAL MEDICAL LABORATORY

## 2025-08-14 PROCEDURE — 80061 LIPID PANEL: CPT | Mod: ,,, | Performed by: CLINICAL MEDICAL LABORATORY

## 2025-09-03 ENCOUNTER — OFFICE VISIT (OUTPATIENT)
Dept: PAIN MEDICINE | Facility: CLINIC | Age: 65
End: 2025-09-03
Payer: COMMERCIAL

## 2025-09-03 ENCOUNTER — HOSPITAL ENCOUNTER (OUTPATIENT)
Dept: RADIOLOGY | Facility: HOSPITAL | Age: 65
Discharge: HOME | End: 2025-09-03
Attending: PAIN MEDICINE
Payer: COMMERCIAL

## 2025-09-03 VITALS
DIASTOLIC BLOOD PRESSURE: 59 MMHG | SYSTOLIC BLOOD PRESSURE: 104 MMHG | WEIGHT: 84 LBS | HEIGHT: 65 IN | BODY MASS INDEX: 13.99 KG/M2 | RESPIRATION RATE: 18 BRPM | HEART RATE: 75 BPM

## 2025-09-03 DIAGNOSIS — M54.16 LUMBAR RADICULOPATHY, CHRONIC: ICD-10-CM

## 2025-09-03 DIAGNOSIS — M54.9 DORSALGIA, UNSPECIFIED: ICD-10-CM

## 2025-09-03 DIAGNOSIS — Z79.899 ENCOUNTER FOR LONG-TERM (CURRENT) USE OF HIGH-RISK MEDICATION: Primary | ICD-10-CM

## 2025-09-03 DIAGNOSIS — M41.9 SCOLIOSIS, UNSPECIFIED SCOLIOSIS TYPE, UNSPECIFIED SPINAL REGION: ICD-10-CM

## 2025-09-03 DIAGNOSIS — G57.02 PIRIFORMIS SYNDROME OF LEFT SIDE: ICD-10-CM

## 2025-09-03 LAB

## 2025-09-03 PROCEDURE — 80305 DRUG TEST PRSMV DIR OPT OBS: CPT | Mod: PBBFAC | Performed by: PAIN MEDICINE

## 2025-09-03 PROCEDURE — 99999PBSHW POCT URINE DRUG SCREEN PRESUMP: Mod: PBBFAC,,,

## 2025-09-03 PROCEDURE — 3074F SYST BP LT 130 MM HG: CPT | Mod: ,,, | Performed by: PAIN MEDICINE

## 2025-09-03 PROCEDURE — 99999 PR PBB SHADOW E&M-EST. PATIENT-LVL V: CPT | Mod: PBBFAC,,, | Performed by: PAIN MEDICINE

## 2025-09-03 PROCEDURE — 3078F DIAST BP <80 MM HG: CPT | Mod: ,,, | Performed by: PAIN MEDICINE

## 2025-09-03 PROCEDURE — 72110 X-RAY EXAM L-2 SPINE 4/>VWS: CPT | Mod: TC

## 2025-09-03 PROCEDURE — 72110 X-RAY EXAM L-2 SPINE 4/>VWS: CPT | Mod: 26,,, | Performed by: RADIOLOGY

## 2025-09-03 PROCEDURE — 3008F BODY MASS INDEX DOCD: CPT | Mod: ,,, | Performed by: PAIN MEDICINE

## 2025-09-03 PROCEDURE — 99215 OFFICE O/P EST HI 40 MIN: CPT | Mod: PBBFAC,25 | Performed by: PAIN MEDICINE

## 2025-09-03 PROCEDURE — 99204 OFFICE O/P NEW MOD 45 MIN: CPT | Mod: S$PBB,,, | Performed by: PAIN MEDICINE

## 2025-09-03 PROCEDURE — 1159F MED LIST DOCD IN RCRD: CPT | Mod: ,,, | Performed by: PAIN MEDICINE

## 2025-09-03 RX ORDER — GABAPENTIN 300 MG/1
300 CAPSULE ORAL 3 TIMES DAILY
Qty: 90 CAPSULE | Refills: 0 | Status: SHIPPED | OUTPATIENT
Start: 2025-09-03 | End: 2025-10-03

## 2025-09-05 ENCOUNTER — PATIENT MESSAGE (OUTPATIENT)
Dept: FAMILY MEDICINE | Facility: CLINIC | Age: 65
End: 2025-09-05
Payer: COMMERCIAL

## (undated) DEVICE — EVACUATOR KIT SMOKE PLUME AWAY

## (undated) DEVICE — TROCAR ENDO Z THREAD KII 5X100

## (undated) DEVICE — SET INTRO CHOLANGIO 4FR 60CM

## (undated) DEVICE — PENCIL ELECTROSURG HOLST W/BLD

## (undated) DEVICE — CLIP HEMO-LOK ML

## (undated) DEVICE — TRAY CATH FOL SIL URIMTR 16FR

## (undated) DEVICE — GOWN NONREINF SET-IN SLV 2XL

## (undated) DEVICE — ELECTRODE BLADE INSULATED 1 IN

## (undated) DEVICE — GLOVE PROTEXIS PI SYN SURG 7

## (undated) DEVICE — GLOVE PROTEXIS PI SYN SURG 8.0

## (undated) DEVICE — ADHESIVE MASTISOL VIAL 48/BX

## (undated) DEVICE — COVER TIP CURVED SCISSORS XI

## (undated) DEVICE — BLADE ELECTRO EDGE INSULATED

## (undated) DEVICE — SUT PDS PLUS 1 TP1 96IN

## (undated) DEVICE — SUT CTD VICRYL 3-0 CR/SH

## (undated) DEVICE — KIT PROCEDURE STER INLET CLOSU

## (undated) DEVICE — SEAL UNIVERSAL 5MM-8MM XI

## (undated) DEVICE — GLOVE PROTEXIS PI SYN SURG 6.0

## (undated) DEVICE — STRIP MEDI WND CLSR 1/2X4IN

## (undated) DEVICE — SUT PDS II 1 CT VIL MONO 36

## (undated) DEVICE — GLOVE 8.5 PROTEXIS PI BLUE

## (undated) DEVICE — SUT ETHILON 2-0 FS 18IN BLK

## (undated) DEVICE — STAPLER SKIN ROTATING HEAD

## (undated) DEVICE — DRAIN PENRS SIL STRL .25X18IN

## (undated) DEVICE — OBTURATOR BLADELESS 8MM XI CLR

## (undated) DEVICE — RESERVOIR JACKSON-PRATT 100CC

## (undated) DEVICE — BAG TISS RETRV MONARCH 10MM

## (undated) DEVICE — RELOAD PROXIMATE CUT BLUE 75MM

## (undated) DEVICE — Device

## (undated) DEVICE — DRAPE INCISE IOBAN 2 23X23IN

## (undated) DEVICE — DRAIN FLAT HUBLESS FULL 10MM

## (undated) DEVICE — DRAPE ARM DAVINCI XI

## (undated) DEVICE — CANNULA SEAL 12MM

## (undated) DEVICE — NDL INSUFFLATION VERRES 120MM

## (undated) DEVICE — CANNULA REDUCER 12-8MM

## (undated) DEVICE — GLOVE PROTEXIS PI SYN SURG 7.5

## (undated) DEVICE — SYS IRRISEPT 450ML0.05% CHG

## (undated) DEVICE — APPLIER CLIP ENDO MED/LG 10MM

## (undated) DEVICE — SEALER LIGASURE IMPACT 18CM

## (undated) DEVICE — SUT SILK 2-0 SH 18IN BLACK

## (undated) DEVICE — CUTTER PROXIMATE BLUE 75MM

## (undated) DEVICE — WARMER BLUE HEAT SCOPE 3-12MM